# Patient Record
Sex: FEMALE | Race: WHITE | NOT HISPANIC OR LATINO | Employment: OTHER | URBAN - METROPOLITAN AREA
[De-identification: names, ages, dates, MRNs, and addresses within clinical notes are randomized per-mention and may not be internally consistent; named-entity substitution may affect disease eponyms.]

---

## 2017-01-11 ENCOUNTER — APPOINTMENT (OUTPATIENT)
Dept: LAB | Age: 82
End: 2017-01-11
Payer: MEDICARE

## 2017-01-11 ENCOUNTER — TRANSCRIBE ORDERS (OUTPATIENT)
Dept: ADMINISTRATIVE | Age: 82
End: 2017-01-11

## 2017-01-11 DIAGNOSIS — R73.9 BLOOD GLUCOSE ELEVATED: Primary | ICD-10-CM

## 2017-01-11 DIAGNOSIS — R73.9 BLOOD GLUCOSE ELEVATED: ICD-10-CM

## 2017-01-11 LAB
EST. AVERAGE GLUCOSE BLD GHB EST-MCNC: 108 MG/DL
GLUCOSE P FAST SERPL-MCNC: 106 MG/DL (ref 65–99)
HBA1C MFR BLD: 5.4 % (ref 4.2–6.3)

## 2017-01-11 PROCEDURE — 36415 COLL VENOUS BLD VENIPUNCTURE: CPT

## 2017-01-11 PROCEDURE — 83036 HEMOGLOBIN GLYCOSYLATED A1C: CPT

## 2017-01-11 PROCEDURE — 82947 ASSAY GLUCOSE BLOOD QUANT: CPT

## 2017-01-18 ENCOUNTER — ALLSCRIPTS OFFICE VISIT (OUTPATIENT)
Dept: OTHER | Facility: OTHER | Age: 82
End: 2017-01-18

## 2017-01-18 DIAGNOSIS — R73.9 HYPERGLYCEMIA: ICD-10-CM

## 2017-04-05 ENCOUNTER — ALLSCRIPTS OFFICE VISIT (OUTPATIENT)
Dept: OTHER | Facility: OTHER | Age: 82
End: 2017-04-05

## 2017-04-05 ENCOUNTER — HOSPITAL ENCOUNTER (OUTPATIENT)
Dept: RADIOLOGY | Age: 82
Discharge: HOME | End: 2017-04-05
Payer: MEDICARE

## 2017-04-05 ENCOUNTER — TRANSCRIBE ORDERS (OUTPATIENT)
Dept: ADMINISTRATIVE | Age: 82
End: 2017-04-05

## 2017-04-05 DIAGNOSIS — M79.671 PAIN OF RIGHT FOOT: ICD-10-CM

## 2017-04-05 PROCEDURE — 73630 X-RAY EXAM OF FOOT: CPT

## 2017-05-01 ENCOUNTER — ALLSCRIPTS OFFICE VISIT (OUTPATIENT)
Dept: OTHER | Facility: OTHER | Age: 82
End: 2017-05-01

## 2017-05-17 ENCOUNTER — TRANSCRIBE ORDERS (OUTPATIENT)
Dept: ADMINISTRATIVE | Age: 82
End: 2017-05-17

## 2017-05-17 ENCOUNTER — APPOINTMENT (OUTPATIENT)
Dept: LAB | Age: 82
End: 2017-05-17
Payer: MEDICARE

## 2017-05-17 DIAGNOSIS — R73.9 HYPERGLYCEMIA: ICD-10-CM

## 2017-05-17 LAB
EST. AVERAGE GLUCOSE BLD GHB EST-MCNC: 111 MG/DL
GLUCOSE P FAST SERPL-MCNC: 104 MG/DL (ref 65–99)
HBA1C MFR BLD: 5.5 % (ref 4.2–6.3)

## 2017-05-17 PROCEDURE — 36415 COLL VENOUS BLD VENIPUNCTURE: CPT

## 2017-05-17 PROCEDURE — 83036 HEMOGLOBIN GLYCOSYLATED A1C: CPT

## 2017-05-17 PROCEDURE — 82947 ASSAY GLUCOSE BLOOD QUANT: CPT

## 2017-05-23 ENCOUNTER — ALLSCRIPTS OFFICE VISIT (OUTPATIENT)
Dept: OTHER | Facility: OTHER | Age: 82
End: 2017-05-23

## 2017-05-23 DIAGNOSIS — I10 ESSENTIAL (PRIMARY) HYPERTENSION: ICD-10-CM

## 2017-05-23 DIAGNOSIS — R73.9 HYPERGLYCEMIA: ICD-10-CM

## 2017-09-22 ENCOUNTER — TRANSCRIBE ORDERS (OUTPATIENT)
Dept: ADMINISTRATIVE | Age: 82
End: 2017-09-22

## 2017-09-22 ENCOUNTER — APPOINTMENT (OUTPATIENT)
Dept: LAB | Age: 82
End: 2017-09-22
Payer: MEDICARE

## 2017-09-22 DIAGNOSIS — I10 ESSENTIAL (PRIMARY) HYPERTENSION: ICD-10-CM

## 2017-09-22 DIAGNOSIS — R73.9 HYPERGLYCEMIA: ICD-10-CM

## 2017-09-22 LAB
ALBUMIN SERPL BCP-MCNC: 3.5 G/DL (ref 3.5–5)
ALP SERPL-CCNC: 61 U/L (ref 46–116)
ALT SERPL W P-5'-P-CCNC: 9 U/L (ref 12–78)
ANION GAP SERPL CALCULATED.3IONS-SCNC: 7 MMOL/L (ref 4–13)
AST SERPL W P-5'-P-CCNC: 13 U/L (ref 5–45)
BACTERIA UR QL AUTO: ABNORMAL /HPF
BASOPHILS # BLD AUTO: 0.01 THOUSANDS/ΜL (ref 0–0.1)
BASOPHILS NFR BLD AUTO: 0 % (ref 0–1)
BILIRUB SERPL-MCNC: 0.88 MG/DL (ref 0.2–1)
BILIRUB UR QL STRIP: NEGATIVE
BUN SERPL-MCNC: 15 MG/DL (ref 5–25)
CALCIUM SERPL-MCNC: 8.6 MG/DL (ref 8.3–10.1)
CHLORIDE SERPL-SCNC: 100 MMOL/L (ref 100–108)
CHOLEST SERPL-MCNC: 149 MG/DL (ref 50–200)
CLARITY UR: CLEAR
CO2 SERPL-SCNC: 29 MMOL/L (ref 21–32)
COLOR UR: YELLOW
CREAT SERPL-MCNC: 0.8 MG/DL (ref 0.6–1.3)
CREAT UR-MCNC: 33.3 MG/DL
EOSINOPHIL # BLD AUTO: 0.03 THOUSAND/ΜL (ref 0–0.61)
EOSINOPHIL NFR BLD AUTO: 1 % (ref 0–6)
ERYTHROCYTE [DISTWIDTH] IN BLOOD BY AUTOMATED COUNT: 12.3 % (ref 11.6–15.1)
EST. AVERAGE GLUCOSE BLD GHB EST-MCNC: 108 MG/DL
GFR SERPL CREATININE-BSD FRML MDRD: 69 ML/MIN/1.73SQ M
GLUCOSE P FAST SERPL-MCNC: 97 MG/DL (ref 65–99)
GLUCOSE UR STRIP-MCNC: NEGATIVE MG/DL
HBA1C MFR BLD: 5.4 % (ref 4.2–6.3)
HCT VFR BLD AUTO: 35.7 % (ref 34.8–46.1)
HDLC SERPL-MCNC: 65 MG/DL (ref 40–60)
HGB BLD-MCNC: 12.4 G/DL (ref 11.5–15.4)
HGB UR QL STRIP.AUTO: ABNORMAL
KETONES UR STRIP-MCNC: NEGATIVE MG/DL
LDLC SERPL CALC-MCNC: 69 MG/DL (ref 0–100)
LEUKOCYTE ESTERASE UR QL STRIP: NEGATIVE
LYMPHOCYTES # BLD AUTO: 1.35 THOUSANDS/ΜL (ref 0.6–4.47)
LYMPHOCYTES NFR BLD AUTO: 23 % (ref 14–44)
MCH RBC QN AUTO: 31.9 PG (ref 26.8–34.3)
MCHC RBC AUTO-ENTMCNC: 34.7 G/DL (ref 31.4–37.4)
MCV RBC AUTO: 92 FL (ref 82–98)
MICROALBUMIN UR-MCNC: <5 MG/L (ref 0–20)
MICROALBUMIN/CREAT 24H UR: <15 MG/G CREATININE (ref 0–30)
MONOCYTES # BLD AUTO: 0.43 THOUSAND/ΜL (ref 0.17–1.22)
MONOCYTES NFR BLD AUTO: 7 % (ref 4–12)
NEUTROPHILS # BLD AUTO: 4.04 THOUSANDS/ΜL (ref 1.85–7.62)
NEUTS SEG NFR BLD AUTO: 69 % (ref 43–75)
NITRITE UR QL STRIP: NEGATIVE
NON-SQ EPI CELLS URNS QL MICRO: ABNORMAL /HPF
NRBC BLD AUTO-RTO: 0 /100 WBCS
PH UR STRIP.AUTO: 7 [PH] (ref 4.5–8)
PLATELET # BLD AUTO: 156 THOUSANDS/UL (ref 149–390)
PMV BLD AUTO: 9.8 FL (ref 8.9–12.7)
POTASSIUM SERPL-SCNC: 3.7 MMOL/L (ref 3.5–5.3)
PROT SERPL-MCNC: 6.9 G/DL (ref 6.4–8.2)
PROT UR STRIP-MCNC: NEGATIVE MG/DL
RBC # BLD AUTO: 3.89 MILLION/UL (ref 3.81–5.12)
RBC #/AREA URNS AUTO: ABNORMAL /HPF
SODIUM SERPL-SCNC: 136 MMOL/L (ref 136–145)
SP GR UR STRIP.AUTO: 1.01 (ref 1–1.03)
TRIGL SERPL-MCNC: 76 MG/DL
TSH SERPL DL<=0.05 MIU/L-ACNC: 0.98 UIU/ML (ref 0.36–3.74)
UROBILINOGEN UR QL STRIP.AUTO: 1 E.U./DL
WBC # BLD AUTO: 5.88 THOUSAND/UL (ref 4.31–10.16)
WBC #/AREA URNS AUTO: ABNORMAL /HPF

## 2017-09-22 PROCEDURE — 85025 COMPLETE CBC W/AUTO DIFF WBC: CPT

## 2017-09-22 PROCEDURE — 83036 HEMOGLOBIN GLYCOSYLATED A1C: CPT

## 2017-09-22 PROCEDURE — 82570 ASSAY OF URINE CREATININE: CPT

## 2017-09-22 PROCEDURE — 36415 COLL VENOUS BLD VENIPUNCTURE: CPT

## 2017-09-22 PROCEDURE — 84443 ASSAY THYROID STIM HORMONE: CPT

## 2017-09-22 PROCEDURE — 82043 UR ALBUMIN QUANTITATIVE: CPT

## 2017-09-22 PROCEDURE — 80061 LIPID PANEL: CPT

## 2017-09-22 PROCEDURE — 81001 URINALYSIS AUTO W/SCOPE: CPT

## 2017-09-22 PROCEDURE — 80053 COMPREHEN METABOLIC PANEL: CPT

## 2017-09-28 ENCOUNTER — ALLSCRIPTS OFFICE VISIT (OUTPATIENT)
Dept: OTHER | Facility: OTHER | Age: 82
End: 2017-09-28

## 2017-09-28 DIAGNOSIS — M81.0 AGE-RELATED OSTEOPOROSIS WITHOUT CURRENT PATHOLOGICAL FRACTURE: ICD-10-CM

## 2017-09-29 ENCOUNTER — GENERIC CONVERSION - ENCOUNTER (OUTPATIENT)
Dept: OTHER | Facility: OTHER | Age: 82
End: 2017-09-29

## 2018-01-12 VITALS
DIASTOLIC BLOOD PRESSURE: 64 MMHG | BODY MASS INDEX: 30.92 KG/M2 | WEIGHT: 192.38 LBS | SYSTOLIC BLOOD PRESSURE: 122 MMHG | TEMPERATURE: 98 F | OXYGEN SATURATION: 98 % | HEIGHT: 66 IN | HEART RATE: 92 BPM

## 2018-01-13 VITALS
BODY MASS INDEX: 30.86 KG/M2 | TEMPERATURE: 99.5 F | HEART RATE: 94 BPM | OXYGEN SATURATION: 98 % | HEIGHT: 66 IN | WEIGHT: 192 LBS | SYSTOLIC BLOOD PRESSURE: 128 MMHG | DIASTOLIC BLOOD PRESSURE: 70 MMHG

## 2018-01-14 VITALS
TEMPERATURE: 98.3 F | SYSTOLIC BLOOD PRESSURE: 132 MMHG | RESPIRATION RATE: 16 BRPM | HEIGHT: 66 IN | WEIGHT: 193.5 LBS | HEART RATE: 90 BPM | BODY MASS INDEX: 31.1 KG/M2 | DIASTOLIC BLOOD PRESSURE: 75 MMHG | OXYGEN SATURATION: 98 %

## 2018-01-14 VITALS
HEART RATE: 86 BPM | SYSTOLIC BLOOD PRESSURE: 138 MMHG | BODY MASS INDEX: 30.7 KG/M2 | TEMPERATURE: 98.3 F | WEIGHT: 191 LBS | HEIGHT: 66 IN | OXYGEN SATURATION: 97 % | DIASTOLIC BLOOD PRESSURE: 80 MMHG

## 2018-01-16 NOTE — PROGRESS NOTES
Assessment    1  Osteoporosis (733 00) (M81 0)   2  Anxiety (300 00) (F41 9)   3  Hypertension (401 9) (I10)   4  Hyperlipidemia (272 4) (E78 5)   5  Hyperglycemia (790 29) (R73 9)   6  Encounter for preventive health examination (V70 0) (Z00 00)   7  History of Diabetes mellitus with microalbuminuria or microproteinuria (250 00,791 0)   (E11 29,R80 9)    Plan  Anxiety    · Citalopram Hydrobromide 40 MG Oral Tablet; Take 1 tablet daily   · RaNITidine HCl - 150 MG Oral Tablet; TAKE 1 TABLET EVERY 12 HOURS as  needed  Hyperglycemia    · *VB - Foot Exam; Status:Active; Requested DYE:[id number];    · Follow-up visit in 4 Months Evaluation and Treatment  Follow-up  Status: Hold For -  Scheduling  Requested for: [id number]  Hyperlipidemia    · Atorvastatin Calcium 10 MG Oral Tablet; TAKE 1 TABLET BY MOUTH   ONCE  DAILY  Hypertension    · Losartan Potassium-HCTZ 100-25 MG Oral Tablet; Take 1 tablet daily  Hypokalemia    · Potassium Chloride Bijal ER 10 MEQ Oral Tablet Extended Release; Take 1  tablet once daily  Need for immunization against influenza    · Fluzone Quadrivalent Intramuscular Suspension  Osteoporosis    · * DXA BONE DENSITY SPINE HIP AND PELVIS; Status:Hold For - Scheduling;  Requested [facility]:[id number];     Discussion/Summary    #1  Osteoporosis  Patient had a bone scan approximately 3 years ago showing decrease in bone density to her left hip  At that time patient refused to be placed on medication and we did suggest that she have another bone scan for further evaluation and again consideration of treatments because modalities have changed  Patient understands and agrees  #2  Anxiety disorder  Patient states she's no longer taking the Xanax and emotionally she is doing well and has adjusted well to living with her daughter  #3  Hypertension  Patient's blood pressure shows excellent control with present treatment   Patient will continue present medication and will have this checked again when she returns to the office in 4 months  #4  Hyperlipidemia  We're happy to tell the patient that her cholesterol is showing excellent control with present treatment, diet  We will continue to monitor this and adjust medication as needed  #5  Hyperglycemia  Patient sugar is normal as well as her hemoglobin A1c with no evidence of diabetes  We will suppress and resolve this issue and we will continue to monitor her blood sugars and treat appropriately  Patient has no evidence of diabetes  #6  Health maintenance  We discussed with the patient having routine colonoscopies, heme test stool, mammograms, routine gynecologic care and she refuses  Patient will continue to call us if there's any issues or problems and we will continue with regular evaluation sooner office  Impression: Subsequent Annual Wellness Visit, with preventive exam as well as age and risk appropriate counseling completed  Cardiovascular screening and counseling: the risks and benefits of screening were discussed and screening is current  Diabetes screening and counseling: the risks and benefits of screening were discussed and screening is current  Colorectal cancer screening and counseling: the risks and benefits of screening were discussed and screening is current  Breast cancer screening and counseling: the risks and benefits of screening were discussed and the patient declines screening  Cervical cancer screening and counseling: the risks and benefits of screening were discussed and the patient declines screening  Osteoporosis screening and counseling: the risks and benefits of screening were discussed and due for DXA axial skeleton  Abdominal aortic aneurysm screening and counseling: screening not indicated  Glaucoma screening and counseling: the risks and benefits of screening were discussed and screening is current  HIV screening and counseling: screening not indicated     Hepatitis C Screening: the patient was counseled on Hepatitis C screening  The patient declines Hepatitis C screening  Immunizations: the risks and benefits of influenza vaccination were discussed with the patient, influenza vaccine is up to date this year, the risks and benefits of pneumococcal vaccination were discussed with the patient, the lifetime pneumococcal vaccine has been completed, hepatitis B vaccination series is not indicated at this time due to the patient's low risk of violet the disease, the risks and benefits of the Zostavax vaccine were discussed with the patient, the patient declines the Zostavax vaccine, the risks and benefits of the Td vaccine were discussed with the patient and Td vaccination up to date  Advance Directive Planning: complete and up to date  Patient Discussion: follow-up visit needed in 4 months  Chief Complaint  Patient is here today for an annual wellness exam w/ labs      Advance Directives  Advance Directive [place]:   YES - Patient has an advance health care directive  The patient has a living will located  in patient's home and with patient's   Durable Power of  For Healthcare:    Name: Xuan Bennett   Relationship:    Capacity/Competence: This patient has full decision making capacity for discussion of advance care planning and This patient has full decision making competency for discussion of advance care planning  Summary of Advance Directive Conversation  Comfort Care only, no heroic measures, she has discussed her wishes at length with her daughter  The provider spent 5 minutes minutes discussing Advance Directives  History of Present Illness  HPI: Patient is an 80-year-old female with a history of multiple medical problems including hypertension, hyperlipidemia, gastroesophageal reflux disease, granuloma annulare, anxiety disorder, diffuse DJD   Patient is here today for routine follow-up and Medicare wellness exam  Patient states she's been feeling well and her only concerns at this point in time are the fact that she wishes to lose weight but finds is very difficult  She did have complete labs performed prior to that visit today and were happy to tell the patient that they were all normal    Welcome to [employer] and Wellness Visits: The patient is being seen for the subsequent annual wellness visit  Advance Directives: Advance directives: living will, durable power of  for health care directives and advance directives  end of life decisions were reviewed with the patient and I agree with the patient's decisions  Co-Managers and Medical Equipment/Suppliers: See Patient Care Team   Reviewed Updated [facility]:   Last Medicare Wellness Visit Information was reviewed, patient interviewed and updates made to the record today  Preventive Quality Program 65 and Older: Falls Risk: The patient fell 0 times in the past 12 months  The patient is currently asymptomatic Symptoms Include: The patient is currently experiencing urinary symptoms  Urinary Incontinence Symptoms includes: urinary urgency and nocturia    Date of last glaucoma screen was Patient is seen an ophthalmologist on a regular basis and has glaucoma screening  Review of Systems    Constitutional: negative  Head and Face: negative  Eyes: negative  ENT: negative  Cardiovascular: lower extremity edema and Patient has chronic +1 edema bilateral lower extremities which is unchanged, but no chest pain, no palpitations, the heart is not racing and no lightheadedness  Respiratory: negative  Gastrointestinal: negative  Genitourinary: negative  Musculoskeletal: diffuse joint pain and joint stiffness, but no generalized muscle aches, no back pain, no joint swelling, no back muscle spasm, no pain in other joints and no limping  Integumentary and Breasts: negative  Neurological: negative     Psychiatric: anxiety, depression and Patient has a history of chronic anxiety and depression and states that she's been having no problems recently and has stopped taking her Xanax completely, but no insomnia, no irritability and not suicidal    Endocrine: negative  Hematologic and Lymphatic: negative  Active Problems    1  Acute bronchitis (466 0) (J20 9)   2  Acute foot pain, right (729 5) (M79 671)   3  Acute pain of left knee (719 46) (M25 562)   4  Acute sinusitis (461 9) (J01 90)   5  Anxiety (300 00) (F41 9)   6  Arthritis (716 90) (M19 90)   7  Atopic dermatitis (691 8) (L20 9)   8  Atopic dermatitis of eyelid (373 31) (H01 139)   9  Chest pain (786 50) (R07 9)   10  Common cold (460) (J00)   11  Contact dermatitis due to poison ivy (692 6) (L23 7)   12  Contact dermatitis due to poison ivy (692 6) (L23 7)   13  Esophageal reflux (530 81) (K21 9)   14  Fatigue (780 79) (R53 83)   15  Granuloma annulare (695 89) (L92 0)   16  Herpes zoster (053 9) (B02 9)   17  Hyperglycemia (790 29) (R73 9)   18  Hyperlipidemia (272 4) (E78 5)   19  Hypertension (401 9) (I10)   20  Hypokalemia (276 8) (E87 6)   21  Low Back Pain Unchanged When Going From Sitting To Standing   22  Lower abdominal pain (789 09) (R10 30)   23  Lymphadenopathy (785 6) (R59 1)   24  Multiple falls (V15 88) (R29 6)   25  Need for immunization against influenza (V04 81) (Z23)   26  Osteoporosis (733 00) (M81 0)   27  Pharyngitis (462) (J02 9)   28  Rash, skin (782 1) (R21)   29  Salivary disorder (527 9)   30  Salivary duct obstruction (527 8)   31  Sciatica neuralgia (724 3) (M54 30)   32  Thyroid cancer (193) (C73)   33  Thyroid nodule (241 0) (E04 1)   34   Tinea of the body (110 5) (B35 4)    Past Medical History    · History of Anxiety (300 00) (F41 9)   · History of Diabetes Mellitus (250 00)   · History of Diabetes mellitus with microalbuminuria or microproteinuria (250 00,791 0)  (E11 29,R80 9)   · History of gastroesophageal reflux (GERD) (V12 79) (Z87 19)   · History of hyperlipidemia (V12 29) (Z86 39)   · History of hypertension (V12 59) (Z86 79)   · History of Osteoarthritis (V13 4)    The active problems and past medical history were reviewed and updated today  Surgical History    · History of Cataract Surgery    The surgical history was reviewed and updated today  Family History  Mother    · Family history of Congestive Heart Failure   · Family history of Heart Disease (V17 49)  Father    · Family history of Hypertension (V17 49)  Brother    · Family history of Hypertension (V17 49)    The family history was reviewed and updated today  Social History    · Denied: History of Alcohol Use (History)   · Daily Coffee Consumption (4  Cups/Day)   · Denied: History of Drug Use   · Denied: History of Tobacco Use  The social history was reviewed and updated today  The social history was reviewed and is unchanged  Current Meds   1  Atorvastatin Calcium 10 MG Oral Tablet; TAKE 1 TABLET BY MOUTH   ONCE DAILY; Therapy: [id number] to (Evaluate:22Jun2017)  Requested for: 75AXL3676; Last   Rx:[id number] Ordered   2  Baby Aspirin CHEW; USE AS DIRECTED Recorded   3  Calcium 600+D 600-200 MG-UNIT Oral Tablet; TAKE 1 TABLET DAILY; Therapy: 31WDU5667 to Recorded   4  Citalopram Hydrobromide 40 MG Oral Tablet; Take 1 tablet daily; Therapy: 11Jan2011-(Evaluate:22Jun2017)  Requested for: 59PEI0163 Ordered   5  Losartan Potassium-HCTZ 100-25 MG Oral Tablet; Take 1 tablet daily; Therapy: 82MOL4036 to (Evaluate:22Jun2017)  Requested for: 42HDB4255; Last   QL:[id number] Ordered   6  Potassium Chloride Bijal ER 10 MEQ Oral Tablet Extended Release; Take 1 tablet once   daily; Therapy: 46DTZ9615 to (Evaluate:12Mar2018)  Requested for: 11Tou8710; Last   Rx:[id number] Ordered   7  RaNITidine HCl - 150 MG Oral Tablet; TAKE 1 TABLET EVERY 12 HOURS as needed; Therapy: 41RGB0455 to ([facility])  Requested for: 99KYF4589; Last   Rx:[id number] Ordered    The medication list was reviewed and updated today  Allergies    1  Ampicillin CAPS   2  Bactrim TABS   3   Sulfa Drugs    Immunizations   1 2 3 4    DTP/DTaP  14-May-2014       Influenza  Oct 2010 23-Sep-2013 11-Sep-2014 08-Sep-2016    PCV  03-Dec-2014        Vitals  Signs    Temperature: 97 9 F  Heart Rate: 85  Systolic: 873  Diastolic: 82  Height: 5 ft 6 in  Weight: 191 lb 4 oz  BMI Calculated: 30 87  BSA Calculated: 1 96  O2 Saturation: 98    Physical Exam    Constitutional Pleasant cheerful obese 51-year-old female who is awake alert in no acute distress and oriented x3  Head and Face   Head and face: Normal     Palpation of the face and sinuses: No sinus tenderness  Eyes   Conjunctiva and lids: No swelling, erythema or discharge  Ophthalmoscopic examination: Abnormal   Pupils are small unable to see fundi patient is scheduled for a dilated exam in the future  Ears, Nose, Mouth, and Throat   External inspection of ears and nose: Normal     Otoscopic examination: Tympanic membranes translucent with normal light reflex  Canals patent without erythema  Hearing: Normal     Nasal mucosa, septum, and turbinates: Normal without edema or erythema  Lips, teeth, and gums: Normal, good dentition  Oropharynx: Normal with no erythema, edema, exudate or lesions  Neck   Neck: Supple, symmetric, trachea midline, no masses  Thyroid: Normal, no thyromegaly  Pulmonary   Respiratory effort: No increased work of breathing or signs of respiratory distress  Percussion of chest: Normal     Palpation of chest: Normal     Auscultation of lungs: Clear to auscultation  Cardiovascular   Palpation of heart: Normal PMI, no thrills  Auscultation of heart: Normal rate and rhythm, normal S1 and S2, no murmurs  Carotid pulses: 2+ bilaterally  Abdominal aorta: Normal     Femoral pulses: 2+ bilaterally  Pedal pulses: 2+ bilaterally  Peripheral vascular exam: Normal     Examination of extremities for edema and/or varicosities: Abnormal   +1 edema bilateral lower extremities which is unchanged     Chest Refuses examination and mammogram    Abdomen   Abdomen: Abnormal   Obese soft nontender with positive bowel sounds x4 quadrants with no organomegaly or masses  Liver and spleen: No hepatomegaly or splenomegaly  Examination for hernias: No hernia appreciated  Anus, perineum, and rectum: Abnormal   Refuses exam  Has not completed Hemoccult, and refuses to do so  Genitourinary Patient has stopped seeing her gynecologist in refuses a pelvic examination  Lymphatic   Palpation of lymph nodes in neck: No lymphadenopathy  Palpation of lymph nodes in axillae: No lymphadenopathy  Palpation of lymph nodes in groin: No lymphadenopathy  Palpation of lymph nodes in other areas: No lymphadenopathy  Musculoskeletal   Gait and station: Normal     Digits and nails: Abnormal   Diffuse mild to moderate degenerative changes to both hands and digits  Joints, bones, and muscles: Abnormal   Diffuse arthritis but no acute changes changes  Range of motion: Normal     Stability: Normal     Muscle strength/tone: Normal     Skin   Skin and subcutaneous tissue: Normal without rashes or lesions  Examination of the skin for lesions: Abnormal   Some slight stasis changes bilateral lower extremities around the ankles  Palpation of skin and subcutaneous tissue: Normal turgor  Neurologic   Cranial nerves: Cranial nerves II-XII intact  Cortical function: Normal mental status  Reflexes: 2+ and symmetric  Sensation: No sensory loss  Coordination: Normal finger to nose and heel to shin  Psychiatric   Judgment and insight: Normal     Orientation to person, place, and time: Normal     Recent and remote memory: Intact      Mood and affect: Normal        Results/Data  Falls Risk Assessment (Dx Z13 89 Screen for Neurologic Disorder) [id number] 10:26AM User, [employer]     Test Name Result Flag Reference   Falls Risk      2 or more falls past year     PHQ-2 Adult Depression Screening 28Sep2017 10:25AM User, [employer]     Test Name Result Flag Reference   PHQ-2 Adult Depression Score 0     Over the last two weeks, how often have you been bothered by any of the following problems?   Little interest or pleasure in doing things: Not at all - 0  Feeling down, depressed, or hopeless: Not at all - 0   PHQ-2 Adult Depression Screening Negative         Future Appointments    Date/Time Provider Specialty Site   02/01/2018 11:00 AM Kay Tolbert DO Internal Medicine [facility] INTERNAL MED     Signatures   Electronically signed by : Jessica Raygoza DO; Sep 28 2017 12:24PM EST                       (Author)

## 2018-01-22 VITALS
SYSTOLIC BLOOD PRESSURE: 128 MMHG | HEIGHT: 66 IN | OXYGEN SATURATION: 98 % | WEIGHT: 191.25 LBS | HEART RATE: 85 BPM | DIASTOLIC BLOOD PRESSURE: 82 MMHG | BODY MASS INDEX: 30.74 KG/M2 | TEMPERATURE: 97.9 F

## 2018-01-23 NOTE — PROGRESS NOTES
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)    Plan  Anxiety    · Diazepam 5 MG Oral Tablet (Valium); Take 1 tablet 3 times daily as needed  Granuloma annulare    · Follow-up visit in 4 Months Evaluation and Treatment  Follow-up  Status: Hold For -  Scheduling  Requested for: 08Sep2016  Hyperglycemia    · (1) GLUCOSE,  FASTING; Status:Active; Requested [facility]:[id number];    · (1) HEMOGLOBIN A1C; Status:Active; Requested for:08Sep2016;   Hypokalemia    · Potassium Chloride Bijal ER 10 MEQ Oral Tablet Extended Release; Take 1 tablet  daily   · (1) BASIC METABOLIC PROFILE; Status:Active; Requested [facility]:[id number];   Need for immunization against influenza    · Fluzone High-Dose 0 5 ML Intramuscular Suspension Prefilled Syringe  Tinea of the body    · Nystatin 019795 UNIT/GM External Cream    Discussion/Summary    #1  Hyperglycemia  Patient had a recent blood sugar and hemoglobin A1c which have come back normal  We did discuss again with the patient today the importance of diet, avoiding concentrated sweets and simple carbohydrates  We will continue to monitor sugar  #2  Hypokalemia  Recent testing showed the patient with a low potassium level of 3 3  Because of this patient was placed on potassium tablets 10 mEq by mouth daily  Patient was told to take daily and she was given a slip to check on a potassium level in approximately 2 weeks  She was told to call if any GI upset or problems with the medication  #3  Hypertension  Patient's blood pressure is well controlled with present treatment  #4  Granuloma annulare  Patient was just to see the dermatologist yesterday and was given a diagnosis  Patient has been started on a specific cream to help this disorder but was told this may not be totally effective   Patient was also instructed to use an occlusive dressing with the cream  Hopefully we will have correspondence from the dermatologist in the near future  Impression: Subsequent Annual Wellness Visit, with preventive exam as well as age and risk appropriate counseling completed  Cardiovascular screening and counseling: the risks and benefits of screening were discussed and screening is current  Diabetes screening and counseling: the risks and benefits of screening were discussed and screening is current  Colorectal cancer screening and counseling: the risks and benefits of screening were discussed and screening is current  Breast cancer screening and counseling: the risks and benefits of screening were discussed and screening is current  Cervical cancer screening and counseling: the risks and benefits of screening were discussed and the patient declines screening  Osteoporosis screening and counseling: the risks and benefits of screening were discussed and screening is current  Abdominal aortic aneurysm screening and counseling: screening not indicated  Glaucoma screening and counseling: the risks and benefits of screening were discussed and screening is current  HIV screening and counseling: screening not indicated  Immunizations: the risks and benefits of influenza vaccination were discussed with the patient, influenza vaccine is due today, the risks and benefits of pneumococcal vaccination were discussed with the patient, the lifetime pneumococcal vaccine has been completed, hepatitis B vaccination series is not indicated at this time due to the patient's low risk of violet the disease, the risks and benefits of the Zostavax vaccine were discussed with the patient, the patient declines the Zostavax vaccine, the risks and benefits of the Td vaccine were discussed with the patient and Td vaccination up to date  Patient Discussion: plan discussed with the patient, follow-up visit needed in 4 months        Chief Complaint  Patient is here today for an annual physical w/ labs Advance Directives  Advance Directive [facility]:   NO - Patient does not have an advance health care directive  Capacity/Competence: This patient has full decision making capacity for discussion of advance care planning and This patient has full decision making competency for discussion of advance care planning  Summary of Advance Directive Conversation  Patient states she does not have a living will but one of her granddaughters is a  and is making arrangements for patient to complete this  Patient states she does not want any heroic measures, comfort care only  The provider spent 5 minutes minutes discussing Advance Directives  History of Present Illness  HPI: Patient is a 80-year-old female with a history of hypertension, hyperglycemia, anxiety disorder, DJD, gastroesophageal reflux disease, history of thyroid nodules under the care of of a ears nose and throat physician  Patient is here today for a routine Medicare wellness exam  Patient states she's been doing relatively well  She did see a dermatologist recently for rash to her lower extremities and 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   Welcome to Medicare and Wellness Visits: The patient is being seen for the subsequent annual wellness visit  Medicare Screening and Risk Factors   Hospitalizations: she has been previously hospitalizied  Once per lifetime medicare screening tests: ECG  Medicare Screening Tests Risk Questions   Osteoporosis risk assessment: , female gender and over 48years of age  HIV risk assessment: none indicated  Drug and Alcohol Use: The patient has never smoked cigarettes  The patient reports rare alcohol use  Alcohol concern:   The patient has no concerns about alcohol abuse  She has never used illicit drugs  Diet and Physical Activity: Current diet includes well balanced meals and limited junk food  She exercises infrequently  Exercise: walking     Mood Disorder and Cognitive Impairment Screening: Geriatric Depression Scale   Depression screening score was Total of 7 on the geriatric depression scale   mild to moderate symptoms  She denies feeling down, depressed, or hopeless over the past two weeks  She denies feeling little interest or pleasure in doing things over the past two weeks  Cognitive impairment screening: denies difficulty learning/retaining new information, denies difficulty handling complex tasks, denies difficulty with reasoning, denies difficulty with spatial ability and orientation, denies difficulty with language, denies difficulty with behavior and Total of 30 on the Mini-Mental Status exam    Functional Ability/Level of Safety: Hearing is normal bilaterally  The patient is currently able to do activities of daily living without limitations, able to do instrumental activities of daily living without limitations, able to participate in social activities without limitations and able to drive without limitations  Activities of daily living details: does not need help using the phone, no transportation help needed, does not need help shopping, no meal preparation help needed, does not need help doing housework, does not need help doing laundry, does not need help managing medications and does not need help managing money  Fall risk factors:  sedative use and antihypertensive use, but The patient fell times in the past 12 months , no polypharmacy, no alcohol use, no mobility impairment, no antidepressant use, no deconditioning, no postural hypotension, no visual impairment, no urinary incontinence, no cognitive impairment, up and go test was normal and no previous fall  Home safety risk factors:  no unfamiliar surroundings, no loose rugs, no poor household lighting, no uneven floors, no household clutter and grab bars in the bathroom     Advance Directives: Advance directives: no living will, no durable power of  for health care directives and no advance directives  end of life decisions were reviewed with the patient and I agree with the patient's decisions  Co-Managers and Medical Equipment/Suppliers: See Patient Care Team   Reviewed Updated [address] [place] [address] [address]:   Last Medicare Wellness Visit Information was reviewed, patient interviewed and updates made to the record today  Preventive Quality Program 65 and Older: Falls Risk: The patient fell 0 times in the past 12 months  The patient is currently asymptomatic Symptoms Include:  Associated symptoms:  No associated symptoms are reported  The patient currently has no urinary incontinence symptoms  Date of last glaucoma screen was Patient did have one trachoma screening recently along with evaluation for her bilateral cataracts and bilateral cataract surgery      Review of Systems    Constitutional: negative  Head and Face: negative  Eyes: negative  ENT: negative  Cardiovascular: lower extremity edema and Patient has chronic +1 edema bilateral lower extremities which is unchanged, but no chest pain, no palpitations, the heart is not racing and no lightheadedness  Respiratory: negative  Gastrointestinal: negative  Genitourinary: negative  Musculoskeletal: diffuse joint pain and joint stiffness, but no generalized muscle aches, no back pain, no joint swelling, no back muscle spasm, no pain in other joints and no limping  Integumentary and Breasts: a rash and skin lesion, but as noted in HPI, no skin wound, no itching, no painful skin area with a rash or sore, no mouth sores, no erythema, no edema, no skin scaling, not blister, no skin ulcer, no patch, no nodule, no plaque, no papule, no pustule, no breast pain and no breast lump  Neurological: negative  Psychiatric: anxiety and depression, but as noted in HPI, no insomnia, no irritability and not suicidal    Endocrine: negative  Hematologic and Lymphatic: negative                                       Score 7   Normal 0 - 9, Mild Depression 10 - 19, Severe Depression 20 - 30      Active Problems    1  Acute bronchitis (466 0) (J20 9)   2  Acute sinusitis (461 9) (J01 90)   3  Anxiety (300 00) (F41 9)   4  Arthritis (716 90) (M19 90)   5  Atopic dermatitis (691 8) (L20 9)   6  Atopic dermatitis of eyelid (373 31) (H01 139)   7  Chest pain (786 50) (R07 9)   8  Common cold (460) (J00)   9  Contact dermatitis due to poison ivy (692 6) (L23 7)   10  Contact dermatitis due to poison ivy (692 6) (L23 7)   11  Diabetes Mellitus (250 00)   12  Esophageal reflux (530 81) (K21 9)   13  Fatigue (780 79) (R53 83)   14  Herpes zoster (053 9) (B02 9)   15  Hyperglycemia (790 29) (R73 9)   16  Hyperlipidemia (272 4) (E78 5)   17  Hypertension (401 9) (I10)   18  Low Back Pain Unchanged When Going From Sitting To Standing   19  Lower abdominal pain (789 09) (R10 30)   20  Lymphadenopathy (785 6) (R59 1)   21  Need for immunization against influenza (V04 81) (Z23)   22  Osteoporosis (733 00) (M81 0)   23  Pharyngitis (462) (J02 9)   24  Rash, skin (782 1) (R21)   25  Salivary disorder (527 9)   26  Salivary duct obstruction (527 8)   27  Sciatica neuralgia (724 3) (M54 30)   28  Thyroid cancer (193) (C73)   29  Thyroid nodule (241 0) (E04 1)   30  Tinea of the body (110 5) (B35 4)    Past Medical History    · History of Anxiety (300 00) (F41 9)   · History of Diabetes Mellitus (250 00)   · History of gastroesophageal reflux (GERD) (V12 79) (Z87 19)   · History of hyperlipidemia (V12 29) (Z86 39)   · History of hypertension (V12 59) (Z86 79)   · History of Osteoarthritis (V13 4)    The active problems and past medical history were reviewed and updated today  Surgical History    · History of Cataract Surgery    The surgical history was reviewed and updated today         Family History  Mother    · Family history of Congestive Heart Failure   · Family history of Heart Disease (V17 49)  Father    · Family history of Hypertension (V17 49)  Brother    · Family history of Hypertension (V17 49)    The family history was reviewed and updated today  Social History    · Denied: History of Alcohol Use (History)   · Daily Coffee Consumption (4  Cups/Day)   · Denied: History of Drug Use   · Denied: History of Tobacco Use  The social history was reviewed and updated today  The social history was reviewed and is unchanged  Current Meds   1  Atorvastatin Calcium 10 MG Oral Tablet; TAKE 1 TABLET BY MOUTH   ONCE DAILY; Therapy: 44TBO2121 to (Evaluate:22Jun2017)  Requested for: 08UTN0639; Last   Rx:27Jun2016 Ordered   2  Baby Aspirin CHEW; USE AS DIRECTED Recorded   3  Citalopram Hydrobromide 40 MG Oral Tablet; Take 1 tablet daily; Therapy: 13JND4021-  Requested for: 88LHQ8974; Status: NEED INFORMATION -   Pharmacy Ordered   4  Diazepam 5 MG Oral Tablet; Take 1 tablet 3 times daily as needed; Therapy: 88JDS1129 to (Last Rx:[id number]) Ordered   5  Losartan Potassium-HCTZ 100-25 MG Oral Tablet; Take 1 tablet daily; Therapy: 85BSN3385 to (Evaluate:22Jun2017)  Requested for: 48AED5263; Last   UR:[id number] Ordered   6  Nystatin 403882 UNIT/GM External Cream; APPLY 2-3 TIMES DAILY TO AFFECTED   AREA(S); Therapy: 73NVY5257 to (Last Rx:97Pjf3170) Ordered   7  Ranitidine HCl - 150 MG Oral Tablet; TAKE 1 TABLET EVERY 12 HOURS as needed; Therapy: 68NKH3933 to (Last Rx:50Iis2226)  Requested for: 96Tdw8835 Ordered    The medication list was reviewed and updated today  Allergies    1  Ampicillin CAPS   2  Bactrim TABS   3  Sulfa Drugs    Immunizations   1 2 3    DTP/DTaP  14-May-2014      Influenza  Oct 2010 23-Sep-2013 11-Sep-2014    PCV  03-Dec-2014       Vitals  Signs    Systolic: 921  Diastolic: 80  Heart Rate: 77  Temperature: 99 F  O2 Saturation: 98  Height: 5 ft 6 in  Weight: 190 lb 8 0 oz  BMI Calculated: 30 75  BSA Calculated: 1 97    Physical Exam    Constitutional Pleasant cheerful obese 22-year-old female who is awake alert in no acute distress and oriented x3     Head and Face   Head and face: Normal     Palpation of the face and sinuses: No sinus tenderness  Eyes   Conjunctiva and lids: No swelling, erythema or discharge  Pupils and irises: Equal, round, reactive to light  Ophthalmoscopic examination: Abnormal   Pupils are small unable to see fundi patient is scheduled for a dilated exam in the future  Ears, Nose, Mouth, and Throat   External inspection of ears and nose: Normal     Otoscopic examination: Tympanic membranes translucent with normal light reflex  Canals patent without erythema  Hearing: Normal     Nasal mucosa, septum, and turbinates: Normal without edema or erythema  Lips, teeth, and gums: Normal, good dentition  Oropharynx: Normal with no erythema, edema, exudate or lesions  Neck   Neck: Supple, symmetric, trachea midline, no masses  Thyroid: Normal, no thyromegaly  Pulmonary   Respiratory effort: No increased work of breathing or signs of respiratory distress  Percussion of chest: Normal     Palpation of chest: Normal     Auscultation of lungs: Clear to auscultation  Cardiovascular   Palpation of heart: Normal PMI, no thrills  Auscultation of heart: Normal rate and rhythm, normal S1 and S2, no murmurs  Carotid pulses: 2+ bilaterally  Abdominal aorta: Normal     Femoral pulses: 2+ bilaterally  Pedal pulses: 2+ bilaterally  Peripheral vascular exam: Normal     Examination of extremities for edema and/or varicosities: Abnormal   +1 edema bilateral lower extremities which is unchanged  Chest Refuses examination and mammogram    Abdomen   Abdomen: Abnormal   Obese soft nontender with positive bowel sounds x4 quadrants with no organomegaly or masses  Liver and spleen: No hepatomegaly or splenomegaly  Examination for hernias: No hernia appreciated  Anus, perineum, and rectum: Abnormal   Refuses exam  Has not completed Hemoccult     Genitourinary Patient has stopped seeing her gynecologist in refuses a pelvic examination  Lymphatic   Palpation of lymph nodes in neck: No lymphadenopathy  Palpation of lymph nodes in axillae: No lymphadenopathy  Palpation of lymph nodes in groin: No lymphadenopathy  Palpation of lymph nodes in other areas: No lymphadenopathy  Musculoskeletal   Gait and station: Normal     Digits and nails: Abnormal   Diffuse mild to moderate degenerative changes to both hands and digits  Joints, bones, and muscles: Abnormal   Diffuse posterior critic changes  Range of motion: Normal     Stability: Normal     Muscle strength/tone: Normal     Skin   Skin and subcutaneous tissue: Normal without rashes or lesions  Examination of the skin for lesions: Abnormal   Some slight stasis changes bilateral lower extremities around the ankles  Examination for skin lesions: Abnormal   Granuloma angularity to the left lower extremity  Palpation of skin and subcutaneous tissue: Normal turgor  Neurologic   Cranial nerves: Cranial nerves II-XII intact  Cortical function: Normal mental status  Reflexes: 2+ and symmetric  Sensation: No sensory loss  Coordination: Normal finger to nose and heel to shin  Psychiatric   Judgment and insight: Normal     Orientation to person, place, and time: Normal     Recent and remote memory: Intact  Mood and affect: Normal        Results/Data  PHQ-2 Adult Depression Screening [id number] 10:25AM User, s     Test Name Result Flag Reference   PHQ-2 Adult Depression Score 0     Over the last two weeks, how often have you been bothered by any of the following problems?   Little interest or pleasure in doing things: Not at all - 0  Feeling down, depressed, or hopeless: Not at all - 0   PHQ-2 Adult Depression Screening Negative         Future Appointments    Date/Time Provider Specialty Site   01/17/2017 11:00 AM Steve Ji DO Internal Medicine [facility]     Signatures   Electronically signed by : Vita Cao DO; Sep  8 2016 12:57PM EST                       (Author)

## 2018-02-01 ENCOUNTER — OFFICE VISIT (OUTPATIENT)
Dept: INTERNAL MEDICINE CLINIC | Facility: CLINIC | Age: 83
End: 2018-02-01
Payer: MEDICARE

## 2018-02-01 VITALS
WEIGHT: 189 LBS | DIASTOLIC BLOOD PRESSURE: 80 MMHG | OXYGEN SATURATION: 96 % | HEIGHT: 66 IN | BODY MASS INDEX: 30.37 KG/M2 | TEMPERATURE: 98.3 F | HEART RATE: 71 BPM | SYSTOLIC BLOOD PRESSURE: 140 MMHG

## 2018-02-01 DIAGNOSIS — E78.01 FAMILIAL HYPERCHOLESTEROLEMIA: ICD-10-CM

## 2018-02-01 DIAGNOSIS — R73.9 HYPERGLYCEMIA: Primary | ICD-10-CM

## 2018-02-01 DIAGNOSIS — M81.0 AGE-RELATED OSTEOPOROSIS WITHOUT CURRENT PATHOLOGICAL FRACTURE: ICD-10-CM

## 2018-02-01 PROCEDURE — 99214 OFFICE O/P EST MOD 30 MIN: CPT | Performed by: INTERNAL MEDICINE

## 2018-02-01 RX ORDER — RANITIDINE 150 MG/1
1 TABLET ORAL EVERY 12 HOURS PRN
COMMUNITY
Start: 2011-01-11 | End: 2019-10-24 | Stop reason: SDUPTHER

## 2018-02-01 RX ORDER — LOSARTAN POTASSIUM AND HYDROCHLOROTHIAZIDE 25; 100 MG/1; MG/1
1 TABLET ORAL DAILY
COMMUNITY
Start: 2013-11-15 | End: 2019-10-24 | Stop reason: SDUPTHER

## 2018-02-01 RX ORDER — CITALOPRAM 40 MG/1
1 TABLET ORAL DAILY
COMMUNITY
Start: 2011-01-11 | End: 2019-10-24 | Stop reason: SDUPTHER

## 2018-02-01 RX ORDER — ATORVASTATIN CALCIUM 10 MG/1
TABLET, FILM COATED ORAL
COMMUNITY
Start: 2014-03-19 | End: 2018-10-17

## 2018-02-01 RX ORDER — ASPIRIN 81 MG/1
TABLET, CHEWABLE ORAL 2 TIMES DAILY
COMMUNITY

## 2018-02-01 RX ORDER — POTASSIUM CHLORIDE 750 MG/1
1 TABLET, EXTENDED RELEASE ORAL DAILY
COMMUNITY
Start: 2016-09-08 | End: 2019-07-18 | Stop reason: SDUPTHER

## 2018-02-01 NOTE — ASSESSMENT & PLAN NOTE
Patient has a history of a some slightly elevated blood sugars in the past and no evidence of diabetes  Again we discussed diet with the patient and the importance of reducing her consumption of concentrated sweets and simple carbohydrates    The patient has a slip to check on a blood sugar with her next

## 2018-02-01 NOTE — ASSESSMENT & PLAN NOTE
As previously we discussed with the patient the importance of watching her consumptions of fat and cholesterol  She states that living with her daughter this is been a lot he is here in that her daughter is watching her diet closely  We will continue to monitor her levels and make adjustments to treatment as necessary

## 2018-02-01 NOTE — ASSESSMENT & PLAN NOTE
Patient has a longstanding history of hypertension and in the past her blood pressure has been relatively well controlled  With the new criteria that is been set up for blood pressure control her blood pressure is actually elevated but we are unsure whether not they have changed the criteria for patient that her elderly  At this point we will continue present medication  We did stress to the patient today the importance of watching her consumption of salts in her diet  We will continue to monitor blood pressure and make adjustments to medication as needed  Patient's renal function will be checked with her next visit including her potassium for which she takes supplements

## 2018-02-01 NOTE — PROGRESS NOTES
Assessment/Plan:    Hypertension  Patient has a longstanding history of hypertension and in the past her blood pressure has been relatively well controlled  With the new criteria that is been set up for blood pressure control her blood pressure is actually elevated but we are unsure whether not they have changed the criteria for patient that her elderly  At this point we will continue present medication  We did stress to the patient today the importance of watching her consumption of salts in her diet  We will continue to monitor blood pressure and make adjustments to medication as needed  Patient's renal function will be checked with her next visit including her potassium for which she takes supplements  Hyperlipidemia  As previously we discussed with the patient the importance of watching her consumptions of fat and cholesterol  She states that living with her daughter this is been a lot he is here in that her daughter is watching her diet closely  We will continue to monitor her levels and make adjustments to treatment as necessary  Hyperglycemia  Patient has a history of a some slightly elevated blood sugars in the past and no evidence of diabetes  Again we discussed diet with the patient and the importance of reducing her consumption of concentrated sweets and simple carbohydrates  The patient has a slip to check on a blood sugar with her next    Osteoporosis  Patient is continuing to take her calcium and vitamin-D  With her next visit patient will be due to have a repeat DEXA scan performed for further evaluation of her osteoporosis  We will discuss with the patient that point in time whether not she may be candidate to start Prolia  Problem List Items Addressed This Visit     Hyperlipidemia     As previously we discussed with the patient the importance of watching her consumptions of fat and cholesterol    She states that living with her daughter this is been a lot he is here in that her daughter is watching her diet closely  We will continue to monitor her levels and make adjustments to treatment as necessary  Relevant Medications    atorvastatin (LIPITOR) 10 mg tablet    Hyperglycemia - Primary     Patient has a history of a some slightly elevated blood sugars in the past and no evidence of diabetes  Again we discussed diet with the patient and the importance of reducing her consumption of concentrated sweets and simple carbohydrates  The patient has a slip to check on a blood sugar with her next         Relevant Orders    Hemoglobin T8V    Basic metabolic panel    Osteoporosis     Patient is continuing to take her calcium and vitamin-D  With her next visit patient will be due to have a repeat DEXA scan performed for further evaluation of her osteoporosis  We will discuss with the patient that point in time whether not she may be candidate to start Prolia  Subjective:      Patient ID: Ivis Mitchell is a 80 y o  female  Patient is a 77-year-old female with a history of multiple medical problems including hypertension, hyperlipidemia, osteoporosis, diffuse DJD, anxiety disorder  Patient is here today for routine follow-up after four-month period of time  Patient states in general she is doing well and she has no new specific medical complaints or concerns  The following portions of the patient's history were reviewed and updated as appropriate:   She  has no past medical history on file  She  does not have any pertinent problems on file  She  has no past surgical history on file  Her family history is not on file  She  has no tobacco, alcohol, and drug history on file    Current Outpatient Prescriptions   Medication Sig Dispense Refill    atorvastatin (LIPITOR) 10 mg tablet Take by mouth      Calcium Carbonate-Vitamin D (CALCIUM 600+D) 600-200 MG-UNIT TABS Take 1 tablet by mouth daily      citalopram (CeleXA) 40 mg tablet Take 1 tablet by mouth daily      losartan-hydrochlorothiazide (HYZAAR) 100-25 MG per tablet Take 1 tablet by mouth daily      potassium chloride (K-DUR,KLOR-CON) 10 mEq tablet Take 1 tablet by mouth daily      ranitidine (ZANTAC) 150 mg tablet Take 1 tablet by mouth every 12 (twelve) hours as needed      aspirin 81 mg chewable tablet Chew Twice daily       No current facility-administered medications for this visit  No current outpatient prescriptions on file prior to visit  No current facility-administered medications on file prior to visit  She is allergic to ampicillin; sulfa antibiotics; and sulfamethoxazole-trimethoprim       Review of Systems   Constitutional: Negative for activity change, appetite change, chills, diaphoresis, fatigue, fever and unexpected weight change  HENT: Negative for congestion, dental problem, drooling, ear discharge, ear pain, facial swelling, hearing loss, mouth sores, nosebleeds, postnasal drip, rhinorrhea, sinus pain, sinus pressure, sneezing, sore throat, tinnitus, trouble swallowing and voice change  Eyes: Negative for photophobia, pain, discharge, redness, itching and visual disturbance  Respiratory: Negative for apnea, cough, choking, chest tightness, shortness of breath, wheezing and stridor  Cardiovascular: Negative for chest pain, palpitations and leg swelling  Gastrointestinal: Negative for abdominal distention, abdominal pain, anal bleeding, blood in stool, constipation, diarrhea, nausea, rectal pain and vomiting  Endocrine: Negative for cold intolerance, heat intolerance, polydipsia, polyphagia and polyuria  Genitourinary: Negative for decreased urine volume, difficulty urinating, dyspareunia, dysuria, enuresis, flank pain, frequency, genital sores, hematuria, pelvic pain, urgency, vaginal bleeding, vaginal discharge and vaginal pain  Musculoskeletal: Positive for arthralgias   Negative for back pain, gait problem, joint swelling, myalgias, neck pain and neck stiffness  Skin: Negative for color change, pallor, rash and wound  Neurological: Negative for dizziness, tremors, seizures, syncope, facial asymmetry, speech difficulty, weakness, light-headedness, numbness and headaches  Hematological: Negative for adenopathy  Does not bruise/bleed easily  Psychiatric/Behavioral: Positive for dysphoric mood (Patient states that during the winter months she seems more depressed  She has no suicidal thoughts or ideation and states that with this spring she does not anticipate further problems)  Negative for agitation, behavioral problems, confusion, decreased concentration, hallucinations, self-injury, sleep disturbance and suicidal ideas  The patient is not nervous/anxious and is not hyperactive  Objective:     Physical Exam   Constitutional: She is oriented to person, place, and time  She appears well-developed and well-nourished  No distress  Patient is a very pleasant mildly overweight 80-year-old female who is awake alert no acute distress   HENT:   Head: Normocephalic and atraumatic  Right Ear: External ear normal    Left Ear: External ear normal    Nose: Nose normal    Mouth/Throat: Oropharynx is clear and moist  No oropharyngeal exudate  Eyes: Conjunctivae and EOM are normal  Pupils are equal, round, and reactive to light  Right eye exhibits no discharge  Left eye exhibits no discharge  No scleral icterus  Neck: Normal range of motion  Neck supple  No JVD present  No tracheal deviation present  No thyromegaly present  Cardiovascular: Normal rate, regular rhythm, normal heart sounds and intact distal pulses  Exam reveals no gallop and no friction rub  No murmur heard  Pulmonary/Chest: Effort normal and breath sounds normal  No stridor  No respiratory distress  She has no wheezes  She has no rales  She exhibits no tenderness  Abdominal: Soft  Bowel sounds are normal  She exhibits no distension and no mass  There is no tenderness   There is no rebound and no guarding  Patient's abdomen is obese soft nontender   Musculoskeletal: Normal range of motion  She exhibits edema (Chronic +1 edema bilateral lower extremities secondary to venous) and deformity (Patient has diffuse mild arthritic changes to the hands and digits)  She exhibits no tenderness  Neurological: She is alert and oriented to person, place, and time  She has normal reflexes  She displays normal reflexes  No cranial nerve deficit  She exhibits normal muscle tone  Coordination normal    Skin: Skin is warm and dry  No rash noted  She is not diaphoretic  No erythema  No pallor  Psychiatric: She has a normal mood and affect  Her behavior is normal  Judgment and thought content normal    Nursing note and vitals reviewed

## 2018-02-01 NOTE — ASSESSMENT & PLAN NOTE
Patient is continuing to take her calcium and vitamin-D  With her next visit patient will be due to have a repeat DEXA scan performed for further evaluation of her osteoporosis  We will discuss with the patient that point in time whether not she may be candidate to start Prolia

## 2018-05-23 ENCOUNTER — TRANSCRIBE ORDERS (OUTPATIENT)
Dept: ADMINISTRATIVE | Age: 83
End: 2018-05-23

## 2018-05-23 ENCOUNTER — APPOINTMENT (OUTPATIENT)
Dept: LAB | Age: 83
End: 2018-05-23
Payer: MEDICARE

## 2018-05-23 DIAGNOSIS — R73.9 HYPERGLYCEMIA: ICD-10-CM

## 2018-05-23 LAB
ANION GAP SERPL CALCULATED.3IONS-SCNC: 4 MMOL/L (ref 4–13)
BUN SERPL-MCNC: 12 MG/DL (ref 5–25)
CALCIUM SERPL-MCNC: 9.1 MG/DL (ref 8.3–10.1)
CHLORIDE SERPL-SCNC: 101 MMOL/L (ref 100–108)
CO2 SERPL-SCNC: 31 MMOL/L (ref 21–32)
CREAT SERPL-MCNC: 0.84 MG/DL (ref 0.6–1.3)
EST. AVERAGE GLUCOSE BLD GHB EST-MCNC: 108 MG/DL
GFR SERPL CREATININE-BSD FRML MDRD: 65 ML/MIN/1.73SQ M
GLUCOSE P FAST SERPL-MCNC: 102 MG/DL (ref 65–99)
HBA1C MFR BLD: 5.4 % (ref 4.2–6.3)
POTASSIUM SERPL-SCNC: 4.2 MMOL/L (ref 3.5–5.3)
SODIUM SERPL-SCNC: 136 MMOL/L (ref 136–145)

## 2018-05-23 PROCEDURE — 80048 BASIC METABOLIC PNL TOTAL CA: CPT

## 2018-05-23 PROCEDURE — 83036 HEMOGLOBIN GLYCOSYLATED A1C: CPT

## 2018-05-23 PROCEDURE — 36415 COLL VENOUS BLD VENIPUNCTURE: CPT

## 2018-06-01 ENCOUNTER — OFFICE VISIT (OUTPATIENT)
Dept: INTERNAL MEDICINE CLINIC | Facility: CLINIC | Age: 83
End: 2018-06-01
Payer: MEDICARE

## 2018-06-01 VITALS
SYSTOLIC BLOOD PRESSURE: 138 MMHG | WEIGHT: 194 LBS | BODY MASS INDEX: 31.18 KG/M2 | HEIGHT: 66 IN | OXYGEN SATURATION: 98 % | DIASTOLIC BLOOD PRESSURE: 78 MMHG | HEART RATE: 72 BPM | TEMPERATURE: 99 F

## 2018-06-01 DIAGNOSIS — I10 ESSENTIAL HYPERTENSION: Primary | ICD-10-CM

## 2018-06-01 DIAGNOSIS — M81.0 AGE-RELATED OSTEOPOROSIS WITHOUT CURRENT PATHOLOGICAL FRACTURE: ICD-10-CM

## 2018-06-01 DIAGNOSIS — F41.9 ANXIETY: ICD-10-CM

## 2018-06-01 DIAGNOSIS — R73.9 HYPERGLYCEMIA: ICD-10-CM

## 2018-06-01 DIAGNOSIS — E78.00 PURE HYPERCHOLESTEROLEMIA: ICD-10-CM

## 2018-06-01 PROCEDURE — 99214 OFFICE O/P EST MOD 30 MIN: CPT | Performed by: INTERNAL MEDICINE

## 2018-06-01 NOTE — ASSESSMENT & PLAN NOTE
Osteoporosis  Patient continues to take her vitamin-D and calcium  We will discuss with the patient going back to the hospital for repeat DEXA scan and reconsider treatment depending on the results

## 2018-06-01 NOTE — ASSESSMENT & PLAN NOTE
Hypertension  As noted patient's blood pressure is showing relatively good control with present treatment  Patient does have her blood pressure checked on a regular basis at home and it [facility] and it is showing excellent control    Patient will continue with present medication and this will be checked again with her next visit we will also be checking her renal status and potassium level

## 2018-06-01 NOTE — ASSESSMENT & PLAN NOTE
Hyperglycemia  Patient did have fasting labs performed prior to the visit today and we are happy to report that her sugar is stable with a fasting blood sugar 102  She was told this was abnormal but does not indicate that she has diabetes at this time  Again we stressed to the patient the importance of watching her diet and intake of concentrated sweets and simple carbohydrates  Patient understands and this will be checked again with her next visit along with a hemoglobin A1c

## 2018-06-01 NOTE — ASSESSMENT & PLAN NOTE
Hyperlipidemia  Patient remains on Lipitor 10 mg a day for her hyperlipidemia  Patient was given a slip to check on a lipid profile when she returns to the office  She states on most occasions at least during the week she is watching her diet closely and is avoiding fats and cholesterol in her diet  She states that on the weekends this is not always the case    We will discuss further treatment if needed when she returns to the office in 4 months

## 2018-06-01 NOTE — PROGRESS NOTES
Assessment/Plan:    Hypertension  Hypertension  As noted patient's blood pressure is showing relatively good control with present treatment  Patient does have her blood pressure checked on a regular basis at home and it [facility] and it is showing excellent control  Patient will continue with present medication and this will be checked again with her next visit we will also be checking her renal status and potassium level    Hyperglycemia  Hyperglycemia  Patient did have fasting labs performed prior to the visit today and we are happy to report that her sugar is stable with a fasting blood sugar 102  She was told this was abnormal but does not indicate that she has diabetes at this time  Again we stressed to the patient the importance of watching her diet and intake of concentrated sweets and simple carbohydrates  Patient understands and this will be checked again with her next visit along with a hemoglobin A1c  Hyperlipidemia  Hyperlipidemia  Patient remains on Lipitor 10 mg a day for her hyperlipidemia  Patient was given a slip to check on a lipid profile when she returns to the office  She states on most occasions at least during the week she is watching her diet closely and is avoiding fats and cholesterol in her diet  She states that on the weekends this is not always the case  We will discuss further treatment if needed when she returns to the office in 4 months    Osteoporosis  Osteoporosis  Patient continues to take her vitamin-D and calcium  We will discuss with the patient going back to the hospital for repeat DEXA scan and reconsider treatment depending on the results  Anxiety  Anxiety disorder  Patient states that she has adapted well to living with her daughter    Patient states she rarely takes the Valium and she continues to take the Celexa and she feels that mostly she is under good control       Diagnoses and all orders for this visit:    Essential hypertension  -     Comprehensive metabolic panel; Future  -     CBC and differential; Future  -     Lipid panel; Future  -     TSH, 3rd generation with T4 reflex; Future  -     Urinalysis with reflex to microscopic; Future    Pure hypercholesterolemia  -     Comprehensive metabolic panel; Future  -     CBC and differential; Future  -     Lipid panel; Future  -     TSH, 3rd generation with T4 reflex; Future  -     Urinalysis with reflex to microscopic; Future    Hyperglycemia  -     Comprehensive metabolic panel; Future  -     CBC and differential; Future  -     Lipid panel; Future  -     TSH, 3rd generation with T4 reflex; Future  -     Urinalysis with reflex to microscopic; Future    Age-related osteoporosis without current pathological fracture  -     Vitamin D 25 hydroxy; Future    Anxiety          Subjective:      Patient ID: Odalys Carlos is a 80 y o  female  Patient is a an 70-year-old female with a history of multiple medical problems including hypertension, hyperlipidemia, hyperglycemia, osteoporosis, anxiety disorder  Patient is here today for routine follow-up after four-month period of time  Patient states in general she has been feeling well and has no new complaints or concerns  The following portions of the patient's history were reviewed and updated as appropriate:   She  has a past medical history of Anxiety; Diabetes mellitus ([facility] [address] ); 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); GERD (gastroesophageal reflux disease); Hyperlipidemia; Hypertension; and Osteoarthritis  She   Patient Active Problem List    Diagnosis Date Noted    Osteoporosis 08/14/2014    Hyperglycemia 07/25/2013    Hypertension 08/16/2012    Hyperlipidemia 08/16/2012    Anxiety 08/16/2012     She  has a past surgical history that includes Cataract extraction  Her family history includes Heart disease in her mother; Heart failure in her mother; Hypertension in her brother and father  She  reports that she has never smoked   She has never used smokeless tobacco  She reports that she does not drink alcohol or use drugs  Current Outpatient Prescriptions   Medication Sig Dispense Refill    aspirin 81 mg chewable tablet Chew Twice daily      atorvastatin (LIPITOR) 10 mg tablet Take by mouth      Calcium Carbonate-Vitamin D (CALCIUM 600+D) 600-200 MG-UNIT TABS Take 1 tablet by mouth daily      citalopram (CeleXA) 40 mg tablet Take 1 tablet by mouth daily      losartan-hydrochlorothiazide (HYZAAR) 100-25 MG per tablet Take 1 tablet by mouth daily      potassium chloride (K-DUR,KLOR-CON) 10 mEq tablet Take 1 tablet by mouth daily      ranitidine (ZANTAC) 150 mg tablet Take 1 tablet by mouth every 12 (twelve) hours as needed       No current facility-administered medications for this visit  Current Outpatient Prescriptions on File Prior to Visit   Medication Sig    aspirin 81 mg chewable tablet Chew Twice daily    atorvastatin (LIPITOR) 10 mg tablet Take by mouth    Calcium Carbonate-Vitamin D (CALCIUM 600+D) 600-200 MG-UNIT TABS Take 1 tablet by mouth daily    citalopram (CeleXA) 40 mg tablet Take 1 tablet by mouth daily    losartan-hydrochlorothiazide (HYZAAR) 100-25 MG per tablet Take 1 tablet by mouth daily    potassium chloride (K-DUR,KLOR-CON) 10 mEq tablet Take 1 tablet by mouth daily    ranitidine (ZANTAC) 150 mg tablet Take 1 tablet by mouth every 12 (twelve) hours as needed     No current facility-administered medications on file prior to visit  She is allergic to ampicillin; sulfa antibiotics; and sulfamethoxazole-trimethoprim       Review of Systems   Constitutional: Negative  HENT: Negative  Eyes: Negative  Respiratory: Negative  Cardiovascular: Positive for leg swelling  Negative for chest pain and palpitations  Chronic edema to her lower extremities which is unchanged and is not increased even with the warmer weather   Gastrointestinal: Negative  Endocrine: Negative      Genitourinary: Negative  Musculoskeletal: Positive for arthralgias  Negative for back pain, gait problem, joint swelling, myalgias, neck pain and neck stiffness  Some chronic arthritic aches and pains but nothing new or disabling at this time   Skin: Negative  Allergic/Immunologic: Negative  Neurological: Negative  Hematological: Negative  Psychiatric/Behavioral: Negative for agitation, behavioral problems, confusion, decreased concentration, dysphoric mood, hallucinations, self-injury, sleep disturbance and suicidal ideas  The patient is nervous/anxious (Patient does have a longstanding history of anxiety disorder and no specific changes recently or problems)  The patient is not hyperactive  Objective:      /78   Pulse 72   Temp 99 °F (37 2 °C)   Ht 5' 5 6" (1 666 m)   Wt 88 kg (194 lb)   SpO2 98%   BMI 31 70 kg/m²          Physical Exam   Constitutional: She is oriented to person, place, and time  She appears well-nourished  No distress  Patient is a pleasant, overweight, cheerful 26-year-old female who is awake alert in no acute distress   HENT:   Head: Normocephalic and atraumatic  Right Ear: External ear normal    Left Ear: External ear normal    Nose: Nose normal    Mouth/Throat: Oropharynx is clear and moist  No oropharyngeal exudate  Eyes: Conjunctivae and EOM are normal  Pupils are equal, round, and reactive to light  Right eye exhibits no discharge  Left eye exhibits no discharge  No scleral icterus  Neck: Normal range of motion  Neck supple  No JVD present  No tracheal deviation present  No thyromegaly present  Cardiovascular: Normal rate, regular rhythm, normal heart sounds and intact distal pulses  Exam reveals no gallop and no friction rub  No murmur heard  Pulmonary/Chest: Effort normal and breath sounds normal  No stridor  No respiratory distress  She has no wheezes  She has no rales  She exhibits no tenderness  Abdominal: Soft   Bowel sounds are normal  She exhibits no distension and no mass  There is no tenderness  There is no rebound and no guarding  Musculoskeletal: Normal range of motion  She exhibits edema and deformity  She exhibits no tenderness  Arthritic changes to the hands and digits some arthritic changes to bilateral knees but no decreased range of motion with maneuvers  Patient has no acute findings on examination today, chronic +1 edema bilateral lower extremities which is unchanged   Lymphadenopathy:     She has no cervical adenopathy  Neurological: She is alert and oriented to person, place, and time  She displays normal reflexes  No cranial nerve deficit  She exhibits normal muscle tone  Coordination normal    Skin: Skin is warm and dry  No rash noted  She is not diaphoretic  No erythema  No pallor  Psychiatric: She has a normal mood and affect  Her behavior is normal  Judgment and thought content normal    Nursing note and vitals reviewed

## 2018-06-01 NOTE — ASSESSMENT & PLAN NOTE
Anxiety disorder  Patient states that she has adapted well to living with her daughter    Patient states she rarely takes the Valium and she continues to take the Celexa and she feels that mostly she is under good control

## 2018-09-21 ENCOUNTER — APPOINTMENT (OUTPATIENT)
Dept: LAB | Age: 83
End: 2018-09-21
Payer: MEDICARE

## 2018-09-21 DIAGNOSIS — E78.00 PURE HYPERCHOLESTEROLEMIA: ICD-10-CM

## 2018-09-21 DIAGNOSIS — M81.0 AGE-RELATED OSTEOPOROSIS WITHOUT CURRENT PATHOLOGICAL FRACTURE: ICD-10-CM

## 2018-09-21 DIAGNOSIS — I10 ESSENTIAL HYPERTENSION: ICD-10-CM

## 2018-09-21 DIAGNOSIS — R73.9 HYPERGLYCEMIA: ICD-10-CM

## 2018-09-21 LAB
25(OH)D3 SERPL-MCNC: 25.4 NG/ML (ref 30–100)
ALBUMIN SERPL BCP-MCNC: 3.6 G/DL (ref 3.5–5)
ALP SERPL-CCNC: 65 U/L (ref 46–116)
ALT SERPL W P-5'-P-CCNC: 10 U/L (ref 12–78)
ANION GAP SERPL CALCULATED.3IONS-SCNC: 7 MMOL/L (ref 4–13)
AST SERPL W P-5'-P-CCNC: 13 U/L (ref 5–45)
BACTERIA UR QL AUTO: ABNORMAL /HPF
BASOPHILS # BLD AUTO: 0.03 THOUSANDS/ΜL (ref 0–0.1)
BASOPHILS NFR BLD AUTO: 0 % (ref 0–1)
BILIRUB SERPL-MCNC: 0.87 MG/DL (ref 0.2–1)
BILIRUB UR QL STRIP: NEGATIVE
BUN SERPL-MCNC: 11 MG/DL (ref 5–25)
CALCIUM SERPL-MCNC: 8.9 MG/DL (ref 8.3–10.1)
CHLORIDE SERPL-SCNC: 97 MMOL/L (ref 100–108)
CHOLEST SERPL-MCNC: 122 MG/DL (ref 50–200)
CLARITY UR: ABNORMAL
CO2 SERPL-SCNC: 28 MMOL/L (ref 21–32)
COLOR UR: YELLOW
CREAT SERPL-MCNC: 0.77 MG/DL (ref 0.6–1.3)
EOSINOPHIL # BLD AUTO: 0.04 THOUSAND/ΜL (ref 0–0.61)
EOSINOPHIL NFR BLD AUTO: 1 % (ref 0–6)
ERYTHROCYTE [DISTWIDTH] IN BLOOD BY AUTOMATED COUNT: 11.8 % (ref 11.6–15.1)
GFR SERPL CREATININE-BSD FRML MDRD: 72 ML/MIN/1.73SQ M
GLUCOSE P FAST SERPL-MCNC: 106 MG/DL (ref 65–99)
GLUCOSE UR STRIP-MCNC: NEGATIVE MG/DL
HCT VFR BLD AUTO: 35 % (ref 34.8–46.1)
HDLC SERPL-MCNC: 58 MG/DL (ref 40–60)
HGB BLD-MCNC: 11.8 G/DL (ref 11.5–15.4)
HGB UR QL STRIP.AUTO: NEGATIVE
HYALINE CASTS #/AREA URNS LPF: ABNORMAL /LPF
IMM GRANULOCYTES # BLD AUTO: 0.02 THOUSAND/UL (ref 0–0.2)
IMM GRANULOCYTES NFR BLD AUTO: 0 % (ref 0–2)
KETONES UR STRIP-MCNC: NEGATIVE MG/DL
LDLC SERPL CALC-MCNC: 47 MG/DL (ref 0–100)
LEUKOCYTE ESTERASE UR QL STRIP: ABNORMAL
LYMPHOCYTES # BLD AUTO: 1.55 THOUSANDS/ΜL (ref 0.6–4.47)
LYMPHOCYTES NFR BLD AUTO: 21 % (ref 14–44)
MCH RBC QN AUTO: 31.9 PG (ref 26.8–34.3)
MCHC RBC AUTO-ENTMCNC: 33.7 G/DL (ref 31.4–37.4)
MCV RBC AUTO: 95 FL (ref 82–98)
MONOCYTES # BLD AUTO: 0.55 THOUSAND/ΜL (ref 0.17–1.22)
MONOCYTES NFR BLD AUTO: 8 % (ref 4–12)
NEUTROPHILS # BLD AUTO: 5.19 THOUSANDS/ΜL (ref 1.85–7.62)
NEUTS SEG NFR BLD AUTO: 70 % (ref 43–75)
NITRITE UR QL STRIP: NEGATIVE
NON-SQ EPI CELLS URNS QL MICRO: ABNORMAL /HPF
NONHDLC SERPL-MCNC: 64 MG/DL
NRBC BLD AUTO-RTO: 0 /100 WBCS
PH UR STRIP.AUTO: 6 [PH] (ref 4.5–8)
PLATELET # BLD AUTO: 166 THOUSANDS/UL (ref 149–390)
PMV BLD AUTO: 10.3 FL (ref 8.9–12.7)
POTASSIUM SERPL-SCNC: 3.8 MMOL/L (ref 3.5–5.3)
PROT SERPL-MCNC: 6.8 G/DL (ref 6.4–8.2)
PROT UR STRIP-MCNC: NEGATIVE MG/DL
RBC # BLD AUTO: 3.7 MILLION/UL (ref 3.81–5.12)
RBC #/AREA URNS AUTO: ABNORMAL /HPF
SODIUM SERPL-SCNC: 132 MMOL/L (ref 136–145)
SP GR UR STRIP.AUTO: 1.01 (ref 1–1.03)
TRIGL SERPL-MCNC: 84 MG/DL
TSH SERPL DL<=0.05 MIU/L-ACNC: 1.12 UIU/ML (ref 0.36–3.74)
UROBILINOGEN UR QL STRIP.AUTO: 0.2 E.U./DL
WBC # BLD AUTO: 7.38 THOUSAND/UL (ref 4.31–10.16)
WBC #/AREA URNS AUTO: ABNORMAL /HPF

## 2018-09-21 PROCEDURE — 81001 URINALYSIS AUTO W/SCOPE: CPT

## 2018-09-21 PROCEDURE — 84443 ASSAY THYROID STIM HORMONE: CPT

## 2018-09-21 PROCEDURE — 80053 COMPREHEN METABOLIC PANEL: CPT

## 2018-09-21 PROCEDURE — 82306 VITAMIN D 25 HYDROXY: CPT

## 2018-09-21 PROCEDURE — 36415 COLL VENOUS BLD VENIPUNCTURE: CPT

## 2018-09-21 PROCEDURE — 80061 LIPID PANEL: CPT

## 2018-09-21 PROCEDURE — 85025 COMPLETE CBC W/AUTO DIFF WBC: CPT

## 2018-10-01 ENCOUNTER — OFFICE VISIT (OUTPATIENT)
Dept: INTERNAL MEDICINE CLINIC | Facility: CLINIC | Age: 83
End: 2018-10-01
Payer: MEDICARE

## 2018-10-01 VITALS
HEIGHT: 66 IN | HEART RATE: 79 BPM | SYSTOLIC BLOOD PRESSURE: 140 MMHG | WEIGHT: 191 LBS | OXYGEN SATURATION: 98 % | BODY MASS INDEX: 30.7 KG/M2 | TEMPERATURE: 98.6 F | DIASTOLIC BLOOD PRESSURE: 76 MMHG

## 2018-10-01 DIAGNOSIS — Z23 NEED FOR INFLUENZA VACCINATION: ICD-10-CM

## 2018-10-01 DIAGNOSIS — E78.00 PURE HYPERCHOLESTEROLEMIA: ICD-10-CM

## 2018-10-01 DIAGNOSIS — R73.9 HYPERGLYCEMIA: ICD-10-CM

## 2018-10-01 DIAGNOSIS — M81.0 AGE-RELATED OSTEOPOROSIS WITHOUT CURRENT PATHOLOGICAL FRACTURE: ICD-10-CM

## 2018-10-01 DIAGNOSIS — F41.9 ANXIETY: Primary | ICD-10-CM

## 2018-10-01 DIAGNOSIS — K21.00 GASTROESOPHAGEAL REFLUX DISEASE WITH ESOPHAGITIS: ICD-10-CM

## 2018-10-01 DIAGNOSIS — I10 ESSENTIAL HYPERTENSION: ICD-10-CM

## 2018-10-01 DIAGNOSIS — E87.6 HYPOKALEMIA: ICD-10-CM

## 2018-10-01 PROCEDURE — G0439 PPPS, SUBSEQ VISIT: HCPCS

## 2018-10-01 PROCEDURE — 90662 IIV NO PRSV INCREASED AG IM: CPT

## 2018-10-01 PROCEDURE — G0008 ADMIN INFLUENZA VIRUS VAC: HCPCS

## 2018-10-01 RX ORDER — RANITIDINE 150 MG/1
150 TABLET ORAL 2 TIMES DAILY
Qty: 180 TABLET | Refills: 3 | Status: SHIPPED | OUTPATIENT
Start: 2018-10-01 | End: 2018-10-17

## 2018-10-01 RX ORDER — CITALOPRAM 40 MG/1
40 TABLET ORAL DAILY
Qty: 90 TABLET | Refills: 3 | Status: SHIPPED | OUTPATIENT
Start: 2018-10-01 | End: 2018-10-17

## 2018-10-01 RX ORDER — ATORVASTATIN CALCIUM 10 MG/1
10 TABLET, FILM COATED ORAL DAILY
Qty: 90 TABLET | Refills: 0 | Status: SHIPPED | OUTPATIENT
Start: 2018-10-01 | End: 2019-02-04 | Stop reason: SDUPTHER

## 2018-10-01 RX ORDER — LOSARTAN POTASSIUM AND HYDROCHLOROTHIAZIDE 25; 100 MG/1; MG/1
1 TABLET ORAL DAILY
Qty: 90 TABLET | Refills: 3 | Status: SHIPPED | OUTPATIENT
Start: 2018-10-01 | End: 2018-10-17

## 2018-10-01 RX ORDER — POTASSIUM CHLORIDE 750 MG/1
10 CAPSULE, EXTENDED RELEASE ORAL 2 TIMES DAILY
Qty: 90 CAPSULE | Refills: 3 | Status: SHIPPED | OUTPATIENT
Start: 2018-10-01 | End: 2018-10-17

## 2018-10-01 NOTE — PROGRESS NOTES
Assessment and Plan:  Problem List Items Addressed This Visit     None        Health Maintenance Due   Topic Date Due    Urinary Incontinence Screening  09/06/2000    Pneumococcal PPSV23/PCV13 65+ Years / Low and Medium Risk (2 of 2 - PPSV23) 12/03/2015    INFLUENZA VACCINE  09/01/2018         HPI:  Patient Active Problem List   Diagnosis    Hypertension    Hyperlipidemia    Hyperglycemia    Osteoporosis    Anxiety     Past Medical History:   Diagnosis Date    Anxiety     Diabetes [facility] ([facility] )     [facility] 09[facility]28[facility]2017    or microproteinuria    GERD (gastroesophageal reflux disease)     Hyperlipidemia     Hypertension     Osteoarthritis      Past Surgical History:   Procedure Laterality Date    CATARACT EXTRACTION       Family History   Problem Relation Age of Onset    Heart failure Mother     Heart disease Mother     Hypertension Father     Hypertension Brother      History   Smoking Status    Never Smoker   Smokeless Tobacco    Never Used     History   Alcohol Use No      History   Drug Use No         Current Outpatient Prescriptions   Medication Sig Dispense Refill    aspirin 81 mg chewable tablet Chew Twice daily      atorvastatin (LIPITOR) 10 mg tablet Take by mouth      Calcium Carbonate-Vitamin D (CALCIUM 600+D) 600-200 MG-UNIT TABS Take 1 tablet by mouth daily      citalopram (CeleXA) 40 mg tablet Take 1 tablet by mouth daily      losartan-hydrochlorothiazide (HYZAAR) 100-25 MG per tablet Take 1 tablet by mouth daily      potassium chloride (K-DUR,KLOR-CON) 10 mEq tablet Take 1 tablet by mouth daily      ranitidine (ZANTAC) 150 mg tablet Take 1 tablet by mouth every 12 (twelve) hours as needed       No current facility-administered medications for this visit        Allergies   Allergen Reactions    Ampicillin      Other reaction(s): Unknown Allergic Reaction  Reaction Date: [id number];     Sulfa Antibiotics      Other reaction(s): Unknown Allergic Reaction    Sulfamethoxazole-Trimethoprim      Reaction Date: ;      Immunization History   Administered Date(s) Administered    DTaP 5 2014    Influenza Quadrivalent, 6-35 Months IM 2017    Influenza Split High Dose Preservative Free IM 2013, 2014, 2016    Influenza TIV (IM) 10/01/2010    Pneumococcal Conjugate 13-Valent 2014       Patient Care Team:  Scout Gonázles, DO as PCP - General    Medicare Screening Tests and Risk Assessments:  Jj Brush is here for her Subsequent Wellness visit  Health Risk Assessment:  Patient rates overall health as good  Patient feels that their physical health rating is Same  Eyesight was rated as Same  Hearing was rated as Same  Patient feels that their emotional and mental health rating is Same  Pain experienced by patient in the last 7 days has been Some  Emotional/Mental Health:  Patient has been feeling nervous/anxious  PHQ-9 Depression Screening:    Frequency of the following problems over the past two weeks:      1  Little interest or pleasure in doing things: 0 - not at all      2  Feeling down, depressed, or hopeless: 1 - several days      3  Trouble falling or staying asleep, or sleeping too much: 0 - not at all      4  Feeling tired or having little energy: 1 - several days      5  Poor appetite or overeatin - not at all      6  Feeling bad about yourself - or that you are a failure or have let yourself or your family down: 0 - not at all      7  Trouble concentrating on things, such as reading the newspaper or watching television: 0 - not at all      8  Moving or speaking so slowly that other people could have noticed  Or the opposite - being so fidgety or restless that you have been moving around a lot more than usual: 0 - not at all      9  Thoughts that you would be better off dead, or of hurting yourself in some way: 0 - not at all  PHQ-2 Score: 1  PHQ-9 Score: 2    Broken Bones/Falls:     Fall Risk Assessment:    In the past year, patient has experienced: No history of falling in past year          Bladder/Bowel:  Patient has not leaked urine accidently in the last six months  Patient reports no loss of bowel control  Home Safety:  Patient has trouble with stairs inside or outside of their home  Patient currently reports that there are no safety hazards present in home, working smoke alarms,     Preventative Screenings:   Breast cancer screening performed, no colon cancer screen completed, cholesterol screen completed, glaucoma eye exam completed,     Nutrition:  Current diet: Regular with servings of the following:    Medications:  Patient is currently taking over-the-counter supplements  Patient is able to manage medications  Lifestyle Choices:  Patient reports no tobacco use  Patient has smoked or used tobacco in the past   Patient has stopped her tobacco use  Tobacco use quit date: 1984  Patient reports no alcohol use  Patient drives a vehicle  Patient wears seat belt  Activities of Daily Living:  Can get out of bed by his or her self, able to dress self, able to make own meals, able to do own shopping, able to bathe self, can do own laundry/housekeeping, can manage own money, pay bills and track expenses    Previous Hospitalizations:  No hospitalization or ED visit in past 12 months        Advanced Directives:  Patient has decided on a power of   Patient has spoken to designated power of   Patient has completed advanced directive          Preventative Screening/Counseling:      Cardiovascular:      General: Risks and Benefits Discussed and Screening Current      Counseling: Healthy Diet, Healthy Weight, Improve Cholesterol, Improve Blood Pressure and Improve Exercise Tolerance          Diabetes:      General: Risks and Benefits Discussed and Screening Current      Counseling: Healthy Diet, Healthy Weight and Improve Physical Activity          Colorectal Cancer:      General: Screening Not Indicated          Breast Cancer:      General: Screening Not Indicated          Cervical Cancer:      General: Screening Not Indicated          Osteoporosis:      General: Risks and Benefits Discussed and Screening Current          AAA:      General: Screening Not Indicated          Glaucoma:      General: Screening Current          HIV:      General: Screening Not Indicated          Hepatitis C:      General: Screening Not Indicated        Advanced Directives:   Patient has living will for healthcare, has durable POA for healthcare, patient has an advanced directive  Information on ACP and/or AD provided  No 5 wishes given  End of life assessment reviewed with patient  Provider agrees with end of life decisions   Immunizations:      Influenza: Influenza Due Today      Pneumococcal: Risks & Benefits Discussed and Lifetime Vaccine Completed      Shingrix: Vaccine Status Unknown      Hepatitis B (Low risk patients): Series Not Indicated      Zostavax: Patient Declines      TD: Td Vaccine UTD      TDAP: Tdap Vaccine UTD      Other Preventative Counseling (Non-Medicare):  Alcohol Use, Fall Prevention, Helmet Safety, Increase physical activity, Nutrition Counseling, Car/seat belt/driving safety reviewed, Skin self-exam, Sunscreen use and Dietary education for weight gain          No exam data present    Physical Exam:  Review of Systems   Constitutional: Negative for activity change, appetite change, chills, diaphoresis, fatigue, fever and unexpected weight change  HENT: Negative  Eyes: Negative  Respiratory: Negative  Cardiovascular: Positive for leg swelling  Negative for chest pain and palpitations  Gastrointestinal: Negative  Negative for bowel incontinence  Endocrine: Negative  Genitourinary: Negative  Musculoskeletal: Positive for arthralgias and back pain  Negative for gait problem, joint swelling, myalgias, neck pain and neck stiffness     Skin: Positive for rash  Negative for color change, pallor and wound  Allergic/Immunologic: Negative  Neurological: Positive for headaches  Negative for dizziness, seizures, syncope, facial asymmetry, speech difficulty, weakness, light-headedness and numbness  Hematological: Negative  Psychiatric/Behavioral: Negative for agitation, behavioral problems, confusion, decreased concentration, dysphoric mood, hallucinations, self-injury, sleep disturbance and suicidal ideas  The patient is nervous/anxious  The patient is not hyperactive  Vitals:    10/01/18 0957   BP: 140/76   Pulse: 79   Temp: 98 6 °F (37 °C)   SpO2: 98%   Weight: 86 6 kg (191 lb)   Height: 5' 5 6" (1 666 m)   Body mass index is 31 21 kg/m²  Physical Exam   Constitutional: She is oriented to person, place, and time  She appears well-developed and well-nourished  No distress  Pleasant, cheerful, mildly obese 27-year-old female who is awake alert in no acute distress   HENT:   Head: Normocephalic and atraumatic  Right Ear: External ear normal    Left Ear: External ear normal    Nose: Nose normal    Mouth/Throat: Oropharynx is clear and moist  No oropharyngeal exudate  Slight decreased auditory acuity bilaterally   Eyes: Pupils are equal, round, and reactive to light  Conjunctivae and EOM are normal  Right eye exhibits no discharge  Left eye exhibits no discharge  No scleral icterus  Neck: Normal range of motion  Neck supple  No JVD present  No tracheal deviation present  No thyromegaly present  Cardiovascular: Normal rate, regular rhythm, normal heart sounds and intact distal pulses  Exam reveals no gallop and no friction rub  No murmur heard  Pulmonary/Chest: Effort normal and breath sounds normal  No stridor  No respiratory distress  She has no wheezes  She has no rales  She exhibits no tenderness  Abdominal: Soft  Bowel sounds are normal  She exhibits no distension and no mass  There is no tenderness   There is no rebound and no guarding  No hernia  Patient's abdomen is obese soft nontender with positive bowel sounds x4 quadrants   Genitourinary:   Genitourinary Comments: Patient defers examination which is acceptable for her age   Musculoskeletal: She exhibits edema and deformity  She exhibits no tenderness  Some mild degenerative changes to the hands and digits but nothing acute  Patient does have a trace edema bilateral lower extremity which is chronic  Lymphadenopathy:     She has no cervical adenopathy  Neurological: She is alert and oriented to person, place, and time  She displays normal reflexes  No cranial nerve deficit or sensory deficit  She exhibits normal muscle tone  Coordination normal    Skin: Capillary refill takes less than 2 seconds  Rash noted  She is not diaphoretic  There is erythema (Chronic rash to the left leg which is unchanged and does respond to treatment as prescribed by the dermatologist)  No pallor  Psychiatric: She has a normal mood and affect  Her behavior is normal  Judgment and thought content normal    Nursing note and vitals reviewed

## 2018-10-01 NOTE — PROGRESS NOTES
Diabetic Foot Exam    Patient's shoes and socks removed  Right Foot/Ankle   Right Foot Inspection  Skin Exam: skin normal and skin intact no dry skin, no warmth, no callus, no erythema, no maceration, no abnormal color, no pre-ulcer, no ulcer and no callus                          Toe Exam: ROM and strength within normal limits and swelling (Trace edema bilateral lower extremities)no tenderness, erythema and  no right toe deformity  Sensory   Vibration: intact  Proprioception: intact   Monofilament testing: intact  Vascular  Capillary refills: < 3 seconds  The right DP pulse is 2+  The right PT pulse is 2+  Left Foot/Ankle  Left Foot Inspection  Skin Exam: skin normal and skin intactno dry skin, no warmth, no erythema, no maceration, normal color, no pre-ulcer, no ulcer and no callus                         Toe Exam: ROM and strength within normal limits and swellingno tenderness, no erythema and no left toe deformity                   Sensory   Vibration: intact  Proprioception: intact  Monofilament: intact  Vascular  Capillary refills: < 3 seconds  The left DP pulse is 2+  The left PT pulse is 2+  Assign Risk Category:  No deformity present; No loss of protective sensation;  No weak pulses       Risk: 0

## 2018-10-17 ENCOUNTER — APPOINTMENT (EMERGENCY)
Dept: RADIOLOGY | Facility: HOSPITAL | Age: 83
End: 2018-10-17
Payer: MEDICARE

## 2018-10-17 ENCOUNTER — HOSPITAL ENCOUNTER (EMERGENCY)
Facility: HOSPITAL | Age: 83
Discharge: HOME | End: 2018-10-17
Attending: EMERGENCY MEDICINE | Admitting: EMERGENCY MEDICINE
Payer: MEDICARE

## 2018-10-17 ENCOUNTER — APPOINTMENT (EMERGENCY)
Dept: NON INVASIVE DIAGNOSTICS | Facility: HOSPITAL | Age: 83
End: 2018-10-17
Payer: MEDICARE

## 2018-10-17 VITALS
HEIGHT: 66 IN | OXYGEN SATURATION: 97 % | TEMPERATURE: 97.1 F | SYSTOLIC BLOOD PRESSURE: 128 MMHG | BODY MASS INDEX: 30.7 KG/M2 | RESPIRATION RATE: 20 BRPM | WEIGHT: 191 LBS | DIASTOLIC BLOOD PRESSURE: 71 MMHG | HEART RATE: 65 BPM

## 2018-10-17 DIAGNOSIS — S76.911A MUSCLE STRAIN OF RIGHT THIGH, INITIAL ENCOUNTER: Primary | ICD-10-CM

## 2018-10-17 DIAGNOSIS — M79.609 PAIN IN LIMB: ICD-10-CM

## 2018-10-17 PROCEDURE — 99284 EMERGENCY DEPT VISIT MOD MDM: CPT

## 2018-10-17 PROCEDURE — 93971 EXTREMITY STUDY: CPT

## 2018-10-17 PROCEDURE — 73502 X-RAY EXAM HIP UNI 2-3 VIEWS: CPT

## 2018-10-17 PROCEDURE — 93971 EXTREMITY STUDY: CPT | Performed by: SURGERY

## 2018-10-17 RX ORDER — ACETAMINOPHEN 325 MG/1
975 TABLET ORAL ONCE
Status: COMPLETED | OUTPATIENT
Start: 2018-10-17 | End: 2018-10-17

## 2018-10-17 RX ADMIN — ACETAMINOPHEN 975 MG: 325 TABLET, FILM COATED ORAL at 14:39

## 2018-10-17 NOTE — ED PROVIDER NOTES
History  Chief Complaint   Patient presents with    Leg Pain     Jeremiah states she has chronic pain in her R groin that radiates down into her leg  Li Lawrencet also has swelling that is chronic but the pain has gotten worse since Sun     Patient complains of right groin pain 3 days now radiates into inner thigh worse with flexion of hip, no injury  No relief with aspirin  Pain feels muscular but because she is not active, patient concerned with DVT  No abnl swelling noted  No CP or SOB  Prior to Admission Medications   Prescriptions Last Dose Informant Patient Reported? Taking? Calcium Carbonate-Vitamin D (CALCIUM 600+D) 600-200 MG-UNIT TABS   Yes Yes   Sig: Take 1 tablet by mouth daily   aspirin 81 mg chewable tablet   Yes Yes   Sig: Chew Twice daily   atorvastatin (LIPITOR) 10 mg tablet   No Yes   Sig: Take 1 tablet (10 mg total) by mouth daily   citalopram (CeleXA) 40 mg tablet   Yes Yes   Sig: Take 1 tablet by mouth daily   losartan-hydrochlorothiazide (HYZAAR) 100-25 MG per tablet   Yes Yes   Sig: Take 1 tablet by mouth daily   potassium chloride (K-DUR,KLOR-CON) 10 mEq tablet   Yes Yes   Sig: Take 1 tablet by mouth daily   ranitidine (ZANTAC) 150 mg tablet   Yes Yes   Sig: Take 1 tablet by mouth every 12 (twelve) hours as needed      Facility-Administered Medications: None       Past Medical History:   Diagnosis Date    Anxiety     [facility]     [facility] with [facility]albuminuria ([facility] ) 09/28/2017    or microproteinuria    GERD (gastroesophageal reflux disease)     Hyperlipidemia     Hypertension     Osteoarthritis        Past Surgical History:   Procedure Laterality Date    CATARACT EXTRACTION         Family History   Problem Relation Age of Onset    Heart failure Mother     Heart disease Mother     Hypertension Father     Hypertension Brother      I have reviewed and agree with the history as documented      Social History   Substance Use Topics    Smoking status: Former Smoker    Smokeless tobacco: Never Used    Alcohol use No        Review of Systems   All other systems reviewed and are negative  Physical Exam  Physical Exam   Constitutional: She is oriented to person, place, and time  She appears well-developed and well-nourished  HENT:   Mouth/Throat: Oropharynx is clear and moist    Eyes: Pupils are equal, round, and reactive to light  Conjunctivae and EOM are normal    Neck: Normal range of motion  Neck supple  No spinous process tenderness present  Cardiovascular: Normal rate, regular rhythm, normal heart sounds and intact distal pulses  Pulmonary/Chest: Effort normal and breath sounds normal  No respiratory distress  She has no wheezes  Abdominal: Soft  Bowel sounds are normal  She exhibits no distension  There is no tenderness  Musculoskeletal:        Right hip: She exhibits decreased range of motion  She exhibits no tenderness, no bony tenderness and no deformity  Lumbar back: She exhibits no tenderness, no bony tenderness and no pain  Right upper leg: She exhibits tenderness  She exhibits no bony tenderness and no swelling  Legs:  Neurological: She is alert and oriented to person, place, and time  She has normal strength  No sensory deficit  GCS eye subscore is 4  GCS verbal subscore is 5  GCS motor subscore is 6  Skin: Skin is warm and dry  No rash noted  Psychiatric: She has a normal mood and affect  Nursing note and vitals reviewed        Vital Signs  ED Triage Vitals   Temperature Pulse Respirations Blood Pressure SpO2   10/17/18 1328 10/17/18 1328 10/17/18 1328 10/17/18 1328 10/17/18 1328   (!) 97 1 °F (36 2 °C) 78 20 (!) 181/88 95 %      Temp src Heart Rate Source Patient Position - Orthostatic VS BP Location FiO2 (%)   -- 10/17/18 1445 10/17/18 1445 10/17/18 1445 --    Monitor Lying Right arm       Pain Score       10/17/18 1328       Worst Possible Pain           Vitals:    10/17/18 1445 10/17/18 1500 10/17/18 1515 10/17/18 1530   BP: 158/74 145/73 134/71 128/71   Pulse: 71 68 68 65   Patient Position - Orthostatic VS: Lying          Visual Acuity      ED Medications  Medications   acetaminophen (TYLENOL) tablet 975 mg (975 mg Oral Given 10/17/18 1439)       Diagnostic Studies  Results Reviewed     None                 VAS lower limb venous duplex study, unilateral/limited   Final Result by Rosalino Howard MD (10/17 1527)      XR hip/pelv 2-3 vws right if performed   Final Result by Quinn 6, DO (10/17 1444)      No acute osseous abnormality  Workstation performed: [id number]                    Procedures  Procedures       Phone Contacts  ED Phone Contact    ED Course                               MDM  Number of Diagnoses or Management Options  Muscle strain of right thigh, initial encounter: new and requires workup     Amount and/or Complexity of Data Reviewed  Tests in the radiology section of CPT®: ordered and reviewed    Patient Progress  Patient progress: improved    CritCare Time    Disposition  Final diagnoses:   Muscle strain of right thigh, initial encounter     Time reflects when diagnosis was documented in both MDM as applicable and the Disposition within this note     Time User Action Codes Description Comment    10/17/2018  3:19 PM Sammi Truong Add [M79 [id number]] Pain in limb     10/17/2018  3:31 PM Kaylene Gordon Add [M80 [id number]] Muscle strain of right thigh, initial encounter       ED Disposition     ED Disposition Condition Comment    Discharge  Yvette Maker discharge to home/self care      Condition at discharge: Good        Follow-up Information    None         Discharge Medication List as of 10/17/2018  3:32 PM      CONTINUE these medications which have NOT CHANGED    Details   aspirin 81 mg chewable tablet Chew Twice daily, Historical Med      atorvastatin (LIPITOR) 10 mg tablet Take 1 tablet (10 mg total) by mouth daily, Starting Mon 10/1/2018, Normal      Calcium Carbonate-Vitamin D (CALCIUM 600+D) 600-200 MG-UNIT TABS Take 1 tablet by mouth daily, Starting Thu 9/28/2017, Historical Med      citalopram (CeleXA) 40 mg tablet Take 1 tablet by mouth daily, Starting Tue 1/11/2011, Historical Med      losartan-hydrochlorothiazide (HYZAAR) 100-25 MG per tablet Take 1 tablet by mouth daily, Starting Fri 11/15/2013, Historical Med      potassium chloride (K-DUR,KLOR-CON) 10 mEq tablet Take 1 tablet by mouth daily, Starting Thu 9/8/2016, Historical Med      ranitidine (ZANTAC) 150 mg tablet Take 1 tablet by mouth every 12 (twelve) hours as needed, Starting Tue 1/11/2011, Historical Med           No discharge procedures on file      ED Provider  Electronically Signed by           Martina Haas DO  10/17/18 1956

## 2018-10-17 NOTE — DISCHARGE INSTRUCTIONS
Groin Strain   AMBULATORY CARE:   A groin strain  occurs when a muscle or tendon is stretched or torn  The groin is the area where your abdomen meets your upper leg  Tendons are cords of tissue that attach muscle to bone  Common symptoms include the following:   · Pain and tenderness in the inside of your thigh  · Pain when you bring your legs together  · Pain when you lift your knee  Contact your healthcare provider if:   · You have increased swelling and pain in your injured area  · You have increased groin pain when standing or with movement  · You have questions or concerns about your injury or treatment  Treatment for a groin strain  may include pain medicine  You may also need a support device, such as crutches or a cane, to decrease pain as you move around  Care for your groin strain:   · Rest  to help decrease the risk of more damage to your groin  Avoid activities that cause you pain  Use crutches or a cane as directed  · Apply ice  on your groin for 15 to 20 minutes every hour or as directed  Use an ice pack, or put crushed ice in a plastic bag  Cover it with a towel  Ice helps prevent tissue damage and decreases swelling and pain  · Wrap your groin:  Your healthcare provider will instruct you on how to wrap your groin with an elastic bandage or tape  When you wrap your groin, it becomes more stable  Wrapping your groin can help decrease your pain  · Elevate  the leg on your injured side as often as you can  This will help decrease swelling and pain in your groin  Prop your leg on pillows or blankets to keep it elevated comfortably  Activity:  You may need to exercise the injured area after your pain and swelling have decreased  Exercises will help prevent stiffness in the injured area and increase strength  Return to your normal activities slowly  You could injure yourself again if you try to return to normal activity too soon   Return to your normal level of activity when:  · You do not have pain when you walk, run, or jump  · Your injured leg moves like your uninjured leg  · Your injured leg feels as strong as your uninjured leg  Prevent another groin strain:   · Warm up and stretch before you exercise  · Wear shoes that fit well  · Wear equipment to protect yourself when you play sports  · Do exercises as directed to build muscle strength  Follow up with your healthcare provider as directed:  Write down your questions so you remember to ask them during your visits  © 2017 [employer] Information is for End User's use only and may not be sold, redistributed or otherwise used for commercial purposes  All illustrations and images included in CareNotes® are the copyrighted property of [employer] , [employer]  or [employer]  The above information is an  only  It is not intended as medical advice for individual conditions or treatments  Talk to your doctor, nurse or pharmacist before following any medical regimen to see if it is safe and effective for you

## 2018-10-17 NOTE — ED NOTES
Provider at bedside discussing results with patient and patient family        Aamir Zuñiga RN  10/17/18 2210

## 2019-01-25 ENCOUNTER — APPOINTMENT (OUTPATIENT)
Dept: LAB | Age: 84
End: 2019-01-25
Payer: MEDICARE

## 2019-01-25 DIAGNOSIS — R73.9 HYPERGLYCEMIA: ICD-10-CM

## 2019-01-25 LAB
ANION GAP SERPL CALCULATED.3IONS-SCNC: 6 MMOL/L (ref 4–13)
BUN SERPL-MCNC: 13 MG/DL (ref 5–25)
CALCIUM SERPL-MCNC: 9 MG/DL (ref 8.3–10.1)
CHLORIDE SERPL-SCNC: 101 MMOL/L (ref 100–108)
CO2 SERPL-SCNC: 27 MMOL/L (ref 21–32)
CREAT SERPL-MCNC: 0.85 MG/DL (ref 0.6–1.3)
EST. AVERAGE GLUCOSE BLD GHB EST-MCNC: 117 MG/DL
GFR SERPL CREATININE-BSD FRML MDRD: 64 ML/MIN/1.73SQ M
GLUCOSE P FAST SERPL-MCNC: 97 MG/DL (ref 65–99)
HBA1C MFR BLD: 5.7 % (ref 4.2–6.3)
POTASSIUM SERPL-SCNC: 3.7 MMOL/L (ref 3.5–5.3)
SODIUM SERPL-SCNC: 134 MMOL/L (ref 136–145)

## 2019-01-25 PROCEDURE — 80048 BASIC METABOLIC PNL TOTAL CA: CPT

## 2019-01-25 PROCEDURE — 83036 HEMOGLOBIN GLYCOSYLATED A1C: CPT

## 2019-01-25 PROCEDURE — 36415 COLL VENOUS BLD VENIPUNCTURE: CPT

## 2019-02-04 ENCOUNTER — OFFICE VISIT (OUTPATIENT)
Dept: INTERNAL MEDICINE CLINIC | Facility: CLINIC | Age: 84
End: 2019-02-04
Payer: MEDICARE

## 2019-02-04 VITALS
HEIGHT: 66 IN | BODY MASS INDEX: 29.89 KG/M2 | WEIGHT: 186 LBS | DIASTOLIC BLOOD PRESSURE: 64 MMHG | OXYGEN SATURATION: 98 % | HEART RATE: 78 BPM | TEMPERATURE: 98.4 F | SYSTOLIC BLOOD PRESSURE: 130 MMHG

## 2019-02-04 DIAGNOSIS — E78.00 PURE HYPERCHOLESTEROLEMIA: ICD-10-CM

## 2019-02-04 DIAGNOSIS — Z23 ENCOUNTER FOR IMMUNIZATION: Primary | ICD-10-CM

## 2019-02-04 DIAGNOSIS — M81.0 AGE-RELATED OSTEOPOROSIS WITHOUT CURRENT PATHOLOGICAL FRACTURE: ICD-10-CM

## 2019-02-04 DIAGNOSIS — E66.09 CLASS 1 OBESITY DUE TO EXCESS CALORIES WITHOUT SERIOUS COMORBIDITY WITH BODY MASS INDEX (BMI) OF 30.0 TO 30.9 IN ADULT: ICD-10-CM

## 2019-02-04 DIAGNOSIS — E66.9 OBESITY (BMI 30.0-34.9): ICD-10-CM

## 2019-02-04 DIAGNOSIS — F41.9 ANXIETY: ICD-10-CM

## 2019-02-04 DIAGNOSIS — I10 ESSENTIAL HYPERTENSION: ICD-10-CM

## 2019-02-04 DIAGNOSIS — R73.9 HYPERGLYCEMIA: ICD-10-CM

## 2019-02-04 PROBLEM — E66.811 CLASS 1 OBESITY DUE TO EXCESS CALORIES IN ADULT: Status: ACTIVE | Noted: 2019-02-04

## 2019-02-04 PROCEDURE — 99214 OFFICE O/P EST MOD 30 MIN: CPT | Performed by: INTERNAL MEDICINE

## 2019-02-04 PROCEDURE — 90732 PPSV23 VACC 2 YRS+ SUBQ/IM: CPT

## 2019-02-04 PROCEDURE — G0009 ADMIN PNEUMOCOCCAL VACCINE: HCPCS

## 2019-02-04 RX ORDER — ATORVASTATIN CALCIUM 10 MG/1
10 TABLET, FILM COATED ORAL DAILY
Qty: 90 TABLET | Refills: 0 | Status: SHIPPED | OUTPATIENT
Start: 2019-02-04 | End: 2019-02-04

## 2019-02-04 RX ORDER — ATORVASTATIN CALCIUM 10 MG/1
10 TABLET, FILM COATED ORAL DAILY
Qty: 90 TABLET | Refills: 3 | Status: SHIPPED | OUTPATIENT
Start: 2019-02-04 | End: 2019-10-24 | Stop reason: SDUPTHER

## 2019-02-04 NOTE — ASSESSMENT & PLAN NOTE
Patient's fasting blood sugars normal   Hemoglobin A1c is normal   Patient understands the importance of watching her diet, eliminating concentrated sweets and simple carbohydrates from her diet    She will be working on her exercise program in the near future

## 2019-02-04 NOTE — ASSESSMENT & PLAN NOTE
Patient remains on atorvastatin at 10 mg daily  We will continue to monitor her cholesterol and make adjustment of medication as needed  A new prescription was sent to the pharmacy  Patient understands the importance of watching fats and cholesterol in her diet

## 2019-02-04 NOTE — ASSESSMENT & PLAN NOTE
With patient's BMI she is considered obese  She is borderline  We did discuss the importance again with the patient about watching her diet, caloric intake and she is commended on the fact that she has actually lost weight since her last visit  Patient states that she intends to start an exercise program in the near future in order to help her lose more weight

## 2019-02-04 NOTE — PATIENT INSTRUCTIONS
Low Fat Diet   AMBULATORY CARE:   A low-fat diet  is an eating plan that is low in total fat, unhealthy fat, and cholesterol  You may need to follow a low-fat diet if you have trouble digesting or absorbing fat  You may also need to follow this diet if you have high cholesterol  You can also lower your cholesterol by increasing the amount of fiber in your diet  Soluble fiber is a type of fiber that helps to decrease cholesterol levels  Different types of fat in food:   · Limit unhealthy fats  A diet that is high in cholesterol, saturated fat, and trans fat may cause unhealthy cholesterol levels  Unhealthy cholesterol levels increase your risk of heart disease  ¨ Cholesterol:  Limit intake of cholesterol to less than 200 mg per day  Cholesterol is found in meat, eggs, and dairy  ¨ Saturated fat:  Limit saturated fat to less than 7% of your total daily calories  Ask your dietitian how many calories you need each day  Saturated fat is found in butter, cheese, ice cream, whole milk, and palm oil  Saturated fat is also found in meat, such as beef, pork, chicken skin, and processed meats  Processed meats include sausage, hot dogs, and bologna  ¨ Trans fat:  Avoid trans fat as much as possible  Trans fat is used in fried and baked foods  Foods that say trans fat free on the label may still have up to 0 5 grams of trans fat per serving  · Include healthy fats  Replace foods that are high in saturated and trans fat with foods high in healthy fats  This may help to decrease high cholesterol levels  ¨ Monounsaturated fats: These are found in avocados, nuts, and vegetable oils, such as olive, canola, and sunflower oil  ¨ Polyunsaturated fats: These can be found in vegetable oils, such as soybean or corn oil  Omega-3 fats can help to decrease the risk of heart disease  Omega-3 fats are found in fish, such as salmon, herring, trout, and tuna   Omega-3 fats can also be found in plant foods, such as walnuts, flaxseed, soybeans, and canola oil    Foods to limit or avoid:   · Grains:      ¨ Snacks that are made with partially hydrogenated oils, such as chips, regular crackers, and butter-flavored popcorn    ¨ High-fat baked goods, such as biscuits, croissants, doughnuts, pies, cookies, and pastries    · Dairy:      ¨ Whole milk, 2% milk, and yogurt and ice cream made with whole milk    ¨ Half and half creamer, heavy cream, and whipping cream    ¨ Cheese, cream cheese, and sour cream    · Meats and proteins:      ¨ High-fat cuts of meat (T-bone steak, regular hamburger, and ribs)    ¨ Fried meat, poultry (turkey and chicken), and fish    ¨ Poultry (chicken and turkey) with skin    ¨ Cold cuts (salami or bologna), hot dogs, leon, and sausage    ¨ Whole eggs and egg yolks    · Vegetables and fruits with added fat:      ¨ Fried vegetables or vegetables in butter or high-fat sauces, such as cream or cheese sauces    ¨ Fried fruit or fruit served with butter or cream    · Fats:      ¨ Butter, stick margarine, and shortening    ¨ Coconut, palm oil, and palm kernel oil  Foods to include:   · Grains:      ¨ Whole-grain breads, cereals, pasta, and brown rice    ¨ Low-fat crackers and pretzels    · Vegetables and fruits:      ¨ Fresh, frozen, or canned vegetables (no salt or low-sodium)    ¨ Fresh, frozen, dried, or canned fruit (canned in light syrup or fruit juice)    ¨ Avocado    · Low-fat dairy products:      ¨ Nonfat (skim) or 1% milk    ¨ Nonfat or low-fat cheese, yogurt, and cottage cheese    · Meats and proteins:      ¨ Chicken or turkey with no skin    ¨ Baked or broiled fish    ¨ Lean beef and pork (loin, round, extra lean hamburger)    ¨ Beans and peas, unsalted nuts, soy products    ¨ Egg whites and substitutes    ¨ Seeds and nuts    · Fats:      ¨ Unsaturated oil, such as canola, olive, peanut, soybean, or sunflower oil    ¨ Soft or liquid margarine and vegetable oil spread    ¨ Low-fat salad dressing  Other ways to decrease fat:   · Read food labels before you buy foods  Choose foods that have less than 30% of calories from fat  Choose low-fat or fat-free dairy products  Remember that fat free does not mean calorie free  These foods still contain calories, and too many calories can lead to weight gain  · Trim fat from meat and avoid fried food  Trim all visible fat from meat before you cook it  Remove the skin from poultry  Do not oconnell meat, fish, or poultry  Bake, roast, boil, or broil these foods instead  Avoid fried foods  Eat a baked potato instead of [employer]mi fries  Steam vegetables instead of sautéing them in butter  · Add less fat to foods  Use imitation leon bits on salads and baked potatoes instead of regular leon bits  Use fat-free or low-fat salad dressings instead of regular dressings  Use low-fat or nonfat butter-flavored topping instead of regular butter or margarine on popcorn and other foods  Ways to decrease fat in recipes:  Replace high-fat ingredients with low-fat or nonfat ones  This may cause baked goods to be drier than usual  You may need to use nonfat cooking spray on pans to prevent food from sticking  You also may need to change the amount of other ingredients, such as water, in the recipe  Try the following:  · Use low-fat or light margarine instead of regular margarine or shortening  · Use lean ground turkey breast or chicken, or lean ground beef (less than 5% fat) instead of hamburger  · Add 1 teaspoon of canola oil to 8 ounces of skim milk instead of using cream or half and half  · Use grated zucchini, carrots, or apples in breads instead of coconut  · Use blenderized, low-fat cottage cheese, plain tofu, or low-fat ricotta cheese instead of cream cheese  · Use 1 egg white and 1 teaspoon of canola oil, or use ¼ cup (2 ounces) of fat-free egg substitute instead of a whole egg       · Replace half of the oil that is called for in a recipe with applesauce when you bake  Use 3 tablespoons of cocoa powder and 1 tablespoon of canola oil instead of a square of baking chocolate  How to increase fiber:  Eat enough high-fiber foods to get 20 to 30 grams of fiber every day  Slowly increase your fiber intake to avoid stomach cramps, gas, and other problems  · Eat 3 ounces of whole-grain foods each day  An ounce is about 1 slice of bread  Eat whole-grain breads, such as whole-wheat bread  Whole wheat, whole-wheat flour, or other whole grains should be listed as the first ingredient on the food label  Replace white flour with whole-grain flour or use half of each in recipes  Whole-grain flour is heavier than white flour, so you may have to add more yeast or baking powder  · Eat a high-fiber cereal for breakfast   Oatmeal is a good source of soluble fiber  Look for cereals that have bran or fiber in the name  Choose whole-grain products, such as brown rice, barley, and whole-wheat pasta  · Eat more beans, peas, and lentils  For example, add beans to soups or salads  Eat at least 5 cups of fruits and vegetables each day  Eat fruits and vegetables with the peel because the peel is high in fiber  © 2017 [employer] Information is for End User's use only and may not be sold, redistributed or otherwise used for commercial purposes  All illustrations and images included in CareNotes® are the copyrighted property of [employer]  or [employer]  The above information is an  only  It is not intended as medical advice for individual conditions or treatments  Talk to your doctor, nurse or pharmacist before following any medical regimen to see if it is safe and effective for you  Heart Healthy Diet   AMBULATORY CARE:   A heart healthy diet  is an eating plan low in total fat, unhealthy fats, and sodium (salt)  A heart healthy diet helps decrease your risk for heart disease and stroke   Limit the amount of fat you eat to 25% to 35% of your total daily calories  Limit sodium to less than 2,300 mg each day  Healthy fats:  Healthy fats can help improve cholesterol levels  The risk for heart disease is decreased when cholesterol levels are normal  Choose healthy fats, such as the following:  · Unsaturated fat  is found in foods such as soybean, canola, olive, corn, and safflower oils  It is also found in soft tub margarine that is made with liquid vegetable oil  · Omega-3 fat  is found in certain fish, such as salmon, tuna, and trout, and in walnuts and flaxseed  Unhealthy fats:  Unhealthy fats can cause unhealthy cholesterol levels in your blood and increase your risk of heart disease  Limit unhealthy fats, such as the following:  · Cholesterol  is found in animal foods, such as eggs and lobster, and in dairy products made from whole milk  Limit cholesterol to less than 300 milligrams (mg) each day  You may need to limit cholesterol to 200 mg each day if you have heart disease  · Saturated fat  is found in meats, such as leon and hamburger  It is also found in chicken or turkey skin, whole milk, and butter  Limit saturated fat to less than 7% of your total daily calories  Limit saturated fat to less than 6% if you have heart disease or are at increased risk for it  · Trans fat  is found in packaged foods, such as potato chips and cookies  It is also in hard margarine, some fried foods, and shortening  Avoid trans fats as much as possible    Heart healthy foods and drinks to include:  Ask your dietitian or healthcare provider how many servings to have from each of the following food groups:  · Grains:      ¨ Whole-wheat breads, cereals, and pastas, and brown rice    ¨ Low-fat, low-sodium crackers and chips    · Vegetables:      ¨ Broccoli, green beans, green peas, and spinach    ¨ Collards, kale, and lima beans    ¨ Carrots, sweet potatoes, tomatoes, and peppers    ¨ Canned vegetables with no salt added    · Fruits:      ¨ Bananas, peaches, pears, and pineapple    ¨ Grapes, raisins, and dates    ¨ Oranges, tangerines, grapefruit, orange juice, and grapefruit juice    ¨ Apricots, mangoes, melons, and papaya    ¨ Raspberries and strawberries    ¨ Canned fruit with no added sugar    · Low-fat dairy products:      ¨ Nonfat (skim) milk, 1% milk, and low-fat almond, cashew, or soy milks fortified with calcium    ¨ Low-fat cheese, regular or frozen yogurt, and cottage cheese    · Meats and proteins , such as lean cuts of beef and pork (loin, leg, round), skinless chicken and turkey, legumes, soy products, egg whites, and nuts  Foods and drinks to limit or avoid:  Ask your dietitian or healthcare provider about these and other foods that are high in unhealthy fat, sodium, and sugar:  · Snack or packaged foods , such as frozen dinners, cookies, macaroni and cheese, and cereals with more than 300 mg of sodium per serving    · Canned or dry mixes  for cakes, soups, sauces, or gravies    · Vegetables with added sodium , such as instant potatoes, vegetables with added sauces, or regular canned vegetables    · Other foods high in sodium , such as ketchup, barbecue sauce, salad dressing, pickles, olives, soy sauce, and miso    · High-fat dairy foods  such as whole or 2% milk, cream cheese, or sour cream, and cheeses     · High-fat protein foods  such as high-fat cuts of beef (T-bone steaks, ribs), chicken or turkey with skin, and organ meats, such as liver    · Cured or smoked meats , such as hot dogs, leon, and sausage    · Unhealthy fats and oils , such as butter, stick margarine, shortening, and cooking oils such as coconut or palm oil    · Food and drinks high in sugar , such as soft drinks (soda), sports drinks, sweetened tea, candy, cake, cookies, pies, and doughnuts  Other diet guidelines to follow:   · Eat more foods containing omega-3 fats  Eat fish high in omega-3 fats at least 2 times a week  · Limit alcohol    Too much alcohol can damage your heart and raise your blood pressure  Women should limit alcohol to 1 drink a day  Men should limit alcohol to 2 drinks a day  A drink of alcohol is 12 ounces of beer, 5 ounces of wine, or 1½ ounces of liquor  · Choose low-sodium foods  High-sodium foods can lead to high blood pressure  Add little or no salt to food you prepare  Use herbs and spices in place of salt  · Eat more fiber  to help lower cholesterol levels  Eat at least 5 servings of fruits and vegetables each day  Eat 3 ounces of whole-grain foods each day  Legumes (beans) are also a good source of fiber  Lifestyle guidelines:   · Do not smoke  Nicotine and other chemicals in cigarettes and cigars can cause lung and heart damage  Ask your healthcare provider for information if you currently smoke and need help to quit  E-cigarettes or smokeless tobacco still contain nicotine  Talk to your healthcare provider before you use these products  · Exercise regularly  to help you maintain a healthy weight and improve your blood pressure and cholesterol levels  Ask your healthcare provider about the best exercise plan for you  Do not start an exercise program without asking your healthcare provider  Follow up with your healthcare provider as directed:  Write down your questions so you remember to ask them during your visits  © 2017 [address] [facility] Information is for End User's use only and may not be sold, redistributed or otherwise used for commercial purposes  All illustrations and images included in CareNotes® are the copyrighted property of [employer] , [facility]  or [employer] Ramos  The above information is an  only  It is not intended as medical advice for individual conditions or treatments  Talk to your doctor, nurse or pharmacist before following any medical regimen to see if it is safe and effective for you  Calorie Counting Diet   WHAT YOU NEED TO KNOW:   What is a calorie counting diet?   It is a meal plan based on counting calories each day to reach a healthy body weight  You will need to eat fewer calories if you are trying to lose weight  Weight loss may decrease your risk for certain health problems or improve your health if you have health problems  Some of these health problems include heart disease, high blood pressure, and diabetes  What foods should I avoid? Your dietitian will tell you if you need to avoid certain foods based on your body weight and health condition  You may need to avoid high-fat foods if you are at risk for or have heart disease  You may need to eat fewer foods from the breads and starches food group if you have diabetes  How many calories are in foods? The following is a list of foods and drinks with the approximate number of calories in each  Check the food label to find the exact number of calories  A dietitian can tell you how many calories you should have from each food group each day    · Carbohydrate:      ¨ ½ of a 3-inch bagel, 1 slice of bread, or ½ of a hamburger bun or hot dog bun (80)    ¨ 1 (8-inch) flour tortilla or ½ cup of cooked rice (100)    ¨ 1 (6-inch) corn tortilla (80)    ¨ 1 (6-inch) pancake or 1 cup of bran flakes cereal (110)    ¨ ½ cup of cooked cereal (80)    ¨ ½ cup of cooked pasta (85)    ¨ 1 ounce of pretzels (100)    ¨ 3 cups of air-popped popcorn without butter or oil (80)    · Dairy:      ¨ 1 cup of skim or 1% milk (90)    ¨ 1 cup of 2% milk (120)    ¨ 1 cup of whole milk (160)    ¨ 1 cup of 2% chocolate milk (220)    ¨ 1 ounce of low-fat cheese with 3 grams of fat per ounce (70)    ¨ 1 ounce of cheddar cheese (114)    ¨ ½ cup of 1% fat cottage cheese (80)    ¨ 1 cup of plain or sugar-free, fat-free yogurt (90)    · Protein foods:      ¨ 3 ounces of fish (not breaded or fried) (95)    ¨ 3 ounces of breaded, fried fish (195)    ¨ ¾ cup of tuna canned in water (105)    ¨ 3 ounces of chicken breast without skin (105)    ¨ 1 fried chicken breast with skin (350)    ¨ ¼ cup of fat free egg substitute (40)    ¨ 1 large egg (75)    ¨ 3 ounces of lean beef or pork (165)    ¨ 3 ounces of fried pork chop or ham (185)    ¨ ½ cup of cooked dried beans, such as kidney, taylor, lentils, or navy (115)    ¨ 3 ounces of bologna or lunch meat (225)    ¨ 2 links of breakfast sausage (140)    · Vegetables:      ¨ ½ cup of sliced mushrooms (10)    ¨ 1 cup of salad greens, such as lettuce, spinach, or lizzette (15)    ¨ ½ cup of steamed asparagus (20)    ¨ ½ cup of cooked summer squash, zucchini squash, or green or wax beans (25)    ¨ 1 cup of broccoli or cauliflower florets, or 1 medium tomato (25)    ¨ 1 large raw carrot or ½ cup of cooked carrots (40)    ¨ ? of a medium cucumber or 1 stalk of celery (5)    ¨ 1 small baked potato (160)    ¨ 1 cup of breaded, fried vegetables (230)    · Fruit:      ¨ 1 (6-inch) banana (55)     ¨ ½ of a 4-inch grapefruit (55)    ¨ 15 grapes (60)    ¨ 1 medium orange or apple (70)    ¨ 1 large peach (65)    ¨ 1 cup of fresh pineapple chunks (75)    ¨ 1 cup of melon cubes (50)    ¨ 1¼ cups of whole strawberries (45)    ¨ ½ cup of fruit canned in juice (55)    ¨ ½ cup of fruit canned in heavy syrup (110)    ¨ ?  cup of raisins (130)    ¨ ½ cup of unsweetened fruit juice (60)    ¨ ½ cup of grape, cranberry, or prune juice (90)    · Fat:      ¨ 10 peanuts or 2 teaspoons of peanut butter (55)    ¨ 2 tablespoons of avocado or 1 tablespoon of regular salad dressing (45)    ¨ 2 slices of leon (90)    ¨ 1 teaspoon of oil, such as safflower, canola, corn, or olive oil (45)    ¨ 2 teaspoons of low-fat margarine, or 1 tablespoon of low-fat mayonnaise (50)    ¨ 1 teaspoon of regular margarine (40)    ¨ 1 tablespoon of regular mayonnaise (135)    ¨ 1 tablespoon of cream cheese or 2 tablespoons of low-fat cream cheese (45)    ¨ 2 tablespoons of vegetable shortening (215)    · Dessert and sweets:      ¨ 8 animal crackers or 5 vanilla wafers (80)    ¨ 1 frozen fruit juice bar (80)    ¨ ½ cup of ice milk or low-fat frozen yogurt (90)    ¨ ½ cup of sherbet or sorbet (125)    ¨ ½ cup of sugar-free pudding or custard (60)    ¨ ½ cup of ice cream (140)    ¨ ½ cup of pudding or custard (175)    ¨ 1 (2-inch) square chocolate brownie (185)    · Combination foods:      ¨ Bean burrito made with an 8-inch tortilla, without cheese (275)    ¨ Chicken breast sandwich with lettuce and tomato (325)    ¨ 1 cup of chicken noodle soup (60)    ¨ 1 beef taco (175)    ¨ Regular hamburger with lettuce and tomato (310)    ¨ Regular cheeseburger with lettuce and tomato (410)     ¨ ¼ of a 12-inch cheese pizza (280)    ¨ Fried fish sandwich with lettuce and tomato (425)    ¨ Hot dog and bun (275)    ¨ 1½ cups of macaroni and cheese (310)    ¨ Taco salad with a fried tortilla shell (870)    · Low-calorie foods:      ¨ 1 tablespoon of ketchup or 1 tablespoon of fat free sour cream (15)    ¨ 1 teaspoon of mustard (5)    ¨ ¼ cup of salsa (20)    ¨ 1 large dill pickle (15)    ¨ 1 tablespoon of fat free salad dressing (10)    ¨ 2 teaspoons of low-sugar, light jam or jelly, or 1 tablespoon of sugar-free syrup (15)    ¨ 1 sugar-free popsicle (15)    ¨ 1 cup of club soda, seltzer water, or diet soda (0)  CARE AGREEMENT:   You have the right to help plan your care  Discuss treatment options with your caregivers to decide what care you want to receive  You always have the right to refuse treatment  The above information is an  only  It is not intended as medical advice for individual conditions or treatments  Talk to your doctor, nurse or pharmacist before following any medical regimen to see if it is safe and effective for you  © 2017 [address] [employer] Information is for End User's use only and may not be sold, redistributed or otherwise used for commercial purposes   All illustrations and images included in CareNotes® are the copyrighted property of [employer]  or [employer]

## 2019-02-04 NOTE — ASSESSMENT & PLAN NOTE
Patient was due to have a DEXA scan performed but this was canceled by the hospital   She states she has another appointment in the near future and hopefully we we can determine whether there has been progression of her disease an she may be a candidate for Prolia  We will wait to evaluate the results

## 2019-02-04 NOTE — ASSESSMENT & PLAN NOTE
Patient's blood pressure is still showing excellent control with present treatment    Patient will continue with present medication and we reinforced the importance of continuing to take her potassium pills with a history of hyperkalemia in the past

## 2019-02-04 NOTE — PROGRESS NOTES
Assessment/Plan:    Hypertension  Patient's blood pressure is still showing excellent control with present treatment  Patient will continue with present medication and we reinforced the importance of continuing to take her potassium pills with a history of hyperkalemia in the past     Osteoporosis  Patient was due to have a DEXA scan performed but this was canceled by the hospital   She states she has another appointment in the near future and hopefully we we can determine whether there has been progression of her disease an she may be a candidate for Prolia  We will wait to evaluate the results  Hyperlipidemia  Patient remains on atorvastatin at 10 mg daily  We will continue to monitor her cholesterol and make adjustment of medication as needed  A new prescription was sent to the pharmacy  Patient understands the importance of watching fats and cholesterol in her diet  Hyperglycemia  Patient's fasting blood sugars normal   Hemoglobin A1c is normal   Patient understands the importance of watching her diet, eliminating concentrated sweets and simple carbohydrates from her diet  She will be working on her exercise program in the near future       Diagnoses and all orders for this visit:    Encounter for immunization  -     PNEUMOCOCCAL POLYSACCHARIDE VACCINE 23-VALENT =>3YO SQ IM    Pure hypercholesterolemia  -     Discontinue: atorvastatin (LIPITOR) 10 mg tablet; Take 1 tablet (10 mg total) by mouth daily  -     atorvastatin (LIPITOR) 10 mg tablet; Take 1 tablet (10 mg total) by mouth daily    Age-related osteoporosis without current pathological fracture    Essential hypertension  -     Basic metabolic panel; Future    Hyperglycemia  -     Basic metabolic panel; Future  -     Hemoglobin A1C; Future    Anxiety          Subjective:      Patient ID: Luciano Verma is a 80 y o  female  Patient is a delightful 59-year-old female with history of multiple medical problems as outlined previously    Patient is here today for routine follow-up  Patient states that she has been doing well although she was seen in the emergency room approximately 1 month ago for acute pain in her right foot  Patient did have full workup and evaluation and no specific abnormalities were found  She states the pain has now subsided  She did have labs performed prior the visit today we did discuss the results showing good control of her sugar, slight hyponatremia  The following portions of the patient's history were reviewed and updated as appropriate:   She  has a past medical history of Anxiety; Diabetes mellitus ([facility] ); Diabetes mellitus with microalbuminuria ([facility] [address] ) (09/28/2017); GERD (gastroesophageal reflux disease); Hyperlipidemia; Hypertension; and Osteoarthritis  She   Patient Active Problem List    Diagnosis Date Noted    Osteoporosis 08/14/2014    Hyperglycemia 07/25/2013    Hypertension 08/16/2012    Hyperlipidemia 08/16/2012    Anxiety 08/16/2012     She  has a past surgical history that includes Cataract extraction  Her family history includes Heart disease in her mother; Heart failure in her mother; Hypertension in her brother and father  She  reports that she has quit smoking  She has never used smokeless tobacco  She reports that she does not drink alcohol or use drugs    Current Outpatient Prescriptions   Medication Sig Dispense Refill    aspirin 81 mg chewable tablet Chew Twice daily      Calcium Carbonate-Vitamin D (CALCIUM 600+D) 600-200 MG-UNIT TABS Take 1 tablet by mouth daily      citalopram (CeleXA) 40 mg tablet Take 1 tablet by mouth daily      losartan-hydrochlorothiazide (HYZAAR) 100-25 MG per tablet Take 1 tablet by mouth daily      potassium chloride (K-DUR,KLOR-CON) 10 mEq tablet Take 1 tablet by mouth daily      ranitidine (ZANTAC) 150 mg tablet Take 1 tablet by mouth every 12 (twelve) hours as needed      atorvastatin (LIPITOR) 10 mg tablet Take 1 tablet (10 mg total) by mouth daily 90 tablet 3     No current facility-administered medications for this visit  Current Outpatient Prescriptions on File Prior to Visit   Medication Sig    aspirin 81 mg chewable tablet Chew Twice daily    Calcium Carbonate-Vitamin D (CALCIUM 600+D) 600-200 MG-UNIT TABS Take 1 tablet by mouth daily    citalopram (CeleXA) 40 mg tablet Take 1 tablet by mouth daily    losartan-hydrochlorothiazide (HYZAAR) 100-25 MG per tablet Take 1 tablet by mouth daily    potassium chloride (K-DUR,KLOR-CON) 10 mEq tablet Take 1 tablet by mouth daily    ranitidine (ZANTAC) 150 mg tablet Take 1 tablet by mouth every 12 (twelve) hours as needed    [DISCONTINUED] atorvastatin (LIPITOR) 10 mg tablet Take 1 tablet (10 mg total) by mouth daily     No current facility-administered medications on file prior to visit  She is allergic to ampicillin; sulfa antibiotics; and sulfamethoxazole-trimethoprim       Review of Systems   Constitutional: Positive for activity change (Patient states that during the winter months she is not as physically active)  Negative for appetite change, chills, diaphoresis, fatigue, fever and unexpected weight change  HENT: Negative  Eyes: Negative  Respiratory: Negative  Cardiovascular: Positive for leg swelling ( chronic edema to lower extremities but no increase)  Negative for chest pain and palpitations  Gastrointestinal: Negative  Endocrine: Negative  Genitourinary: Negative  Musculoskeletal: Positive for arthralgias  Negative for back pain, gait problem, joint swelling, myalgias, neck pain and neck stiffness  Skin: Negative  Allergic/Immunologic: Negative  Neurological: Negative  Hematological: Negative  Psychiatric/Behavioral: Negative            Objective:      /64   Pulse 78   Temp 98 4 °F (36 9 °C)   Ht 5' 6" (1 676 m)   Wt 84 4 kg (186 lb)   LMP  (LMP Unknown)   SpO2 98%   BMI 30 02 kg/m²          Physical Exam   Constitutional: She is oriented to person, place, and time  She appears well-developed and well-nourished  No distress  Very pleasant, overweight 59-year-old female who is awake alert no acute distress and oriented x3   HENT:   Head: Normocephalic and atraumatic  Right Ear: External ear normal    Left Ear: External ear normal    Nose: Nose normal    Mouth/Throat: Oropharynx is clear and moist  No oropharyngeal exudate  Eyes: Pupils are equal, round, and reactive to light  Conjunctivae and EOM are normal  Right eye exhibits no discharge  Left eye exhibits no discharge  No scleral icterus  Neck: No JVD present  No tracheal deviation present  No thyromegaly present  Thick neck with some decreased range of motion both passively and actively but no pain with movement   Cardiovascular: Normal rate, regular rhythm, normal heart sounds and intact distal pulses  Exam reveals no gallop and no friction rub  No murmur heard  Pulmonary/Chest: Effort normal and breath sounds normal  No stridor  No respiratory distress  She has no wheezes  She has no rales  She exhibits no tenderness  Abdominal: Soft  Bowel sounds are normal  She exhibits no distension and no mass  There is no tenderness  There is no rebound and no guarding  Obese   Musculoskeletal: She exhibits edema and deformity  She exhibits no tenderness  Some mild arthritic changes to the hands and digits, +1 edema bilateral lower extremities secondary to venous insufficiency, no acute changes   Lymphadenopathy:     She has no cervical adenopathy  Neurological: She is oriented to person, place, and time  She has normal reflexes  She displays normal reflexes  No cranial nerve deficit  She exhibits normal muscle tone  Coordination normal    Skin: Skin is warm and dry  No rash noted  No erythema  No pallor  Psychiatric: She has a normal mood and affect  Her behavior is normal  Judgment and thought content normal    Nursing note and vitals reviewed  BMI Counseling:  Body mass index is 30 02 kg/m²  Discussed the patient's BMI with her  The BMI is above average  BMI counseling and education was provided to the patient  Nutrition recommendations include reducing portion sizes, decreasing overall calorie intake, 3-5 servings of fruits/vegetables daily, moderation in carbohydrate intake, increasing intake of lean protein, reducing intake of saturated fat and trans fat and reducing intake of cholesterol  Exercise recommendations include moderate aerobic physical activity for 150 minutes/week

## 2019-03-22 ENCOUNTER — OFFICE VISIT (OUTPATIENT)
Dept: INTERNAL MEDICINE CLINIC | Facility: CLINIC | Age: 84
End: 2019-03-22
Payer: MEDICARE

## 2019-03-22 VITALS
OXYGEN SATURATION: 98 % | HEIGHT: 66 IN | TEMPERATURE: 98.1 F | SYSTOLIC BLOOD PRESSURE: 142 MMHG | HEART RATE: 83 BPM | BODY MASS INDEX: 29.25 KG/M2 | WEIGHT: 182 LBS | DIASTOLIC BLOOD PRESSURE: 84 MMHG

## 2019-03-22 DIAGNOSIS — R73.9 HYPERGLYCEMIA: ICD-10-CM

## 2019-03-22 DIAGNOSIS — N30.00 ACUTE CYSTITIS WITHOUT HEMATURIA: Primary | ICD-10-CM

## 2019-03-22 DIAGNOSIS — R39.11 URINARY HESITANCY: ICD-10-CM

## 2019-03-22 DIAGNOSIS — I10 ESSENTIAL HYPERTENSION: ICD-10-CM

## 2019-03-22 LAB
BACTERIA UR QL AUTO: ABNORMAL /HPF
BILIRUB UR QL STRIP: NEGATIVE
CLARITY UR: ABNORMAL
COLOR UR: YELLOW
GLUCOSE UR STRIP-MCNC: NEGATIVE MG/DL
HGB UR QL STRIP.AUTO: NEGATIVE
HYALINE CASTS #/AREA URNS LPF: ABNORMAL /LPF
KETONES UR STRIP-MCNC: NEGATIVE MG/DL
LEUKOCYTE ESTERASE UR QL STRIP: ABNORMAL
NITRITE UR QL STRIP: NEGATIVE
NON-SQ EPI CELLS URNS QL MICRO: ABNORMAL /HPF
PH UR STRIP.AUTO: 6.5 [PH]
PROT UR STRIP-MCNC: NEGATIVE MG/DL
RBC #/AREA URNS AUTO: ABNORMAL /HPF
SL AMB  POCT GLUCOSE, UA: ABNORMAL
SL AMB LEUKOCYTE ESTERASE,UA: ABNORMAL
SL AMB POCT BILIRUBIN,UA: ABNORMAL
SL AMB POCT BLOOD,UA: ABNORMAL
SL AMB POCT CLARITY,UA: ABNORMAL
SL AMB POCT COLOR,UA: YELLOW
SL AMB POCT KETONES,UA: ABNORMAL
SL AMB POCT NITRITE,UA: ABNORMAL
SL AMB POCT PH,UA: 5
SL AMB POCT SPECIFIC GRAVITY,UA: 1.01
SL AMB POCT URINE PROTEIN: ABNORMAL
SL AMB POCT UROBILINOGEN: ABNORMAL
SP GR UR STRIP.AUTO: 1.01 (ref 1–1.03)
UROBILINOGEN UR QL STRIP.AUTO: 0.2 E.U./DL
WBC #/AREA URNS AUTO: ABNORMAL /HPF

## 2019-03-22 PROCEDURE — 87077 CULTURE AEROBIC IDENTIFY: CPT | Performed by: INTERNAL MEDICINE

## 2019-03-22 PROCEDURE — 81002 URINALYSIS NONAUTO W/O SCOPE: CPT | Performed by: INTERNAL MEDICINE

## 2019-03-22 PROCEDURE — 81001 URINALYSIS AUTO W/SCOPE: CPT | Performed by: INTERNAL MEDICINE

## 2019-03-22 PROCEDURE — 99214 OFFICE O/P EST MOD 30 MIN: CPT | Performed by: INTERNAL MEDICINE

## 2019-03-22 PROCEDURE — 87086 URINE CULTURE/COLONY COUNT: CPT | Performed by: INTERNAL MEDICINE

## 2019-03-22 RX ORDER — NITROFURANTOIN 25; 75 MG/1; MG/1
100 CAPSULE ORAL 2 TIMES DAILY
Qty: 10 CAPSULE | Refills: 0 | Status: SHIPPED | OUTPATIENT
Start: 2019-03-22 | End: 2019-03-27

## 2019-03-22 NOTE — PROGRESS NOTES
Assessment/Plan:    Urinary hesitancy  Patient is here today complaining of problems with urinary hesitancy, decreased urination  She states this is been going on for approximately 1 week  She does try to keep herself well hydrated  She is has no abdominal pain or cramping, dysuria, back pain related to this  She further denies any fever or chills  On evaluation of her urine it does show large amount of leukocytes, trace red blood cells  Presumptive diagnosis this point time is possible urinary tract infection  Patient's urine will be sent for culture and she was placed on Macrobid 1 pill twice a day for 5 days  She is to call if not improving  If patient still is symptomatic would consider ultrasound of the kidneys ureter bladder    Hypertension  Blood pressure is mildly elevated today but patient is very anxious about her medical condition  At this point we will continue present medication and check this again with her next visit in the next few months  Hyperglycemia  Patient is very proud of the fact that she has been on a new diet and is losing weight  She is down approximately 9 lb since October of last year  Patient was told to continue with present diet, and hopefully with the warmer weather she will get more regular exercise  We will check her weight with her next upcoming visit  Diagnoses and all orders for this visit:    Acute cystitis without hematuria  -     nitrofurantoin (MACROBID) 100 mg capsule; Take 1 capsule (100 mg total) by mouth 2 (two) times a day for 5 days  -     POCT urine dip  -     UA w Reflex to Microscopic w Reflex to Culture - Clinic Collect    Urinary hesitancy    Essential hypertension    Hyperglycemia          Subjective:      Patient ID: Edil Casey is a 80 y o  female  Patient is an 66-year-old female with a history of multiple medical problems as outlined previously    Patient is here today complaining of problems with urinary hesitancy, decreased urination throughout the day  She denies any burning with urination and no fever or chills  She denies any back pain or abdominal pains or cramps  The following portions of the patient's history were reviewed and updated as appropriate:   She  has a past medical history of Anxiety, Diabetes mellitus ([facility] [address] ), Diabetes mellitus with microalbuminuria ([facility] [address] ) (09/28/2017), GERD (gastroesophageal reflux disease), Hyperlipidemia, Hypertension, and Osteoarthritis  She   Patient Active Problem List    Diagnosis Date Noted    Urinary hesitancy 03/22/2019    Class 1 obesity due to excess calories in adult 02/04/2019    Osteoporosis 08/14/2014    Hyperglycemia 07/25/2013    Hypertension 08/16/2012    Hyperlipidemia 08/16/2012    Anxiety 08/16/2012     She  has a past surgical history that includes Cataract extraction  Her family history includes Heart disease in her mother; Heart failure in her mother; Hypertension in her brother and father  She  reports that she has quit smoking  She has never used smokeless tobacco  She reports that she does not drink alcohol or use drugs  Current Outpatient Medications   Medication Sig Dispense Refill    aspirin 81 mg chewable tablet Chew Twice daily      atorvastatin (LIPITOR) 10 mg tablet Take 1 tablet (10 mg total) by mouth daily 90 tablet 3    Calcium Carbonate-Vitamin D (CALCIUM 600+D) 600-200 MG-UNIT TABS Take 1 tablet by mouth daily      citalopram (CeleXA) 40 mg tablet Take 1 tablet by mouth daily      losartan-hydrochlorothiazide (HYZAAR) 100-25 MG per tablet Take 1 tablet by mouth daily      potassium chloride (K-DUR,KLOR-CON) 10 mEq tablet Take 1 tablet by mouth daily      ranitidine (ZANTAC) 150 mg tablet Take 1 tablet by mouth every 12 (twelve) hours as needed      nitrofurantoin (MACROBID) 100 mg capsule Take 1 capsule (100 mg total) by mouth 2 (two) times a day for 5 days 10 capsule 0     No current facility-administered medications for this visit  Current Outpatient Medications on File Prior to Visit   Medication Sig    aspirin 81 mg chewable tablet Chew Twice daily    atorvastatin (LIPITOR) 10 mg tablet Take 1 tablet (10 mg total) by mouth daily    Calcium Carbonate-Vitamin D (CALCIUM 600+D) 600-200 MG-UNIT TABS Take 1 tablet by mouth daily    citalopram (CeleXA) 40 mg tablet Take 1 tablet by mouth daily    losartan-hydrochlorothiazide (HYZAAR) 100-25 MG per tablet Take 1 tablet by mouth daily    potassium chloride (K-DUR,KLOR-CON) 10 mEq tablet Take 1 tablet by mouth daily    ranitidine (ZANTAC) 150 mg tablet Take 1 tablet by mouth every 12 (twelve) hours as needed     No current facility-administered medications on file prior to visit  She is allergic to ampicillin; sulfa antibiotics; and sulfamethoxazole-trimethoprim       Review of Systems   Constitutional: Positive for activity change (Patient admits that during the winter months she has been less physically active)  Negative for appetite change, chills, diaphoresis, fatigue, fever and unexpected weight change  HENT: Negative  Eyes: Negative  Respiratory: Negative  Cardiovascular: Positive for leg swelling ( chronic edema to lower extremities without change)  Negative for chest pain and palpitations  Gastrointestinal: Negative  Endocrine: Negative  Genitourinary: Positive for decreased urine volume and difficulty urinating  Negative for dyspareunia, dysuria, enuresis, flank pain, frequency, genital sores, hematuria, menstrual problem, pelvic pain, urgency, vaginal bleeding, vaginal discharge and vaginal pain  Musculoskeletal: Positive for back pain ( chronic back pain which is unchanged) and myalgias ( chronic myalgias which she states is improved since she has been taking the potassium)  Negative for arthralgias, gait problem, joint swelling, neck pain and neck stiffness  Skin: Negative  Allergic/Immunologic: Negative  Neurological: Negative  Hematological: Negative  Psychiatric/Behavioral: Negative for agitation, behavioral problems, confusion, decreased concentration, dysphoric mood, hallucinations, self-injury, sleep disturbance and suicidal ideas  The patient is nervous/anxious ( longstanding history of anxiety disorder and she states with her present medical condition and this makes this worse  )  The patient is not hyperactive  Objective:      /84   Pulse 83   Temp 98 1 °F (36 7 °C)   Ht 5' 6" (1 676 m)   Wt 82 6 kg (182 lb)   LMP  (LMP Unknown)   SpO2 98%   BMI 29 38 kg/m²          Physical Exam   Constitutional: She is oriented to person, place, and time  She appears well-developed and well-nourished  No distress  Pleasant, mildly anxious, overweight 80-year-old female who is awake alert in no acute distress and oriented x3   HENT:   Head: Normocephalic and atraumatic  Right Ear: External ear normal    Left Ear: External ear normal    Nose: Nose normal    Mouth/Throat: No oropharyngeal exudate  Slightly dry but pink oral mucosa   Eyes: Pupils are equal, round, and reactive to light  Conjunctivae and EOM are normal  Right eye exhibits no discharge  Left eye exhibits no discharge  No scleral icterus  Neck: Normal range of motion  Neck supple  No JVD present  No tracheal deviation present  No thyromegaly present  Cardiovascular: Normal rate, regular rhythm, normal heart sounds and intact distal pulses  Exam reveals no gallop and no friction rub  No murmur heard  Pulmonary/Chest: Effort normal and breath sounds normal  No stridor  No respiratory distress  She has no wheezes  She has no rales  She exhibits no tenderness  Abdominal: Soft  Bowel sounds are normal  She exhibits no distension and no mass  There is no tenderness  There is no rebound and no guarding  No hernia  Patient's abdomen is obese soft nontender    Patient has no CVA tenderness to percussion no abdominal pain on palpation   Musculoskeletal: Normal range of motion  She exhibits edema and deformity  Neurological: She is alert and oriented to person, place, and time  She displays normal reflexes  No cranial nerve deficit or sensory deficit  She exhibits normal muscle tone  Coordination normal    Skin: Skin is warm and dry  No rash noted  She is not diaphoretic  No erythema  No pallor  Psychiatric: Her behavior is normal  Judgment and thought content normal    Mildly anxious mood and affect which improved once the patient was told that she most likely has a urinary tract infection   Nursing note and vitals reviewed

## 2019-03-24 LAB — BACTERIA UR CULT: ABNORMAL

## 2019-03-25 ENCOUNTER — TELEPHONE (OUTPATIENT)
Dept: INTERNAL MEDICINE CLINIC | Facility: CLINIC | Age: 84
End: 2019-03-25

## 2019-03-27 ENCOUNTER — HOSPITAL ENCOUNTER (OUTPATIENT)
Dept: RADIOLOGY | Age: 84
Discharge: HOME | End: 2019-03-27
Payer: MEDICARE

## 2019-03-27 DIAGNOSIS — M81.0 AGE-RELATED OSTEOPOROSIS WITHOUT CURRENT PATHOLOGICAL FRACTURE: ICD-10-CM

## 2019-03-27 PROCEDURE — 77080 DXA BONE DENSITY AXIAL: CPT

## 2019-05-28 ENCOUNTER — APPOINTMENT (OUTPATIENT)
Dept: LAB | Age: 84
End: 2019-05-28
Payer: MEDICARE

## 2019-05-28 ENCOUNTER — OFFICE VISIT (OUTPATIENT)
Dept: INTERNAL MEDICINE CLINIC | Facility: CLINIC | Age: 84
End: 2019-05-28
Payer: MEDICARE

## 2019-05-28 VITALS
WEIGHT: 186 LBS | SYSTOLIC BLOOD PRESSURE: 130 MMHG | DIASTOLIC BLOOD PRESSURE: 82 MMHG | TEMPERATURE: 98.5 F | HEIGHT: 66 IN | BODY MASS INDEX: 29.89 KG/M2 | OXYGEN SATURATION: 98 % | HEART RATE: 81 BPM

## 2019-05-28 DIAGNOSIS — I10 ESSENTIAL HYPERTENSION: ICD-10-CM

## 2019-05-28 DIAGNOSIS — R73.9 HYPERGLYCEMIA: ICD-10-CM

## 2019-05-28 DIAGNOSIS — S13.9XXA CERVICAL SPRAIN, INITIAL ENCOUNTER: Primary | ICD-10-CM

## 2019-05-28 LAB
ANION GAP SERPL CALCULATED.3IONS-SCNC: 10 MMOL/L (ref 4–13)
BUN SERPL-MCNC: 11 MG/DL (ref 5–25)
CALCIUM SERPL-MCNC: 8.8 MG/DL (ref 8.3–10.1)
CHLORIDE SERPL-SCNC: 103 MMOL/L (ref 100–108)
CO2 SERPL-SCNC: 27 MMOL/L (ref 21–32)
CREAT SERPL-MCNC: 0.81 MG/DL (ref 0.6–1.3)
EST. AVERAGE GLUCOSE BLD GHB EST-MCNC: 108 MG/DL
GFR SERPL CREATININE-BSD FRML MDRD: 67 ML/MIN/1.73SQ M
GLUCOSE P FAST SERPL-MCNC: 112 MG/DL (ref 65–99)
HBA1C MFR BLD: 5.4 % (ref 4.2–6.3)
POTASSIUM SERPL-SCNC: 4.1 MMOL/L (ref 3.5–5.3)
SODIUM SERPL-SCNC: 140 MMOL/L (ref 136–145)

## 2019-05-28 PROCEDURE — 36415 COLL VENOUS BLD VENIPUNCTURE: CPT

## 2019-05-28 PROCEDURE — 83036 HEMOGLOBIN GLYCOSYLATED A1C: CPT

## 2019-05-28 PROCEDURE — 99213 OFFICE O/P EST LOW 20 MIN: CPT | Performed by: INTERNAL MEDICINE

## 2019-05-28 PROCEDURE — 80048 BASIC METABOLIC PNL TOTAL CA: CPT

## 2019-06-05 ENCOUNTER — OFFICE VISIT (OUTPATIENT)
Dept: INTERNAL MEDICINE CLINIC | Facility: CLINIC | Age: 84
End: 2019-06-05
Payer: MEDICARE

## 2019-06-05 VITALS
HEIGHT: 66 IN | OXYGEN SATURATION: 96 % | HEART RATE: 82 BPM | DIASTOLIC BLOOD PRESSURE: 72 MMHG | BODY MASS INDEX: 29.73 KG/M2 | WEIGHT: 185 LBS | TEMPERATURE: 99.1 F | SYSTOLIC BLOOD PRESSURE: 128 MMHG

## 2019-06-05 DIAGNOSIS — S13.9XXA ACUTE CERVICAL SPRAIN, INITIAL ENCOUNTER: ICD-10-CM

## 2019-06-05 DIAGNOSIS — I10 ESSENTIAL HYPERTENSION: ICD-10-CM

## 2019-06-05 DIAGNOSIS — E78.01 FAMILIAL HYPERCHOLESTEROLEMIA: ICD-10-CM

## 2019-06-05 DIAGNOSIS — R73.9 HYPERGLYCEMIA: ICD-10-CM

## 2019-06-05 DIAGNOSIS — M81.0 AGE-RELATED OSTEOPOROSIS WITHOUT CURRENT PATHOLOGICAL FRACTURE: Primary | ICD-10-CM

## 2019-06-05 PROCEDURE — 99214 OFFICE O/P EST MOD 30 MIN: CPT | Performed by: INTERNAL MEDICINE

## 2019-07-18 DIAGNOSIS — I10 ESSENTIAL HYPERTENSION: Primary | ICD-10-CM

## 2019-07-18 RX ORDER — POTASSIUM CHLORIDE 750 MG/1
10 TABLET, EXTENDED RELEASE ORAL DAILY
Qty: 90 TABLET | Refills: 1 | Status: SHIPPED | OUTPATIENT
Start: 2019-07-18 | End: 2019-10-24 | Stop reason: SDUPTHER

## 2019-10-21 ENCOUNTER — APPOINTMENT (OUTPATIENT)
Dept: LAB | Age: 84
End: 2019-10-21
Payer: MEDICARE

## 2019-10-21 DIAGNOSIS — R73.9 HYPERGLYCEMIA: ICD-10-CM

## 2019-10-21 DIAGNOSIS — E78.01 FAMILIAL HYPERCHOLESTEROLEMIA: ICD-10-CM

## 2019-10-21 DIAGNOSIS — I10 ESSENTIAL HYPERTENSION: ICD-10-CM

## 2019-10-21 LAB
ALBUMIN SERPL BCP-MCNC: 3.7 G/DL (ref 3.5–5)
ALP SERPL-CCNC: 61 U/L (ref 46–116)
ALT SERPL W P-5'-P-CCNC: 11 U/L (ref 12–78)
ANION GAP SERPL CALCULATED.3IONS-SCNC: 6 MMOL/L (ref 4–13)
AST SERPL W P-5'-P-CCNC: 13 U/L (ref 5–45)
BASOPHILS # BLD AUTO: 0.03 THOUSANDS/ΜL (ref 0–0.1)
BASOPHILS NFR BLD AUTO: 1 % (ref 0–1)
BILIRUB SERPL-MCNC: 0.72 MG/DL (ref 0.2–1)
BUN SERPL-MCNC: 16 MG/DL (ref 5–25)
CALCIUM SERPL-MCNC: 8.8 MG/DL (ref 8.3–10.1)
CHLORIDE SERPL-SCNC: 101 MMOL/L (ref 100–108)
CHOLEST SERPL-MCNC: 136 MG/DL (ref 50–200)
CO2 SERPL-SCNC: 28 MMOL/L (ref 21–32)
CREAT SERPL-MCNC: 0.83 MG/DL (ref 0.6–1.3)
CREAT UR-MCNC: 30.8 MG/DL
EOSINOPHIL # BLD AUTO: 0.06 THOUSAND/ΜL (ref 0–0.61)
EOSINOPHIL NFR BLD AUTO: 1 % (ref 0–6)
ERYTHROCYTE [DISTWIDTH] IN BLOOD BY AUTOMATED COUNT: 11.9 % (ref 11.6–15.1)
EST. AVERAGE GLUCOSE BLD GHB EST-MCNC: 103 MG/DL
GFR SERPL CREATININE-BSD FRML MDRD: 65 ML/MIN/1.73SQ M
GLUCOSE P FAST SERPL-MCNC: 97 MG/DL (ref 65–99)
HBA1C MFR BLD: 5.2 % (ref 4.2–6.3)
HCT VFR BLD AUTO: 35.6 % (ref 34.8–46.1)
HDLC SERPL-MCNC: 60 MG/DL
HGB BLD-MCNC: 11.9 G/DL (ref 11.5–15.4)
IMM GRANULOCYTES # BLD AUTO: 0.02 THOUSAND/UL (ref 0–0.2)
IMM GRANULOCYTES NFR BLD AUTO: 0 % (ref 0–2)
LDLC SERPL CALC-MCNC: 63 MG/DL (ref 0–100)
LYMPHOCYTES # BLD AUTO: 1.66 THOUSANDS/ΜL (ref 0.6–4.47)
LYMPHOCYTES NFR BLD AUTO: 28 % (ref 14–44)
MCH RBC QN AUTO: 31.9 PG (ref 26.8–34.3)
MCHC RBC AUTO-ENTMCNC: 33.4 G/DL (ref 31.4–37.4)
MCV RBC AUTO: 95 FL (ref 82–98)
MICROALBUMIN UR-MCNC: <5 MG/L (ref 0–20)
MICROALBUMIN/CREAT 24H UR: <16 MG/G CREATININE (ref 0–30)
MONOCYTES # BLD AUTO: 0.46 THOUSAND/ΜL (ref 0.17–1.22)
MONOCYTES NFR BLD AUTO: 8 % (ref 4–12)
NEUTROPHILS # BLD AUTO: 3.65 THOUSANDS/ΜL (ref 1.85–7.62)
NEUTS SEG NFR BLD AUTO: 62 % (ref 43–75)
NONHDLC SERPL-MCNC: 76 MG/DL
NRBC BLD AUTO-RTO: 0 /100 WBCS
PLATELET # BLD AUTO: 163 THOUSANDS/UL (ref 149–390)
PMV BLD AUTO: 10.3 FL (ref 8.9–12.7)
POTASSIUM SERPL-SCNC: 3.4 MMOL/L (ref 3.5–5.3)
PROT SERPL-MCNC: 6.8 G/DL (ref 6.4–8.2)
RBC # BLD AUTO: 3.73 MILLION/UL (ref 3.81–5.12)
SODIUM SERPL-SCNC: 135 MMOL/L (ref 136–145)
TRIGL SERPL-MCNC: 66 MG/DL
WBC # BLD AUTO: 5.88 THOUSAND/UL (ref 4.31–10.16)

## 2019-10-21 PROCEDURE — 80053 COMPREHEN METABOLIC PANEL: CPT

## 2019-10-21 PROCEDURE — 83036 HEMOGLOBIN GLYCOSYLATED A1C: CPT

## 2019-10-21 PROCEDURE — 36415 COLL VENOUS BLD VENIPUNCTURE: CPT

## 2019-10-21 PROCEDURE — 80061 LIPID PANEL: CPT

## 2019-10-21 PROCEDURE — 82570 ASSAY OF URINE CREATININE: CPT | Performed by: INTERNAL MEDICINE

## 2019-10-21 PROCEDURE — 85025 COMPLETE CBC W/AUTO DIFF WBC: CPT

## 2019-10-21 PROCEDURE — 82043 UR ALBUMIN QUANTITATIVE: CPT | Performed by: INTERNAL MEDICINE

## 2019-10-24 ENCOUNTER — OFFICE VISIT (OUTPATIENT)
Dept: INTERNAL MEDICINE CLINIC | Facility: CLINIC | Age: 84
End: 2019-10-24
Payer: MEDICARE

## 2019-10-24 VITALS
WEIGHT: 183.6 LBS | SYSTOLIC BLOOD PRESSURE: 130 MMHG | OXYGEN SATURATION: 98 % | DIASTOLIC BLOOD PRESSURE: 70 MMHG | HEIGHT: 66 IN | BODY MASS INDEX: 29.51 KG/M2 | TEMPERATURE: 98.8 F | HEART RATE: 81 BPM

## 2019-10-24 DIAGNOSIS — Z00.00 MEDICARE ANNUAL WELLNESS VISIT, SUBSEQUENT: ICD-10-CM

## 2019-10-24 DIAGNOSIS — M81.0 AGE-RELATED OSTEOPOROSIS WITHOUT CURRENT PATHOLOGICAL FRACTURE: ICD-10-CM

## 2019-10-24 DIAGNOSIS — E78.00 PURE HYPERCHOLESTEROLEMIA: ICD-10-CM

## 2019-10-24 DIAGNOSIS — Z23 ENCOUNTER FOR IMMUNIZATION: Primary | ICD-10-CM

## 2019-10-24 DIAGNOSIS — F41.9 ANXIETY: ICD-10-CM

## 2019-10-24 DIAGNOSIS — R73.9 HYPERGLYCEMIA: ICD-10-CM

## 2019-10-24 DIAGNOSIS — K21.00 GASTROESOPHAGEAL REFLUX DISEASE WITH ESOPHAGITIS: ICD-10-CM

## 2019-10-24 DIAGNOSIS — I10 ESSENTIAL HYPERTENSION: ICD-10-CM

## 2019-10-24 PROCEDURE — G0008 ADMIN INFLUENZA VIRUS VAC: HCPCS

## 2019-10-24 PROCEDURE — G0439 PPPS, SUBSEQ VISIT: HCPCS | Performed by: INTERNAL MEDICINE

## 2019-10-24 PROCEDURE — 99214 OFFICE O/P EST MOD 30 MIN: CPT | Performed by: INTERNAL MEDICINE

## 2019-10-24 PROCEDURE — 90662 IIV NO PRSV INCREASED AG IM: CPT

## 2019-10-24 RX ORDER — POTASSIUM CHLORIDE 750 MG/1
10 TABLET, EXTENDED RELEASE ORAL DAILY
Qty: 90 TABLET | Refills: 1 | Status: SHIPPED | OUTPATIENT
Start: 2019-10-24 | End: 2020-04-07 | Stop reason: SDUPTHER

## 2019-10-24 RX ORDER — CITALOPRAM 40 MG/1
40 TABLET ORAL DAILY
Qty: 90 TABLET | Refills: 1 | Status: SHIPPED | OUTPATIENT
Start: 2019-10-24 | End: 2020-04-07 | Stop reason: SDUPTHER

## 2019-10-24 RX ORDER — ATORVASTATIN CALCIUM 10 MG/1
10 TABLET, FILM COATED ORAL DAILY
Qty: 90 TABLET | Refills: 3 | Status: SHIPPED | OUTPATIENT
Start: 2019-10-24 | End: 2020-04-07 | Stop reason: SDUPTHER

## 2019-10-24 RX ORDER — RANITIDINE 150 MG/1
150 TABLET ORAL 2 TIMES DAILY
Qty: 90 TABLET | Refills: 1 | Status: SHIPPED | OUTPATIENT
Start: 2019-10-24 | End: 2020-04-07 | Stop reason: SDUPTHER

## 2019-10-24 RX ORDER — LOSARTAN POTASSIUM AND HYDROCHLOROTHIAZIDE 25; 100 MG/1; MG/1
1 TABLET ORAL DAILY
Qty: 90 TABLET | Refills: 1 | Status: SHIPPED | OUTPATIENT
Start: 2019-10-24 | End: 2020-04-07 | Stop reason: SDUPTHER

## 2019-10-24 NOTE — PROGRESS NOTES
Assessment and Plan:     Problem List Items Addressed This Visit        Cardiovascular and Mediastinum    Hypertension    Relevant Medications    losartan-hydrochlorothiazide (HYZAAR) 100-25 MG per tablet    potassium chloride (K-DUR,KLOR-CON) 10 mEq tablet       Musculoskeletal and Integument    Age-related osteoporosis without current pathological fracture       Other    Hyperlipidemia    Relevant Medications    atorvastatin (LIPITOR) 10 mg tablet    Hyperglycemia    Anxiety    Relevant Medications    citalopram (CeleXA) 40 mg tablet      Other Visit Diagnoses     Encounter for immunization    -  Primary    Relevant Orders    influenza vaccine, [id number], high-dose, PF 0 5 mL (FLUZONE HIGH-DOSE)    Gastroesophageal reflux disease with esophagitis        Relevant Medications    ranitidine (ZANTAC) 150 mg tablet           Preventive health issues were discussed with patient, and age appropriate screening tests were ordered as noted in patient's After Visit Summary  Personalized health advice and appropriate referrals for health education or preventive services given if needed, as noted in patient's After Visit Summary       History of Present Illness:     Patient presents for Medicare Annual Wellness visit    Patient Care Team:  Kavin Goldsmith DO as PCP - General     Problem List:     Patient Active Problem List   Diagnosis    Hypertension    Hyperlipidemia    Hyperglycemia    Osteoporosis    Anxiety    Class 1 obesity due to excess calories in adult    Urinary hesitancy    Acute cervical sprain, initial encounter    Age-related osteoporosis without current pathological fracture      Past Medical and Surgical History:     Past Medical History:   Diagnosis Date    Anxiety     Diabetes mellitus ([address] )     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    or microproteinuria    GERD (gastroesophageal reflux disease)     Hyperlipidemia     Hypertension     Osteoarthritis      Past Surgical History:   Procedure Laterality Date    CATARACT EXTRACTION        Family History:     Family History   Problem Relation Age of Onset    Heart failure Mother     Heart disease Mother     Hypertension Father     Hypertension Brother       Social History:     Social History     Socioeconomic History    Marital status:       Spouse name: None    Number of children: None    Years of education: None    Highest education level: None   Occupational History    None   Social Needs    Financial resource strain: None    Food insecurity:     Worry: None     Inability: None    Transportation needs:     Medical: None     Non-medical: None   Tobacco Use    Smoking status: Former Smoker    Smokeless tobacco: Never Used   Substance and Sexual Activity    Alcohol use: No    Drug use: No    Sexual activity: None   Lifestyle    Physical activity:     Days per week: None     Minutes per session: None    Stress: None   Relationships    Social connections:     Talks on phone: None     Gets together: None     Attends Mandaen service: None     Active member of club or organization: None     Attends meetings of clubs or organizations: None     Relationship status: None    Intimate partner violence:     Fear of current or ex partner: None     Emotionally abused: None     Physically abused: None     Forced sexual activity: None   Other Topics Concern    None   Social History Narrative    Daily Coffee Consumption (4 Cups/Day)       Medications and Allergies:     Current Outpatient Medications   Medication Sig Dispense Refill    aspirin 81 mg chewable tablet Chew Twice daily      atorvastatin (LIPITOR) 10 mg tablet Take 1 tablet (10 mg total) by mouth daily 90 tablet 3    Calcium Carbonate-Vitamin D (CALCIUM 600+D) 600-200 MG-UNIT TABS Take 1 tablet by mouth daily      citalopram (CeleXA) 40 mg tablet Take 1 tablet (40 mg total) by mouth daily 90 tablet 1    losartan-hydrochlorothiazide (HYZAAR) 100-25 MG per tablet Take 1 tablet by mouth daily 90 tablet 1    potassium chloride (K-DUR,KLOR-CON) 10 mEq tablet Take 1 tablet (10 mEq total) by mouth daily 90 tablet 1    ranitidine (ZANTAC) 150 mg tablet Take 1 tablet (150 mg total) by mouth 2 (two) times a day 90 tablet 1     No current facility-administered medications for this visit  Allergies   Allergen Reactions    Ampicillin      Other reaction(s): Unknown Allergic Reaction  Reaction Date: [id number];     Sulfa Antibiotics      Other reaction(s): Unknown Allergic Reaction    Sulfamethoxazole-Trimethoprim      Reaction Date: [id number];       Immunizations:     Immunization History   Administered Date(s) Administered    DTaP 5 05/14/2014    INFLUENZA 11/08/2015    Influenza Quadrivalent, 6-35 Months IM 09/28/2017    Influenza Split High Dose Preservative Free IM 09/23/2013, 09/11/2014, 09/08/2016    Influenza TIV (IM) 10/01/2010    Influenza, high dose seasonal 0 5 mL 10/01/2018    Pneumococcal Conjugate 13-Valent 12/03/2014    Pneumococcal Polysaccharide PPV23 02/04/2019      Health Maintenance: There are no preventive care reminders to display for this patient  Topic Date Due    INFLUENZA VACCINE  07/01/2019      Medicare Health Risk Assessment:     /70   Pulse 81   Temp 98 8 °F (37 1 °C)   Ht 5' 6" (1 676 m)   Wt 83 3 kg (183 lb 9 6 oz)   LMP  (LMP Unknown)   SpO2 98%   BMI 29 63 kg/m²      Ethel Lazo is here for her Subsequent Wellness visit  Health Risk Assessment:   Patient rates overall health as good  Patient feels that their physical health rating is much better  Eyesight was rated as same  Hearing was rated as same  Patient feels that their emotional and mental health rating is same  Pain experienced in the last 7 days has been none  Patient states that she has experienced no weight loss or gain in last 6 months  Depression Screening:   PHQ-2 Score: 0      Fall Risk Screening:    In the past year, patient has experienced: history of falling in past year    Number of falls: 1  Injured during fall?: No    Feels unsteady when standing or walking?: Yes    Worried about falling?: Yes      Urinary Incontinence Screening:   Patient has not leaked urine accidently in the last six months  Home Safety:  Patient does not have trouble with stairs inside or outside of their home  Patient has working smoke alarms and has working carbon monoxide detector  Home safety hazards include: none  Nutrition:   Current diet is Regular  Medications:   Patient is currently taking over-the-counter supplements  OTC medications include: see medication list  Patient is able to manage medications  Activities of Daily Living (ADLs)/Instrumental Activities of Daily Living (IADLs):   Walk and transfer into and out of bed and chair?: Yes  Dress and groom yourself?: Yes    Bathe or shower yourself?: Yes    Feed yourself? Yes  Do your laundry/housekeeping?: Yes  Manage your money, pay your bills and track your expenses?: Yes  Make your own meals?: Yes    Do your own shopping?: Yes    Previous Hospitalizations:   Any hospitalizations or ED visits within the last 12 months?: No      Advance Care Planning:   Living will: Yes    Durable POA for healthcare:  Yes    Advanced directive: Yes      PREVENTIVE SCREENINGS      Cardiovascular Screening:    General: Screening Not Indicated and History Lipid Disorder      Diabetes Screening:     General: Screening Current      Cervical Cancer Screening:    General: Screening Not Indicated      Osteoporosis Screening:    General: Screening Not Indicated and History Osteoporosis      Darshan Richey, DO

## 2019-10-24 NOTE — PROGRESS NOTES
Assessment/Plan:    Hyperlipidemia  Cholesterol is under control  She remains on atorvastatin 10 mg a day  She admits the fact that she is not always compliant with her diet and we did reinforce with the patient today the importance of decreasing her intake of fats and cholesterol with her diet  Hyperglycemia  Blood sugars controlled with no evidence of diabetes  We will continue to check her blood sugar readings, initiate treatment if necessary  She did states again that she is not watching her diet closely and we did reinforce the importance of watching her intake concentrated sweets and simple carbohydrates with her diet in order to help lose weight  She is limited in her exercise capacity because of her weight and arthritic conditions  Age-related osteoporosis without current pathological fracture  Patient does have a documented history of age-related osteoporosis  We are arranging for the patient to have her 1st Prolia injection today  She will continue to have these injections every 6 months  She is told to call if she has any residual side effects from the medication or treatment  Hypertension  Patient's blood pressure controlled  Renal function is stable  Patient will continue present medication and surveillance  In checking her electrolytes she is slightly low on her potassium level even though the patient takes potassium supplements and is eating a banana daily  Again we will continue to monitor this and make adjustments to treatment as necessary  Diagnoses and all orders for this visit:    Encounter for immunization  -     influenza vaccine, [id number], high-dose, PF 0 5 mL (FLUZONE HIGH-DOSE)    Pure hypercholesterolemia  -     atorvastatin (LIPITOR) 10 mg tablet; Take 1 tablet (10 mg total) by mouth daily    Essential hypertension  -     losartan-hydrochlorothiazide (HYZAAR) 100-25 MG per tablet;  Take 1 tablet by mouth daily  -     potassium chloride (K-DUR,KLOR-CON) 10 mEq tablet; Take 1 tablet (10 mEq total) by mouth daily    Anxiety  -     citalopram (CeleXA) 40 mg tablet; Take 1 tablet (40 mg total) by mouth daily    Gastroesophageal reflux disease with esophagitis  -     ranitidine (ZANTAC) 150 mg tablet; Take 1 tablet (150 mg total) by mouth 2 (two) times a day    Hyperglycemia    Age-related osteoporosis without current pathological fracture  -     denosumab (PROLIA) subcutaneous injection 60 mg    Medicare annual wellness visit, subsequent          Subjective:      Patient ID: Vinod Rubin is a 80 y o  female  Patient is an 31-year-old female with a history of multiple medical problems as outlined previously  Patient is here today for repeat Medicare wellness visit  She did have extensive lab testing performed prior to the visit today and we did discuss the results  Patient states in general she is doing about the same with no new complaints or problems  She states her biggest complaint at this point time is her chronic edema to lower extremities  She denies chest pain or pressure and no increasing shortness of breath with exertion  The following portions of the patient's history were reviewed and updated as appropriate: She  has a past medical history of Anxiety, Diabetes mellitus ([facility] [address] ), Diabetes mellitus with microalbuminuria ([facility] [address] ) (09/28/2017), GERD (gastroesophageal reflux disease), Hyperlipidemia, Hypertension, and Osteoarthritis    She   Patient Active Problem List    Diagnosis Date Noted    Medicare annual wellness visit, subsequent 10/24/2019    Age-related osteoporosis without current pathological fracture 06/05/2019    Acute cervical sprain, initial encounter 05/28/2019    Urinary hesitancy 03/22/2019    Class 1 obesity due to excess calories in adult 02/04/2019    Osteoporosis 08/14/2014    Hyperglycemia 07/25/2013    Hypertension 08/16/2012    Hyperlipidemia 08/16/2012    Anxiety 08/16/2012     She  has a past surgical history that includes Cataract extraction  Her family history includes Heart disease in her mother; Heart failure in her mother; Hypertension in her brother and father  She  reports that she has quit smoking  She has never used smokeless tobacco  She reports that she does not drink alcohol or use drugs    Current Outpatient Medications   Medication Sig Dispense Refill    aspirin 81 mg chewable tablet Chew Twice daily      atorvastatin (LIPITOR) 10 mg tablet Take 1 tablet (10 mg total) by mouth daily 90 tablet 3    Calcium Carbonate-Vitamin D (CALCIUM 600+D) 600-200 MG-UNIT TABS Take 1 tablet by mouth daily      citalopram (CeleXA) 40 mg tablet Take 1 tablet (40 mg total) by mouth daily 90 tablet 1    losartan-hydrochlorothiazide (HYZAAR) 100-25 MG per tablet Take 1 tablet by mouth daily 90 tablet 1    potassium chloride (K-DUR,KLOR-CON) 10 mEq tablet Take 1 tablet (10 mEq total) by mouth daily 90 tablet 1    ranitidine (ZANTAC) 150 mg tablet Take 1 tablet (150 mg total) by mouth 2 (two) times a day 90 tablet 1     Current Facility-Administered Medications   Medication Dose Route Frequency Provider Last Rate Last Dose    denosumab (PROLIA) subcutaneous injection 60 mg  60 mg Subcutaneous Once Bev Lye, DO         Current Outpatient Medications on File Prior to Visit   Medication Sig    aspirin 81 mg chewable tablet Chew Twice daily    Calcium Carbonate-Vitamin D (CALCIUM 600+D) 600-200 MG-UNIT TABS Take 1 tablet by mouth daily    [DISCONTINUED] atorvastatin (LIPITOR) 10 mg tablet Take 1 tablet (10 mg total) by mouth daily    [DISCONTINUED] citalopram (CeleXA) 40 mg tablet Take 1 tablet by mouth daily    [DISCONTINUED] losartan-hydrochlorothiazide (HYZAAR) 100-25 MG per tablet Take 1 tablet by mouth daily    [DISCONTINUED] potassium chloride (K-DUR,KLOR-CON) 10 mEq tablet Take 1 tablet (10 mEq total) by mouth daily    [DISCONTINUED] ranitidine (ZANTAC) 150 mg tablet Take 1 tablet by mouth every 12 (twelve) hours as needed     No current facility-administered medications on file prior to visit  She is allergic to ampicillin; sulfa antibiotics; and sulfamethoxazole-trimethoprim       Review of Systems   Constitutional: Negative  HENT: Negative  Eyes: Negative  Respiratory: Negative  Cardiovascular: Positive for leg swelling (Chronic edema to lower extremities which is unchanged  States does have extreme difficulty wearing support hose)  Negative for chest pain and palpitations  Gastrointestinal: Negative  Endocrine: Negative  Genitourinary: Negative  Musculoskeletal: Positive for arthralgias and back pain  Negative for gait problem, joint swelling, myalgias, neck pain and neck stiffness  Skin: Negative  Allergic/Immunologic: Negative  Neurological: Negative  Hematological: Negative  Psychiatric/Behavioral: Negative  Objective:      /70   Pulse 81   Temp 98 8 °F (37 1 °C)   Ht 5' 6" (1 676 m)   Wt 83 3 kg (183 lb 9 6 oz)   LMP  (LMP Unknown)   SpO2 98%   BMI 29 63 kg/m²          Physical Exam   Constitutional: She is oriented to person, place, and time  She appears well-developed and well-nourished  No distress  Pleasant, cheerful, overweight 26-year-old female who is awake alert no acute distress and oriented x3   HENT:   Head: Normocephalic and atraumatic  Right Ear: External ear normal    Left Ear: External ear normal    Nose: Nose normal    Mouth/Throat: Oropharynx is clear and moist  No oropharyngeal exudate  Eyes: Pupils are equal, round, and reactive to light  Conjunctivae and EOM are normal  Right eye exhibits no discharge  Left eye exhibits no discharge  No scleral icterus  Neck: Neck supple  No JVD present  No tracheal deviation present  No thyromegaly present  Patient still has some residual paravertebral muscle spasm to the cervical spine posteriorly  Some decreased range of motion both actively and passively but now no pain with movement  Cardiovascular: Normal rate, regular rhythm, normal heart sounds and intact distal pulses  Exam reveals no gallop and no friction rub  No murmur heard  Pulmonary/Chest: Effort normal and breath sounds normal  No stridor  No respiratory distress  She has no wheezes  She has no rales  She exhibits no tenderness  Abdominal: Soft  Bowel sounds are normal  She exhibits no distension and no mass  There is no tenderness  There is no rebound and no guarding  No hernia  Obese   Musculoskeletal: She exhibits edema (A +1 to 2 edema bilateral lower extremities which was chronic, no stasis changes) and deformity  She exhibits no tenderness  Abnormalities to the cervical spine as previously noted  Patient does have some diffuse arthritic changes to both hands and digits but nothing acute  Arthritic changes to both knees again no acute inflammation  Lymphadenopathy:     She has no cervical adenopathy  Neurological: She is alert and oriented to person, place, and time  She displays abnormal reflex ( absent patella and Achilles tendon reflexes bilaterally)  No cranial nerve deficit or sensory deficit  She exhibits normal muscle tone  Coordination normal    Skin: Skin is warm and dry  Capillary refill takes less than 2 seconds  No rash noted  She is not diaphoretic  No erythema  No pallor  Psychiatric: She has a normal mood and affect  Her behavior is normal  Judgment and thought content normal    Nursing note and vitals reviewed

## 2019-10-24 NOTE — ASSESSMENT & PLAN NOTE
Patient does have a documented history of age-related osteoporosis  We are arranging for the patient to have her 1st Prolia injection today  She will continue to have these injections every 6 months  She is told to call if she has any residual side effects from the medication or treatment

## 2019-10-24 NOTE — ASSESSMENT & PLAN NOTE
Patient's blood pressure controlled  Renal function is stable  Patient will continue present medication and surveillance  In checking her electrolytes she is slightly low on her potassium level even though the patient takes potassium supplements and is eating a banana daily  Again we will continue to monitor this and make adjustments to treatment as necessary

## 2019-10-24 NOTE — ASSESSMENT & PLAN NOTE
Blood sugars controlled with no evidence of diabetes  We will continue to check her blood sugar readings, initiate treatment if necessary  She did states again that she is not watching her diet closely and we did reinforce the importance of watching her intake concentrated sweets and simple carbohydrates with her diet in order to help lose weight  She is limited in her exercise capacity because of her weight and arthritic conditions

## 2019-10-24 NOTE — ASSESSMENT & PLAN NOTE
Cholesterol is under control  She remains on atorvastatin 10 mg a day  She admits the fact that she is not always compliant with her diet and we did reinforce with the patient today the importance of decreasing her intake of fats and cholesterol with her diet

## 2020-02-20 ENCOUNTER — OFFICE VISIT (OUTPATIENT)
Dept: INTERNAL MEDICINE CLINIC | Facility: CLINIC | Age: 85
End: 2020-02-20
Payer: MEDICARE

## 2020-02-20 VITALS
BODY MASS INDEX: 29.89 KG/M2 | WEIGHT: 186 LBS | RESPIRATION RATE: 14 BRPM | TEMPERATURE: 98.2 F | DIASTOLIC BLOOD PRESSURE: 76 MMHG | HEIGHT: 66 IN | HEART RATE: 85 BPM | OXYGEN SATURATION: 98 % | SYSTOLIC BLOOD PRESSURE: 138 MMHG

## 2020-02-20 DIAGNOSIS — E66.09 CLASS 1 OBESITY DUE TO EXCESS CALORIES WITHOUT SERIOUS COMORBIDITY WITH BODY MASS INDEX (BMI) OF 30.0 TO 30.9 IN ADULT: ICD-10-CM

## 2020-02-20 DIAGNOSIS — R73.9 HYPERGLYCEMIA: Primary | ICD-10-CM

## 2020-02-20 DIAGNOSIS — I10 ESSENTIAL HYPERTENSION: ICD-10-CM

## 2020-02-20 DIAGNOSIS — E78.01 FAMILIAL HYPERCHOLESTEROLEMIA: ICD-10-CM

## 2020-02-20 PROCEDURE — 1036F TOBACCO NON-USER: CPT | Performed by: INTERNAL MEDICINE

## 2020-02-20 PROCEDURE — 3008F BODY MASS INDEX DOCD: CPT | Performed by: INTERNAL MEDICINE

## 2020-02-20 PROCEDURE — 1160F RVW MEDS BY RX/DR IN RCRD: CPT | Performed by: INTERNAL MEDICINE

## 2020-02-20 PROCEDURE — 4040F PNEUMOC VAC/ADMIN/RCVD: CPT | Performed by: INTERNAL MEDICINE

## 2020-02-20 PROCEDURE — 99214 OFFICE O/P EST MOD 30 MIN: CPT | Performed by: INTERNAL MEDICINE

## 2020-02-20 PROCEDURE — 3075F SYST BP GE 130 - 139MM HG: CPT | Performed by: INTERNAL MEDICINE

## 2020-02-20 PROCEDURE — 3078F DIAST BP <80 MM HG: CPT | Performed by: INTERNAL MEDICINE

## 2020-02-20 NOTE — ASSESSMENT & PLAN NOTE
Patient again admits that she is not watching her calories as closely as she should  She there is been no significant weight loss since her last visit  Patient was told that she needs to watch her caloric intake especially in light of the fact that she does have some limitations with her exercise capacity

## 2020-02-20 NOTE — ASSESSMENT & PLAN NOTE
Patient remains on atorvastatin 10 mg daily  Again patient admits that she is not always perfect with her diet  We will check a lipid profile with her next visit  We did discuss the importance of diet again today with the patient  Watching her intake of fats and cholesterol with her diet decreasing her caloric intake not only to have her cholesterol under control but also her caloric intake

## 2020-02-20 NOTE — ASSESSMENT & PLAN NOTE
As noted patient's blood pressure is controlled  Patient will continue present medication and surveillance  We will also continue to monitor her renal function

## 2020-02-20 NOTE — PROGRESS NOTES
Assessment/Plan:    Hyperglycemia  As noted patient's sugars have shown good control, hemoglobin A1c is in the normal range  She admits that she is not always perfect with her diet  Patient will have a fasting blood sugar, hemoglobin A1c checked with her next visit  We will modify our initiate treatment if necessary    Hyperlipidemia  Patient remains on atorvastatin 10 mg daily  Again patient admits that she is not always perfect with her diet  We will check a lipid profile with her next visit  We did discuss the importance of diet again today with the patient  Watching her intake of fats and cholesterol with her diet decreasing her caloric intake not only to have her cholesterol under control but also her caloric intake  Class 1 obesity due to excess calories in adult  Patient again admits that she is not watching her calories as closely as she should  She there is been no significant weight loss since her last visit  Patient was told that she needs to watch her caloric intake especially in light of the fact that she does have some limitations with her exercise capacity  Hypertension  As noted patient's blood pressure is controlled  Patient will continue present medication and surveillance  We will also continue to monitor her renal function  Diagnoses and all orders for this visit:    Hyperglycemia  -     Basic metabolic panel; Future  -     Hemoglobin A1C; Future    Familial hypercholesterolemia  -     Lipid panel; Future    Essential hypertension  -     Basic metabolic panel; Future    Class 1 obesity due to excess calories without serious comorbidity with body mass index (BMI) of 30 0 to 30 9 in adult          Subjective:      Patient ID: French Crespo is a 80 y o  female  Patient is a an 58-year-old female with a history of hypertension, hyperlipidemia, hyperglycemia, osteoporosis, exogenous obesity  Patient is here today for routine follow-up after four-month  Of time    Patient states in general she is doing about the same with no new major complaints or concerns  She does relate that she did have a fall recently the 28th of December while walking with her family  She did fall to her knees  No severe injury  She states that she fell over a step and was prompted to fall by her daughter and son      The following portions of the patient's history were reviewed and updated as appropriate:   She  has a past medical history of Anxiety, Diabetes mellitus ([facility] [address] ), Diabetes mellitus with microalbuminuria ([facility] [address] ) (09/28/2017), GERD (gastroesophageal reflux disease), Hyperlipidemia, Hypertension, and Osteoarthritis  She   Patient Active Problem List    Diagnosis Date Noted    Medicare annual wellness visit, subsequent 10/24/2019    Age-related osteoporosis without current pathological fracture 06/05/2019    Acute cervical sprain, initial encounter 05/28/2019    Urinary hesitancy 03/22/2019    Class 1 obesity due to excess calories in adult 02/04/2019    Osteoporosis 08/14/2014    Hyperglycemia 07/25/2013    Hypertension 08/16/2012    Hyperlipidemia 08/16/2012    Anxiety 08/16/2012     She  has a past surgical history that includes Cataract extraction  Her family history includes Heart disease in her mother; Heart failure in her mother; Hypertension in her brother and father  She  reports that she has quit smoking  She has never used smokeless tobacco  She reports that she does not drink alcohol or use drugs    Current Outpatient Medications   Medication Sig Dispense Refill    aspirin 81 mg chewable tablet Chew Twice daily      atorvastatin (LIPITOR) 10 mg tablet Take 1 tablet (10 mg total) by mouth daily 90 tablet 3    Calcium Carbonate-Vitamin D (CALCIUM 600+D) 600-200 MG-UNIT TABS Take 1 tablet by mouth daily      citalopram (CeleXA) 40 mg tablet Take 1 tablet (40 mg total) by mouth daily 90 tablet 1    losartan-hydrochlorothiazide (HYZAAR) 100-25 MG per tablet Take 1 tablet by mouth daily 90 tablet 1    potassium chloride (K-DUR,KLOR-CON) 10 mEq tablet Take 1 tablet (10 mEq total) by mouth daily 90 tablet 1    ranitidine (ZANTAC) 150 mg tablet Take 1 tablet (150 mg total) by mouth 2 (two) times a day 90 tablet 1     No current facility-administered medications for this visit  Current Outpatient Medications on File Prior to Visit   Medication Sig    aspirin 81 mg chewable tablet Chew Twice daily    atorvastatin (LIPITOR) 10 mg tablet Take 1 tablet (10 mg total) by mouth daily    Calcium Carbonate-Vitamin D (CALCIUM 600+D) 600-200 MG-UNIT TABS Take 1 tablet by mouth daily    citalopram (CeleXA) 40 mg tablet Take 1 tablet (40 mg total) by mouth daily    losartan-hydrochlorothiazide (HYZAAR) 100-25 MG per tablet Take 1 tablet by mouth daily    potassium chloride (K-DUR,KLOR-CON) 10 mEq tablet Take 1 tablet (10 mEq total) by mouth daily    ranitidine (ZANTAC) 150 mg tablet Take 1 tablet (150 mg total) by mouth 2 (two) times a day     No current facility-administered medications on file prior to visit  She is allergic to ampicillin; sulfa antibiotics; and sulfamethoxazole-trimethoprim       Review of Systems   Constitutional: Negative  HENT: Negative  Eyes: Negative  Respiratory: Negative  Cardiovascular: Positive for leg swelling (Chronic edema bilateral lower extremities with no increase  Admits that she is not keeping her legs elevated but does wear support hose but not on a daily basis)  Negative for chest pain and palpitations  Gastrointestinal: Negative  Endocrine: Negative  Genitourinary: Negative  Musculoskeletal: Positive for arthralgias and back pain  Negative for gait problem, joint swelling, myalgias, neck pain and neck stiffness  Some occasional low back pain but not severe disabling, diffuse arthritic pain   Skin: Negative  Allergic/Immunologic: Negative  Neurological: Negative  Hematological: Negative  Psychiatric/Behavioral: Negative  Objective:      /76   Pulse 85   Temp 98 2 °F (36 8 °C)   Resp 14   Ht 5' 6" (1 676 m)   Wt 84 4 kg (186 lb)   LMP  (LMP Unknown)   SpO2 98%   BMI 30 02 kg/m²          Physical Exam   Constitutional: She is oriented to person, place, and time  She appears well-developed and well-nourished  No distress  Pleasant slightly anxious, overweight 20-year-old female who is awake alert no acute distress and lying did x3   HENT:   Head: Normocephalic and atraumatic  Right Ear: External ear normal    Left Ear: External ear normal    Nose: Nose normal    Mouth/Throat: Oropharynx is clear and moist  No oropharyngeal exudate  Eyes: Pupils are equal, round, and reactive to light  Conjunctivae and EOM are normal  Right eye exhibits no discharge  Left eye exhibits no discharge  No scleral icterus  Neck: Normal range of motion  Neck supple  No JVD present  No tracheal deviation present  No thyromegaly present  Cardiovascular: Normal rate, regular rhythm, normal heart sounds and intact distal pulses  Exam reveals no gallop and no friction rub  No murmur heard  Chronic +1 edema bilateral lower extremities without change  No stasis changes to the skin surfaces   Pulmonary/Chest: Effort normal and breath sounds normal  No stridor  No respiratory distress  She has no wheezes  She has no rales  She exhibits no tenderness  Abdominal: Soft  Bowel sounds are normal  She exhibits no distension and no mass  There is no tenderness  There is no rebound and no guarding  No hernia  Obese   Musculoskeletal: She exhibits edema and deformity  She exhibits no tenderness  Diffuse arthritic changes the hands and digits, flattening to her lumbar curve with decreased range of motion both passively and actively but no pain with movement  Patient has no acute orthopedic lesions on exam   Lymphadenopathy:     She has no cervical adenopathy     Neurological: She is alert and oriented to person, place, and time  She displays abnormal reflex (Absent patella and Achilles tendon reflexes bilaterally)  No cranial nerve deficit or sensory deficit  She exhibits normal muscle tone  Coordination normal    Skin: Skin is warm and dry  Capillary refill takes less than 2 seconds  No rash noted  She is not diaphoretic  No erythema  No pallor  Psychiatric: She has a normal mood and affect  Her behavior is normal  Thought content normal    Nursing note and vitals reviewed

## 2020-02-20 NOTE — ASSESSMENT & PLAN NOTE
As noted patient's sugars have shown good control, hemoglobin A1c is in the normal range  She admits that she is not always perfect with her diet  Patient will have a fasting blood sugar, hemoglobin A1c checked with her next visit    We will modify our initiate treatment if necessary

## 2020-04-07 DIAGNOSIS — I10 ESSENTIAL HYPERTENSION: ICD-10-CM

## 2020-04-07 DIAGNOSIS — E78.00 PURE HYPERCHOLESTEROLEMIA: ICD-10-CM

## 2020-04-07 DIAGNOSIS — K21.00 GASTROESOPHAGEAL REFLUX DISEASE WITH ESOPHAGITIS: ICD-10-CM

## 2020-04-07 DIAGNOSIS — F41.9 ANXIETY: ICD-10-CM

## 2020-04-07 RX ORDER — LOSARTAN POTASSIUM AND HYDROCHLOROTHIAZIDE 25; 100 MG/1; MG/1
1 TABLET ORAL DAILY
Qty: 90 TABLET | Refills: 1 | Status: SHIPPED | OUTPATIENT
Start: 2020-04-07 | End: 2020-10-03

## 2020-04-07 RX ORDER — POTASSIUM CHLORIDE 750 MG/1
10 TABLET, EXTENDED RELEASE ORAL DAILY
Qty: 90 TABLET | Refills: 1 | Status: SHIPPED | OUTPATIENT
Start: 2020-04-07 | End: 2020-10-03

## 2020-04-07 RX ORDER — ATORVASTATIN CALCIUM 10 MG/1
10 TABLET, FILM COATED ORAL DAILY
Qty: 90 TABLET | Refills: 3 | Status: SHIPPED | OUTPATIENT
Start: 2020-04-07 | End: 2021-04-01

## 2020-04-07 RX ORDER — RANITIDINE 150 MG/1
150 TABLET ORAL 2 TIMES DAILY
Qty: 90 TABLET | Refills: 1 | Status: SHIPPED | OUTPATIENT
Start: 2020-04-07 | End: 2020-06-05

## 2020-04-07 RX ORDER — CITALOPRAM 40 MG/1
40 TABLET ORAL DAILY
Qty: 90 TABLET | Refills: 1 | Status: SHIPPED | OUTPATIENT
Start: 2020-04-07 | End: 2020-10-03

## 2020-06-05 ENCOUNTER — TELEPHONE (OUTPATIENT)
Dept: INTERNAL MEDICINE CLINIC | Facility: CLINIC | Age: 85
End: 2020-06-05

## 2020-06-12 ENCOUNTER — APPOINTMENT (OUTPATIENT)
Dept: LAB | Age: 85
End: 2020-06-12
Payer: MEDICARE

## 2020-06-12 DIAGNOSIS — I10 ESSENTIAL HYPERTENSION: ICD-10-CM

## 2020-06-12 DIAGNOSIS — R73.9 HYPERGLYCEMIA: ICD-10-CM

## 2020-06-12 DIAGNOSIS — E78.01 FAMILIAL HYPERCHOLESTEROLEMIA: ICD-10-CM

## 2020-06-12 LAB
ANION GAP SERPL CALCULATED.3IONS-SCNC: 5 MMOL/L (ref 4–13)
BUN SERPL-MCNC: 13 MG/DL (ref 5–25)
CALCIUM SERPL-MCNC: 9 MG/DL (ref 8.3–10.1)
CHLORIDE SERPL-SCNC: 102 MMOL/L (ref 100–108)
CHOLEST SERPL-MCNC: 154 MG/DL (ref 50–200)
CO2 SERPL-SCNC: 29 MMOL/L (ref 21–32)
CREAT SERPL-MCNC: 0.81 MG/DL (ref 0.6–1.3)
EST. AVERAGE GLUCOSE BLD GHB EST-MCNC: 108 MG/DL
GFR SERPL CREATININE-BSD FRML MDRD: 67 ML/MIN/1.73SQ M
GLUCOSE P FAST SERPL-MCNC: 88 MG/DL (ref 65–99)
HBA1C MFR BLD: 5.4 %
HDLC SERPL-MCNC: 68 MG/DL
LDLC SERPL CALC-MCNC: 73 MG/DL (ref 0–100)
NONHDLC SERPL-MCNC: 86 MG/DL
POTASSIUM SERPL-SCNC: 3.3 MMOL/L (ref 3.5–5.3)
SODIUM SERPL-SCNC: 136 MMOL/L (ref 136–145)
TRIGL SERPL-MCNC: 63 MG/DL

## 2020-06-12 PROCEDURE — 80061 LIPID PANEL: CPT

## 2020-06-12 PROCEDURE — 36415 COLL VENOUS BLD VENIPUNCTURE: CPT

## 2020-06-12 PROCEDURE — 80048 BASIC METABOLIC PNL TOTAL CA: CPT

## 2020-06-12 PROCEDURE — 83036 HEMOGLOBIN GLYCOSYLATED A1C: CPT

## 2020-06-18 ENCOUNTER — OFFICE VISIT (OUTPATIENT)
Dept: INTERNAL MEDICINE CLINIC | Facility: CLINIC | Age: 85
End: 2020-06-18
Payer: MEDICARE

## 2020-06-18 VITALS
DIASTOLIC BLOOD PRESSURE: 86 MMHG | BODY MASS INDEX: 31.02 KG/M2 | OXYGEN SATURATION: 96 % | TEMPERATURE: 99.5 F | HEIGHT: 66 IN | SYSTOLIC BLOOD PRESSURE: 140 MMHG | HEART RATE: 79 BPM | WEIGHT: 193 LBS

## 2020-06-18 DIAGNOSIS — Z00.00 MEDICARE ANNUAL WELLNESS VISIT, SUBSEQUENT: ICD-10-CM

## 2020-06-18 DIAGNOSIS — M81.0 AGE-RELATED OSTEOPOROSIS WITHOUT CURRENT PATHOLOGICAL FRACTURE: ICD-10-CM

## 2020-06-18 DIAGNOSIS — F41.9 ANXIETY: ICD-10-CM

## 2020-06-18 DIAGNOSIS — E78.01 FAMILIAL HYPERCHOLESTEROLEMIA: ICD-10-CM

## 2020-06-18 DIAGNOSIS — I10 ESSENTIAL HYPERTENSION: Primary | ICD-10-CM

## 2020-06-18 DIAGNOSIS — E66.09 CLASS 1 OBESITY DUE TO EXCESS CALORIES WITHOUT SERIOUS COMORBIDITY WITH BODY MASS INDEX (BMI) OF 30.0 TO 30.9 IN ADULT: ICD-10-CM

## 2020-06-18 DIAGNOSIS — R73.9 HYPERGLYCEMIA: ICD-10-CM

## 2020-06-18 PROCEDURE — 3079F DIAST BP 80-89 MM HG: CPT | Performed by: INTERNAL MEDICINE

## 2020-06-18 PROCEDURE — 3008F BODY MASS INDEX DOCD: CPT | Performed by: INTERNAL MEDICINE

## 2020-06-18 PROCEDURE — 99214 OFFICE O/P EST MOD 30 MIN: CPT | Performed by: INTERNAL MEDICINE

## 2020-06-18 PROCEDURE — 4040F PNEUMOC VAC/ADMIN/RCVD: CPT | Performed by: INTERNAL MEDICINE

## 2020-06-18 PROCEDURE — 1036F TOBACCO NON-USER: CPT | Performed by: INTERNAL MEDICINE

## 2020-06-18 PROCEDURE — 1160F RVW MEDS BY RX/DR IN RCRD: CPT | Performed by: INTERNAL MEDICINE

## 2020-06-18 PROCEDURE — 3077F SYST BP >= 140 MM HG: CPT | Performed by: INTERNAL MEDICINE

## 2020-10-03 DIAGNOSIS — I10 ESSENTIAL HYPERTENSION: ICD-10-CM

## 2020-10-03 DIAGNOSIS — F41.9 ANXIETY: ICD-10-CM

## 2020-10-03 RX ORDER — CITALOPRAM 40 MG/1
TABLET ORAL
Qty: 90 TABLET | Refills: 3 | Status: SHIPPED | OUTPATIENT
Start: 2020-10-03 | End: 2021-09-28

## 2020-10-03 RX ORDER — POTASSIUM CHLORIDE 750 MG/1
TABLET, EXTENDED RELEASE ORAL
Qty: 90 TABLET | Refills: 3 | Status: SHIPPED | OUTPATIENT
Start: 2020-10-03 | End: 2021-09-28

## 2020-10-03 RX ORDER — LOSARTAN POTASSIUM AND HYDROCHLOROTHIAZIDE 25; 100 MG/1; MG/1
TABLET ORAL
Qty: 90 TABLET | Refills: 3 | Status: SHIPPED | OUTPATIENT
Start: 2020-10-03 | End: 2021-09-28

## 2020-10-17 ENCOUNTER — LAB (OUTPATIENT)
Dept: LAB | Age: 85
End: 2020-10-17
Payer: MEDICARE

## 2020-10-17 DIAGNOSIS — E78.01 FAMILIAL HYPERCHOLESTEROLEMIA: ICD-10-CM

## 2020-10-17 DIAGNOSIS — Z00.00 MEDICARE ANNUAL WELLNESS VISIT, SUBSEQUENT: ICD-10-CM

## 2020-10-17 DIAGNOSIS — R73.9 HYPERGLYCEMIA: ICD-10-CM

## 2020-10-17 DIAGNOSIS — I10 ESSENTIAL HYPERTENSION: ICD-10-CM

## 2020-10-17 DIAGNOSIS — F41.9 ANXIETY: ICD-10-CM

## 2020-10-17 LAB
ALBUMIN SERPL BCP-MCNC: 3.8 G/DL (ref 3.5–5)
ALP SERPL-CCNC: 54 U/L (ref 46–116)
ALT SERPL W P-5'-P-CCNC: 15 U/L (ref 12–78)
ANION GAP SERPL CALCULATED.3IONS-SCNC: 5 MMOL/L (ref 4–13)
AST SERPL W P-5'-P-CCNC: 11 U/L (ref 5–45)
BACTERIA UR QL AUTO: NORMAL /HPF
BASOPHILS # BLD AUTO: 0.02 THOUSANDS/ΜL (ref 0–0.1)
BASOPHILS NFR BLD AUTO: 0 % (ref 0–1)
BILIRUB SERPL-MCNC: 0.91 MG/DL (ref 0.2–1)
BILIRUB UR QL STRIP: NEGATIVE
BUN SERPL-MCNC: 12 MG/DL (ref 5–25)
CALCIUM SERPL-MCNC: 8.4 MG/DL (ref 8.3–10.1)
CHLORIDE SERPL-SCNC: 97 MMOL/L (ref 100–108)
CHOLEST SERPL-MCNC: 137 MG/DL (ref 50–200)
CLARITY UR: CLEAR
CO2 SERPL-SCNC: 30 MMOL/L (ref 21–32)
COLOR UR: YELLOW
CREAT SERPL-MCNC: 0.89 MG/DL (ref 0.6–1.3)
EOSINOPHIL # BLD AUTO: 0.05 THOUSAND/ΜL (ref 0–0.61)
EOSINOPHIL NFR BLD AUTO: 1 % (ref 0–6)
ERYTHROCYTE [DISTWIDTH] IN BLOOD BY AUTOMATED COUNT: 11.9 % (ref 11.6–15.1)
EST. AVERAGE GLUCOSE BLD GHB EST-MCNC: 103 MG/DL
GFR SERPL CREATININE-BSD FRML MDRD: 59 ML/MIN/1.73SQ M
GLUCOSE P FAST SERPL-MCNC: 99 MG/DL (ref 65–99)
GLUCOSE UR STRIP-MCNC: NEGATIVE MG/DL
HBA1C MFR BLD: 5.2 %
HCT VFR BLD AUTO: 36.3 % (ref 34.8–46.1)
HDLC SERPL-MCNC: 68 MG/DL
HGB BLD-MCNC: 12.2 G/DL (ref 11.5–15.4)
HGB UR QL STRIP.AUTO: ABNORMAL
HYALINE CASTS #/AREA URNS LPF: NORMAL /LPF
IMM GRANULOCYTES # BLD AUTO: 0.03 THOUSAND/UL (ref 0–0.2)
IMM GRANULOCYTES NFR BLD AUTO: 0 % (ref 0–2)
KETONES UR STRIP-MCNC: NEGATIVE MG/DL
LDLC SERPL CALC-MCNC: 55 MG/DL (ref 0–100)
LEUKOCYTE ESTERASE UR QL STRIP: NEGATIVE
LYMPHOCYTES # BLD AUTO: 1.7 THOUSANDS/ΜL (ref 0.6–4.47)
LYMPHOCYTES NFR BLD AUTO: 23 % (ref 14–44)
MCH RBC QN AUTO: 32 PG (ref 26.8–34.3)
MCHC RBC AUTO-ENTMCNC: 33.6 G/DL (ref 31.4–37.4)
MCV RBC AUTO: 95 FL (ref 82–98)
MONOCYTES # BLD AUTO: 0.54 THOUSAND/ΜL (ref 0.17–1.22)
MONOCYTES NFR BLD AUTO: 7 % (ref 4–12)
NEUTROPHILS # BLD AUTO: 5.18 THOUSANDS/ΜL (ref 1.85–7.62)
NEUTS SEG NFR BLD AUTO: 69 % (ref 43–75)
NITRITE UR QL STRIP: NEGATIVE
NON-SQ EPI CELLS URNS QL MICRO: NORMAL /HPF
NONHDLC SERPL-MCNC: 69 MG/DL
NRBC BLD AUTO-RTO: 0 /100 WBCS
PH UR STRIP.AUTO: 7.5 [PH]
PLATELET # BLD AUTO: 175 THOUSANDS/UL (ref 149–390)
PMV BLD AUTO: 10.7 FL (ref 8.9–12.7)
POTASSIUM SERPL-SCNC: 3.8 MMOL/L (ref 3.5–5.3)
PROT SERPL-MCNC: 7.3 G/DL (ref 6.4–8.2)
PROT UR STRIP-MCNC: NEGATIVE MG/DL
RBC # BLD AUTO: 3.81 MILLION/UL (ref 3.81–5.12)
RBC #/AREA URNS AUTO: NORMAL /HPF
SODIUM SERPL-SCNC: 132 MMOL/L (ref 136–145)
SP GR UR STRIP.AUTO: 1.01 (ref 1–1.03)
TRIGL SERPL-MCNC: 70 MG/DL
TSH SERPL DL<=0.05 MIU/L-ACNC: 1.12 UIU/ML (ref 0.36–3.74)
UROBILINOGEN UR QL STRIP.AUTO: 0.2 E.U./DL
WBC # BLD AUTO: 7.52 THOUSAND/UL (ref 4.31–10.16)
WBC #/AREA URNS AUTO: NORMAL /HPF

## 2020-10-17 PROCEDURE — 36415 COLL VENOUS BLD VENIPUNCTURE: CPT

## 2020-10-17 PROCEDURE — 81001 URINALYSIS AUTO W/SCOPE: CPT

## 2020-10-17 PROCEDURE — 83036 HEMOGLOBIN GLYCOSYLATED A1C: CPT

## 2020-10-17 PROCEDURE — 80053 COMPREHEN METABOLIC PANEL: CPT

## 2020-10-17 PROCEDURE — 85025 COMPLETE CBC W/AUTO DIFF WBC: CPT

## 2020-10-17 PROCEDURE — 84443 ASSAY THYROID STIM HORMONE: CPT

## 2020-10-17 PROCEDURE — 80061 LIPID PANEL: CPT

## 2020-10-22 ENCOUNTER — OFFICE VISIT (OUTPATIENT)
Dept: INTERNAL MEDICINE CLINIC | Facility: CLINIC | Age: 85
End: 2020-10-22
Payer: MEDICARE

## 2020-10-22 VITALS
HEIGHT: 66 IN | BODY MASS INDEX: 30.7 KG/M2 | HEART RATE: 74 BPM | DIASTOLIC BLOOD PRESSURE: 82 MMHG | SYSTOLIC BLOOD PRESSURE: 142 MMHG | TEMPERATURE: 98.7 F | WEIGHT: 191 LBS | OXYGEN SATURATION: 95 %

## 2020-10-22 DIAGNOSIS — R73.9 HYPERGLYCEMIA: ICD-10-CM

## 2020-10-22 DIAGNOSIS — I10 ESSENTIAL HYPERTENSION: Primary | ICD-10-CM

## 2020-10-22 DIAGNOSIS — E78.01 FAMILIAL HYPERCHOLESTEROLEMIA: ICD-10-CM

## 2020-10-22 DIAGNOSIS — Z23 ENCOUNTER FOR IMMUNIZATION: ICD-10-CM

## 2020-10-22 DIAGNOSIS — F41.9 ANXIETY: ICD-10-CM

## 2020-10-22 PROCEDURE — G0008 ADMIN INFLUENZA VIRUS VAC: HCPCS

## 2020-10-22 PROCEDURE — 90662 IIV NO PRSV INCREASED AG IM: CPT

## 2020-10-22 PROCEDURE — 99214 OFFICE O/P EST MOD 30 MIN: CPT | Performed by: INTERNAL MEDICINE

## 2020-12-15 ENCOUNTER — CLINICAL SUPPORT (OUTPATIENT)
Dept: INTERNAL MEDICINE CLINIC | Facility: CLINIC | Age: 85
End: 2020-12-15
Payer: MEDICARE

## 2020-12-15 DIAGNOSIS — M81.0 AGE-RELATED OSTEOPOROSIS WITHOUT CURRENT PATHOLOGICAL FRACTURE: Primary | ICD-10-CM

## 2021-02-10 ENCOUNTER — IMMUNIZATIONS (OUTPATIENT)
Dept: FAMILY MEDICINE CLINIC | Facility: HOSPITAL | Age: 86
End: 2021-02-10

## 2021-02-10 DIAGNOSIS — Z23 ENCOUNTER FOR IMMUNIZATION: Primary | ICD-10-CM

## 2021-02-10 PROCEDURE — 0001A SARS-COV-2 / COVID-19 MRNA VACCINE (PFIZER-BIONTECH) 30 MCG: CPT

## 2021-02-10 PROCEDURE — 91300 SARS-COV-2 / COVID-19 MRNA VACCINE (PFIZER-BIONTECH) 30 MCG: CPT

## 2021-02-21 ENCOUNTER — LAB (OUTPATIENT)
Dept: LAB | Age: 86
End: 2021-02-21
Payer: MEDICARE

## 2021-02-21 DIAGNOSIS — I10 ESSENTIAL HYPERTENSION: ICD-10-CM

## 2021-02-21 DIAGNOSIS — R73.9 HYPERGLYCEMIA: ICD-10-CM

## 2021-02-21 LAB
ANION GAP SERPL CALCULATED.3IONS-SCNC: 5 MMOL/L (ref 4–13)
BUN SERPL-MCNC: 12 MG/DL (ref 5–25)
CALCIUM SERPL-MCNC: 9.3 MG/DL (ref 8.3–10.1)
CHLORIDE SERPL-SCNC: 100 MMOL/L (ref 100–108)
CO2 SERPL-SCNC: 29 MMOL/L (ref 21–32)
CREAT SERPL-MCNC: 0.88 MG/DL (ref 0.6–1.3)
EST. AVERAGE GLUCOSE BLD GHB EST-MCNC: 105 MG/DL
GFR SERPL CREATININE-BSD FRML MDRD: 60 ML/MIN/1.73SQ M
GLUCOSE P FAST SERPL-MCNC: 96 MG/DL (ref 65–99)
HBA1C MFR BLD: 5.3 %
POTASSIUM SERPL-SCNC: 3.7 MMOL/L (ref 3.5–5.3)
SODIUM SERPL-SCNC: 134 MMOL/L (ref 136–145)

## 2021-02-21 PROCEDURE — 80048 BASIC METABOLIC PNL TOTAL CA: CPT

## 2021-02-21 PROCEDURE — 36415 COLL VENOUS BLD VENIPUNCTURE: CPT

## 2021-02-21 PROCEDURE — 83036 HEMOGLOBIN GLYCOSYLATED A1C: CPT

## 2021-02-25 ENCOUNTER — OFFICE VISIT (OUTPATIENT)
Dept: INTERNAL MEDICINE CLINIC | Facility: CLINIC | Age: 86
End: 2021-02-25
Payer: MEDICARE

## 2021-02-25 VITALS
HEIGHT: 66 IN | DIASTOLIC BLOOD PRESSURE: 76 MMHG | BODY MASS INDEX: 31.34 KG/M2 | SYSTOLIC BLOOD PRESSURE: 142 MMHG | HEART RATE: 75 BPM | TEMPERATURE: 99.1 F | WEIGHT: 195 LBS | OXYGEN SATURATION: 98 %

## 2021-02-25 DIAGNOSIS — Z00.00 MEDICARE ANNUAL WELLNESS VISIT, SUBSEQUENT: Primary | ICD-10-CM

## 2021-02-25 DIAGNOSIS — F41.9 ANXIETY: ICD-10-CM

## 2021-02-25 DIAGNOSIS — E66.09 CLASS 1 OBESITY DUE TO EXCESS CALORIES WITHOUT SERIOUS COMORBIDITY WITH BODY MASS INDEX (BMI) OF 31.0 TO 31.9 IN ADULT: ICD-10-CM

## 2021-02-25 DIAGNOSIS — M81.0 AGE-RELATED OSTEOPOROSIS WITHOUT CURRENT PATHOLOGICAL FRACTURE: ICD-10-CM

## 2021-02-25 DIAGNOSIS — I10 ESSENTIAL HYPERTENSION: ICD-10-CM

## 2021-02-25 DIAGNOSIS — R73.9 HYPERGLYCEMIA: ICD-10-CM

## 2021-02-25 DIAGNOSIS — E78.01 FAMILIAL HYPERCHOLESTEROLEMIA: ICD-10-CM

## 2021-02-25 PROCEDURE — 99214 OFFICE O/P EST MOD 30 MIN: CPT | Performed by: INTERNAL MEDICINE

## 2021-02-25 NOTE — ASSESSMENT & PLAN NOTE
Patient does have a longstanding history of anxiety disorder  She remains on Celexa at 40 mg a day  In the past she did take Ativan intermittently but now has stop this medication completely  She states that occasionally she will have some anxiety especially if watching the news  We did discuss with the patient counseling but she is refusing stating that overall she is managing

## 2021-02-25 NOTE — PATIENT INSTRUCTIONS
Obesity   AMBULATORY CARE:   Obesity  is when your body mass index (BMI) is greater than 30  Your healthcare provider will use your height and weight to measure your BMI  The risks of obesity include  many health problems, such as injuries or physical disability  You may need tests to check for the following:  · Diabetes    · High blood pressure or high cholesterol    · Heart disease    · Gallbladder or liver disease    · Cancer of the colon, breast, prostate, liver, or kidney    · Sleep apnea    · Arthritis or gout    Seek care immediately if:   · You have a severe headache, confusion, or difficulty speaking  · You have weakness on one side of your body  · You have chest pain, sweating, or shortness of breath  Contact your healthcare provider if:   · You have symptoms of gallbladder or liver disease, such as pain in your upper abdomen  · You have knee or hip pain and discomfort while walking  · You have symptoms of diabetes, such as intense hunger and thirst, and frequent urination  · You have symptoms of sleep apnea, such as snoring or daytime sleepiness  · You have questions or concerns about your condition or care  Treatment for obesity  focuses on helping you lose weight to improve your health  Even a small decrease in BMI can reduce the risk for many health problems  Your healthcare provider will help you set a weight-loss goal   · Lifestyle changes  are the first step in treating obesity  These include making healthy food choices and getting regular physical activity  Your healthcare provider may suggest a weight-loss program that involves coaching, education, and therapy  · Medicine  may help you lose weight when it is used with a healthy diet and physical activity  · Surgery  can help you lose weight if you are very obese and have other health problems  There are several types of weight-loss surgery  Ask your healthcare provider for more information      Be successful losing weight:   · Set small, realistic goals  An example of a small goal is to walk for 20 minutes 5 days a week  Anther goal is to lose 5% of your body weight  · Tell friends, family members, and coworkers about your goals  and ask for their support  Ask a friend to lose weight with you, or join a weight-loss support group  · Identify foods or triggers that may cause you to overeat , and find ways to avoid them  Remove tempting high-calorie foods from your home and workplace  Place a bowl of fresh fruit on your kitchen counter  If stress causes you to eat, then find other ways to cope with stress  · Keep a diary to track what you eat and drink  Also write down how many minutes of physical activity you do each day  Weigh yourself once a week and record it in your diary  Eating changes: You will need to eat 500 to 1,000 fewer calories each day than you currently eat to lose 1 to 2 pounds a week  The following changes will help you cut calories:  · Eat smaller portions  Use small plates, no larger than 9 inches in diameter  Fill your plate half full of fruits and vegetables  Measure your food using measuring cups until you know what a serving size looks like  · Eat 3 meals and 1 or 2 snacks each day  Plan your meals in advance  Edwin [place]ache and eat at home most of the time  Eat slowly  Do not skip meals  Skipping meals can lead to overeating later in the day  This can make it harder for you to lose weight  Talk with a dietitian to help you make a meal plan and schedule that is right for you  · Eat fruits and vegetables at every meal   They are low in calories and high in fiber, which makes you feel full  Do not add butter, margarine, or cream sauce to vegetables  Use herbs to season steamed vegetables  · Eat less fat and fewer fried foods  Eat more baked or grilled chicken and fish  These protein sources are lower in calories and fat than red meat  Limit fast food   Dress your salads with olive oil and vinegar instead of bottled dressing  · Limit the amount of sugar you eat  Do not drink sugary beverages  Limit alcohol  Activity changes:  Physical activity is good for your body in many ways  It helps you burn calories and build strong muscles  It decreases stress and depression, and improves your mood  It can also help you sleep better  Talk to your healthcare provider before you begin an exercise program   · Exercise for at least 30 minutes 5 days a week  Start slowly  Set aside time each day for physical activity that you enjoy and that is convenient for you  It is best to do both weight training and an activity that increases your heart rate, such as walking, bicycling, or swimming  · Find ways to be more active  Do yard work and housecleaning  Walk up the stairs instead of using elevators  Spend your leisure time going to events that require walking, such as outdoor festivals or fairs  This extra physical activity can help you lose weight and keep it off  Follow up with your healthcare provider as directed: You may need to meet with a dietitian  Write down your questions so you remember to ask them during your visits  © Copyright [address] [place] [address] Drive Information is for End User's use only and may not be sold, redistributed or otherwise used for commercial purposes  All illustrations and images included in CareNotes® are the copyrighted property of A D A M , Inc  or [facility]   The above information is an  only  It is not intended as medical advice for individual conditions or treatments  Talk to your doctor, nurse or pharmacist before following any medical regimen to see if it is safe and effective for you  Weight Management   AMBULATORY CARE:   Why it is important to manage your weight:  Being overweight increases your risk of health conditions such as heart disease, high blood pressure, type 2 diabetes, and certain types of cancer   It can also increase your risk for osteoarthritis, sleep apnea, and other respiratory problems  Aim for a slow, steady weight loss  Even a small amount of weight loss can lower your risk of health problems  How to lose weight safely:  A safe and healthy way to lose weight is to eat fewer calories and get regular exercise  · You can lose up about 1 pound a week by decreasing the number of calories you eat by 500 calories each day  You can decrease calories by eating smaller portion sizes or by cutting out high-calorie foods  Read labels to find out how many calories are in the foods you eat  · You can also burn calories with exercise such as walking, swimming, or biking  You will be more likely to keep weight off if you make these changes part of your lifestyle  Exercise at least 30 minutes per day on most days of the week  You can also fit in more physical activity by taking the stairs instead of the elevator or parking farther away from stores  Ask your healthcare provider about the best exercise plan for you  Healthy meal plan for weight management:  A healthy meal plan includes a variety of foods, contains fewer calories, and helps you stay healthy  A healthy meal plan includes the following:     · Eat whole-grain foods more often  A healthy meal plan should contain fiber  Fiber is the part of grains, fruits, and vegetables that is not broken down by your body  Whole-grain foods are healthy and provide extra fiber in your diet  Some examples of whole-grain foods are whole-wheat breads and pastas, oatmeal, brown rice, and bulgur  · Eat a variety of vegetables every day  Include dark, leafy greens such as spinach, kale, jani greens, and mustard greens  Eat yellow and orange vegetables such as carrots, sweet potatoes, and winter squash  · Eat a variety of fruits every day  Choose fresh or canned fruit (canned in its own juice or light syrup) instead of juice  Fruit juice has very little or no fiber      · Eat low-fat dairy foods  Drink fat-free (skim) milk or 1% milk  Eat fat-free yogurt and low-fat cottage cheese  Try low-fat cheeses such as mozzarella and other reduced-fat cheeses  · Choose meat and other protein foods that are low in fat  Choose beans or other legumes such as split peas or lentils  Choose fish, skinless poultry (chicken or turkey), or lean cuts of red meat (beef or pork)  Before you cook meat or poultry, cut off any visible fat  · Use less fat and oil  Try baking foods instead of frying them  Add less fat, such as margarine, sour cream, regular salad dressing and mayonnaise to foods  Eat fewer high-fat foods  Some examples of high-fat foods include french fries, doughnuts, ice cream, and cakes  · Eat fewer sweets  Limit foods and drinks that are high in sugar  This includes candy, cookies, regular soda, and sweetened drinks  Ways to decrease calories:   · Eat smaller portions  ? Use a small plate with smaller servings  ? Do not eat second helpings  ? When you eat at a restaurant, ask for a box and place half of your meal in the box before you eat  ? Share an entrée with someone else  · Replace high-calorie snacks with healthy, low-calorie snacks  ? Choose fresh fruit, vegetables, fat-free rice cakes, or air-popped popcorn instead of potato chips, nuts, or chocolate  ? Choose water or calorie-free drinks instead of soda or sweetened drinks  · Do not shop for groceries when you are hungry  You may be more likely to make unhealthy food choices  Take a grocery list of healthy foods and shop after you have eaten  · Eat regular meals  Do not skip meals  Skipping meals can lead to overeating later in the day  This can make it harder for you to lose weight  Eat a healthy snack in place of a meal if you do not have time to eat a regular meal  Talk with a dietitian to help you create a meal plan and schedule that is right for you      Other things to consider as you try to lose weight:   · Be aware of situations that may give you the urge to overeat, such as eating while watching television  Find ways to avoid these situations  For example, read a book, go for a walk, or do crafts  · Meet with a weight loss support group or friends who are also trying to lose weight  This may help you stay motivated to continue working on your weight loss goals  © Copyright [address] Information is for End User's use only and may not be sold, redistributed or otherwise used for commercial purposes  All illustrations and images included in CareNotes® are the copyrighted property of A D A M , Inc  or [address] [place], [place] [address]   The above information is an  only  It is not intended as medical advice for individual conditions or treatments  Talk to your doctor, nurse or pharmacist before following any medical regimen to see if it is safe and effective for you  Heart Healthy Diet   AMBULATORY CARE:   A heart healthy diet  is an eating plan low in unhealthy fats and sodium (salt)  The plan is high in healthy fats and fiber  A heart healthy diet helps improve your cholesterol levels and lowers your risk for heart disease and stroke  A dietitian will teach you how to read and understand food labels  Heart healthy diet guidelines to follow:   · Choose foods that contain healthy fats  ? Unsaturated fats  include monounsaturated and polyunsaturated fats  Unsaturated fat is found in foods such as soybean, canola, olive, corn, and safflower oils  It is also found in soft tub margarine that is made with liquid vegetable oil  ? Omega-3 fat  is found in certain fish, such as salmon, tuna, and trout, and in walnuts and flaxseed  Eat fish high in omega-3 fats at least 2 times a week  · Get 20 to 30 grams of fiber each day  Fruits, vegetables, whole-grain foods, and legumes (cooked beans) are good sources of fiber  · Limit or do not have unhealthy fats  ? Cholesterol  is found in animal foods, such as eggs and lobster, and in dairy products made from whole milk  Limit cholesterol to less than 200 mg each day  ? Saturated fat  is found in meats, such as leon and hamburger  It is also found in chicken or turkey skin, whole milk, and butter  Limit saturated fat to less than 7% of your total daily calories  ? Trans fat  is found in packaged foods, such as potato chips and cookies  It is also in hard margarine, some fried foods, and shortening  Do not eat foods that contain trans fats  · Limit sodium as directed  You may be told to limit sodium to 2,000 to 2,300 mg each day  Choose low-sodium or no-salt-added foods  Add little or no salt to food you prepare  Use herbs and spices in place of salt  Include the following in your heart healthy plan:  Ask your dietitian or healthcare provider how many servings to have from each of the following food groups:  · Grains:      ? Whole-wheat breads, cereals, and pastas, and brown rice    ? Low-fat, low-sodium crackers and chips    · Vegetables:      ? Broccoli, green beans, green peas, and spinach    ? Collards, kale, and lima beans    ? Carrots, sweet potatoes, tomatoes, and peppers    ? Canned vegetables with no salt added    · Fruits:      ? Bananas, peaches, pears, and pineapple    ? Grapes, raisins, and dates    ? Oranges, tangerines, grapefruit, orange juice, and grapefruit juice    ? Apricots, mangoes, melons, and papaya    ? Raspberries and strawberries    ? Canned fruit with no added sugar    · Low-fat dairy:      ? Nonfat (skim) milk, 1% milk, and low-fat almond, cashew, or soy milks fortified with calcium    ? Low-fat cheese, regular or frozen yogurt, and cottage cheese    · Meats and proteins:      ? Lean cuts of beef and pork (loin, leg, round), skinless chicken and turkey    ?  Legumes, soy products, egg whites, or nuts    Limit or do not include the following in your heart healthy plan: · Unhealthy fats and oils:      ? Whole or 2% milk, cream cheese, sour cream, or cheese    ? High-fat cuts of beef (T-bone steaks, ribs), chicken or turkey with skin, and organ meats such as liver    ? Butter, stick margarine, shortening, and cooking oils such as coconut or palm oil    · Foods and liquids high in sodium:      ? Packaged foods, such as frozen dinners, cookies, macaroni and cheese, and cereals with more than 300 mg of sodium per serving    ? Vegetables with added sodium, such as instant potatoes, vegetables with added sauces, or regular canned vegetables    ? Cured or smoked meats, such as hot dogs, leon, and sausage    ? High-sodium ketchup, barbecue sauce, salad dressing, pickles, olives, soy sauce, or miso    · Foods and liquids high in sugar:      ? Candy, cake, cookies, pies, or doughnuts    ? Soft drinks (soda), sports drinks, or sweetened tea    ? Canned or dry mixes for cakes, soups, sauces, or gravies    Other healthy heart guidelines:   · Do not smoke  Nicotine and other chemicals in cigarettes and cigars can cause lung and heart damage  Ask your healthcare provider for information if you currently smoke and need help to quit  E-cigarettes or smokeless tobacco still contain nicotine  Talk to your healthcare provider before you use these products  · Limit or do not drink alcohol as directed  Alcohol can damage your heart and raise your blood pressure  Your healthcare provider may give you specific daily and weekly limits  The general recommended limit is 1 drink a day for women 21 or older and for men 72 or older  Do not have more than 3 drinks in a day or 7 in a week  The recommended limit is 2 drinks a day for men 24to 59years of age  Do not have more than 4 drinks in a day or 14 in a week  A drink of alcohol is 12 ounces of beer, 5 ounces of wine, or 1½ ounces of liquor  · Exercise regularly    Exercise can help you maintain a healthy weight and improve your blood pressure and cholesterol levels  Regular exercise can also decrease your risk for heart problems  Ask your healthcare provider about the best exercise plan for you  Do not start an exercise program without asking your healthcare provider  Follow up with your doctor or cardiologist as directed:  Write down your questions so you remember to ask them during your visits  © Copyright [address] Information is for End User's use only and may not be sold, redistributed or otherwise used for commercial purposes  All illustrations and images included in CareNotes® are the copyrighted property of [employer]  or [address] [place] [address]  The above information is an  only  It is not intended as medical advice for individual conditions or treatments  Talk to your doctor, nurse or pharmacist before following any medical regimen to see if it is safe and effective for you  Calorie Counting Diet   WHAT YOU NEED TO KNOW:   What is a calorie counting diet? It is a meal plan based on counting calories each day to reach a healthy body weight  You will need to eat fewer calories if you are trying to lose weight  Weight loss may decrease your risk for certain health problems or improve your health if you have health problems  Some of these health problems include heart disease, high blood pressure, and diabetes  What foods should I avoid? Your dietitian will tell you if you need to avoid certain foods based on your body weight and health condition  You may need to avoid high-fat foods if you are at risk for or have heart disease  You may need to eat fewer foods from the breads and starches food group if you have diabetes  How many calories are in foods? The following is a list of foods and drinks with the approximate number of calories in each  Check the food label to find the exact number of calories  A dietitian can tell you how many calories you should have from each food group each day    · Carbohydrate: ? ½ of a 3-inch bagel, 1 slice of bread, or ½ of a hamburger bun or hot dog bun (80)    ? 1 (8-inch) flour tortilla or ½ cup of cooked rice (100)    ? 1 (6-inch) corn tortilla (80)    ? 1 (6-inch) pancake or 1 cup of bran flakes cereal (110)    ? ½ cup of cooked cereal (80)    ? ½ cup of cooked pasta (85)    ? 1 ounce of pretzels (100)    ? 3 cups of air-popped popcorn without butter or oil (80)    · Dairy:      ? 1 cup of skim or 1% milk (90)    ? 1 cup of 2% milk (120)    ? 1 cup of whole milk (160)    ? 1 cup of 2% chocolate milk (220)    ? 1 ounce of low-fat cheese with 3 grams of fat per ounce (70)    ? 1 ounce of cheddar cheese (114)    ? ½ cup of 1% fat cottage cheese (80)    ? 1 cup of plain or sugar-free, fat-free yogurt (90)    · Protein foods:      ? 3 ounces of fish (not breaded or fried) (95)    ? 3 ounces of breaded, fried fish (195)    ? ¾ cup of tuna canned in water (105)    ? 3 ounces of chicken breast without skin (105)    ? 1 fried chicken breast with skin (350)    ? ¼ cup of fat free egg substitute (40)    ? 1 large egg (75)    ? 3 ounces of lean beef or pork (165)    ? 3 ounces of fried pork chop or ham (185)    ? ½ cup of cooked dried beans, such as kidney, taylor, lentils, or navy (115)    ? 3 ounces of bologna or lunch meat (225)    ? 2 links of breakfast sausage (140)    · Vegetables:      ? ½ cup of sliced mushrooms (10)    ? 1 cup of salad greens, such as lettuce, spinach, or lizzette (15)    ? ½ cup of steamed asparagus (20)    ? ½ cup of cooked summer squash, zucchini squash, or green or wax beans (25)    ? 1 cup of broccoli or cauliflower florets, or 1 medium tomato (25)    ? 1 large raw carrot or ½ cup of cooked carrots (40)    ? ? of a medium cucumber or 1 stalk of celery (5)    ? 1 small baked potato (160)    ? 1 cup of breaded, fried vegetables (230)    · Fruit:      ? 1 (6-inch) banana (55)     ? ½ of a 4-inch grapefruit (55)    ? 15 grapes (60)    ?  1 medium orange or apple (70)    ? 1 large peach (65)    ? 1 cup of fresh pineapple chunks (75)    ? 1 cup of melon cubes (50)    ? 1¼ cups of whole strawberries (45)    ? ½ cup of fruit canned in juice (55)    ? ½ cup of fruit canned in heavy syrup (110)    ? ? cup of raisins (130)    ? ½ cup of unsweetened fruit juice (60)    ? ½ cup of grape, cranberry, or prune juice (90)    · Fat:      ? 10 peanuts or 2 teaspoons of peanut butter (55)    ? 2 tablespoons of avocado or 1 tablespoon of regular salad dressing (45)    ? 2 slices of leon (90)    ? 1 teaspoon of oil, such as safflower, canola, corn, or olive oil (45)    ? 2 teaspoons of low-fat margarine, or 1 tablespoon of low-fat mayonnaise (50)    ? 1 teaspoon of regular margarine (40)    ? 1 tablespoon of regular mayonnaise (135)    ? 1 tablespoon of cream cheese or 2 tablespoons of low-fat cream cheese (45)    ? 2 tablespoons of vegetable shortening (215)    · Dessert and sweets:      ? 8 animal crackers or 5 vanilla wafers (80)    ? 1 frozen fruit juice bar (80)    ? ½ cup of ice milk or low-fat frozen yogurt (90)    ? ½ cup of sherbet or sorbet (125)    ? ½ cup of sugar-free pudding or custard (60)    ? ½ cup of ice cream (140)    ? ½ cup of pudding or custard (175)    ? 1 (2-inch) square chocolate brownie (185)    · Combination foods:      ? Bean burrito made with an 8-inch tortilla, without cheese (275)    ? Chicken breast sandwich with lettuce and tomato (325)    ? 1 cup of chicken noodle soup (60)    ? 1 beef taco (175)    ? Regular hamburger with lettuce and tomato (310)    ? Regular cheeseburger with lettuce and tomato (410)     ? ¼ of a 12-inch cheese pizza (280)    ? Fried fish sandwich with lettuce and tomato (425)    ? Hot dog and bun (275)    ? 1½ cups of macaroni and cheese (310)    ? Taco salad with a fried tortilla shell (870)    · Low-calorie foods:      ? 1 tablespoon of ketchup or 1 tablespoon of fat free sour cream (15)    ? 1 teaspoon of mustard (5)    ?  ¼ cup of salsa (20)    ? 1 large dill pickle (15)    ? 1 tablespoon of fat free salad dressing (10)    ? 2 teaspoons of low-sugar, light jam or jelly, or 1 tablespoon of sugar-free syrup (15)    ? 1 sugar-free popsicle (15)    ? 1 cup of club soda, seltzer water, or diet soda (0)    CARE AGREEMENT:   You have the right to help plan your care  Discuss treatment options with your healthcare provider to decide what care you want to receive  You always have the right to refuse treatment  The above information is an  only  It is not intended as medical advice for individual conditions or treatments  Talk to your doctor, nurse or pharmacist before following any medical regimen to see if it is safe and effective for you  © Copyright [address] Information is for End User's use only and may not be sold, redistributed or otherwise used for commercial purposes   All illustrations and images included in CareNotes® are the copyrighted property of A D A M , Inc  or [address] [place] [address]

## 2021-02-25 NOTE — ASSESSMENT & PLAN NOTE
Patient did have labs performed prior to the visit today  Happy to report to patient that her blood sugar and hemoglobin A1c are normal   Again we did have a discussion about diet, decreasing her dietary intake, watching her intake of concentrated sweets and simple carbohydrates  We will check fasting blood sugar, hemoglobin A1c with her next visit

## 2021-02-25 NOTE — ASSESSMENT & PLAN NOTE
Patient does have a history of osteoporosis  She will be due for her  Repeat Prolia injection when she returns to the office in 4 months  We discussed with the patient repeating DEXA scan to make sure there are stability after her next visit  Patient understands and agrees  She had no side effects from the medication

## 2021-02-25 NOTE — ASSESSMENT & PLAN NOTE
History of hyperlipidemia  Patient remains on atorvastatin 10 mg daily  We did have a discussion again about diet, watching her intake of fats and cholesterol in order to keep her cholesterol under control  We will check a lipid profile with her next visit make adjustment to medication if necessary

## 2021-02-25 NOTE — ASSESSMENT & PLAN NOTE
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

## 2021-02-25 NOTE — PROGRESS NOTES
Assessment/Plan:    Hypertension   The patient does have a longstanding history of hypertension  She remains on losartan with hydrochlorothiazide and because of low potassium levels potassium supplement  As noted patient's blood pressure is under acceptable control today in the office  Patient will continue present medication  Prior to the visit today we would also checked on the patient's renal function and potassium which are stable  Age-related osteoporosis without current pathological fracture    Patient does have a history of osteoporosis  She will be due for her  Repeat Prolia injection when she returns to the office in 4 months  We discussed with the patient repeating DEXA scan to make sure there are stability after her next visit  Patient understands and agrees  She had no side effects from the medication  Anxiety    Patient does have a longstanding history of anxiety disorder  She remains on Celexa at 40 mg a day  In the past she did take Ativan intermittently but now has stop this medication completely  She states that occasionally she will have some anxiety especially if watching the news  We did discuss with the patient counseling but she is refusing stating that overall she is managing  Class 1 obesity due to excess calories in adult    With the patient's BMI she is considered obese  She has gained some weight since her last visit and states that because of stress and anxiety not only with Coronavirus but also with recent problems with snow storms and not being able to get back to [facility] she has been eating more comfort food  Patient was told to look closely at her dietary intake in order to reduce this in order to help weight loss    Because of her diffuse arthritis she is restricted with her physical activity level and I did state to her when the warmer weather comes at least try to get out and walk on a daily basis    Hyperglycemia   Patient did have labs performed prior to the visit today  Happy to report to patient that her blood sugar and hemoglobin A1c are normal   Again we did have a discussion about diet, decreasing her dietary intake, watching her intake of concentrated sweets and simple carbohydrates  We will check fasting blood sugar, hemoglobin A1c with her next visit  Hyperlipidemia    History of hyperlipidemia  Patient remains on atorvastatin 10 mg daily  We did have a discussion again about diet, watching her intake of fats and cholesterol in order to keep her cholesterol under control  We will check a lipid profile with her next visit make adjustment to medication if necessary  Medicare annual wellness visit, subsequent    Patient will be due for repeat Medicare wellness visit when she returns to the office in 4 months  She was given a slip for complete labs to be performed prior to that visit  She was told the interim if any new medical complaints or concerns please call  Diagnoses and all orders for this visit:    Medicare annual wellness visit, subsequent  -     Comprehensive metabolic panel; Future  -     CBC and differential; Future  -     Lipid panel; Future  -     UA (URINE) with reflex to Scope; Future  -     Hemoglobin A1C; Future    Familial hypercholesterolemia  -     Lipid panel; Future    Hyperglycemia  -     Hemoglobin A1C; Future    Class 1 obesity due to excess calories without serious comorbidity with body mass index (BMI) of 31 0 to 31 9 in adult    Anxiety    Essential hypertension  -     Comprehensive metabolic panel; Future  -     CBC and differential; Future  -     Lipid panel; Future  -     UA (URINE) with reflex to Scope; Future    Age-related osteoporosis without current pathological fracture          Subjective:      Patient ID: Afua Mckinney is a 80 y o  female  Patient is an 63-year-old female history of multiple medical problems as outlined previously    Patient is here today for a routine follow-up after four-month period of time  She did have labs performed prior to the visit today and we did discuss the results  Patient states in general she is doing well  She has just received her 1st COVID virus vaccine and will have a repeat vaccine next week  She had no ill effects from her vaccination  Patient states that at the most that she is out of the house approximately once per week to go grocery shopping but otherwise has been staying at home not going back to [place] because it is close because again the COVID virus pandemic  The following portions of the patient's history were reviewed and updated as appropriate: She  has a past medical history of Anxiety, GERD (gastroesophageal reflux disease), Hyperlipidemia, Hypertension, and Osteoarthritis  She   Patient Active Problem List    Diagnosis Date Noted    Medicare annual wellness visit, subsequent 10/24/2019    Age-related osteoporosis without current pathological fracture 06/05/2019    Acute cervical sprain, initial encounter 05/28/2019    Urinary hesitancy 03/22/2019    Class 1 obesity due to excess calories in adult 02/04/2019    Osteoporosis 08/14/2014    Hyperglycemia 07/25/2013    Hypertension 08/16/2012    Hyperlipidemia 08/16/2012    Anxiety 08/16/2012     She  has a past surgical history that includes Cataract extraction  Her family history includes Heart disease in her mother; Heart failure in her mother; Hypertension in her brother and father  She  reports that she has quit smoking  She has never used smokeless tobacco  She reports that she does not drink alcohol or use drugs    Current Outpatient Medications   Medication Sig Dispense Refill    aspirin 81 mg chewable tablet Chew Twice daily      atorvastatin (LIPITOR) 10 mg tablet Take 1 tablet (10 mg total) by mouth daily 90 tablet 3    Calcium Carbonate-Vitamin D (CALCIUM 600+D) 600-200 MG-UNIT TABS Take 1 tablet by mouth daily      citalopram (CeleXA) 40 mg tablet TAKE 1 TABLET DAILY 90 tablet 3  losartan-hydrochlorothiazide (HYZAAR) 100-25 MG per tablet TAKE 1 TABLET DAILY 90 tablet 3    potassium chloride (K-DUR,KLOR-CON) 10 mEq tablet TAKE 1 TABLET DAILY 90 tablet 3     No current facility-administered medications for this visit  Current Outpatient Medications on File Prior to Visit   Medication Sig    aspirin 81 mg chewable tablet Chew Twice daily    atorvastatin (LIPITOR) 10 mg tablet Take 1 tablet (10 mg total) by mouth daily    Calcium Carbonate-Vitamin D (CALCIUM 600+D) 600-200 MG-UNIT TABS Take 1 tablet by mouth daily    citalopram (CeleXA) 40 mg tablet TAKE 1 TABLET DAILY    losartan-hydrochlorothiazide (HYZAAR) 100-25 MG per tablet TAKE 1 TABLET DAILY    potassium chloride (K-DUR,KLOR-CON) 10 mEq tablet TAKE 1 TABLET DAILY     No current facility-administered medications on file prior to visit  She is allergic to ampicillin; sulfa antibiotics; and sulfamethoxazole-trimethoprim       Review of Systems   Constitutional: Positive for activity change (  States that during the winter and because of the COVID virus pandemic she has been more sedentary)  Negative for appetite change, chills, diaphoresis, fatigue, fever and unexpected weight change  HENT: Negative  Eyes: Negative  Respiratory: Negative  Cardiovascular: Positive for leg swelling ( chronic edema to bilateral lower extremities  States that she does wear support hose which does help)  Negative for chest pain and palpitations  Gastrointestinal: Negative  Endocrine: Negative  Genitourinary: Negative  Musculoskeletal: Positive for arthralgias ( some diffuse arthritic aches and pains but nothing new or disabling at this time)  Negative for back pain, gait problem, joint swelling, myalgias, neck pain and neck stiffness  Skin: Negative  Allergic/Immunologic: Negative  Neurological: Negative  Hematological: Negative      Psychiatric/Behavioral: Negative for agitation, behavioral problems, confusion, decreased concentration, dysphoric mood, hallucinations, self-injury, sleep disturbance and suicidal ideas  The patient is nervous/anxious  The patient is not hyperactive  Objective:      /76   Pulse 75   Temp 99 1 °F (37 3 °C) (Tympanic)   Ht 5' 6" (1 676 m)   Wt 88 5 kg (195 lb)   LMP  (LMP Unknown)   SpO2 98%   BMI 31 47 kg/m²          Physical Exam  Vitals signs and nursing note reviewed  Constitutional:       General: She is not in acute distress  Appearance: Normal appearance  She is obese  She is not ill-appearing, toxic-appearing or diaphoretic  Comments:  Pleasant articulate obese 25-year-old female who is awake alert no acute distress and oriented x3   HENT:      Head: Normocephalic and atraumatic  Right Ear: Tympanic membrane, ear canal and external ear normal  There is no impacted cerumen  Left Ear: Tympanic membrane, ear canal and external ear normal  There is no impacted cerumen  Nose: Nose normal  No congestion or rhinorrhea  Mouth/Throat:      Mouth: Mucous membranes are moist       Pharynx: Oropharynx is clear  No oropharyngeal exudate or posterior oropharyngeal erythema  Eyes:      General: No scleral icterus  Right eye: No discharge  Left eye: No discharge  Extraocular Movements: Extraocular movements intact  Conjunctiva/sclera: Conjunctivae normal       Pupils: Pupils are equal, round, and reactive to light  Neck:      Musculoskeletal: Normal range of motion and neck supple  No neck rigidity or muscular tenderness  Vascular: No carotid bruit  Cardiovascular:      Rate and Rhythm: Normal rate and regular rhythm  Pulses: Normal pulses  Heart sounds: Normal heart sounds  No murmur  No friction rub  No gallop  Pulmonary:      Effort: Pulmonary effort is normal  No respiratory distress  Breath sounds: Normal breath sounds  No stridor  No wheezing, rhonchi or rales     Chest:      Chest wall: No tenderness  Abdominal:      General: Bowel sounds are normal  There is no distension  Palpations: Abdomen is soft  There is no mass  Tenderness: There is no abdominal tenderness  There is no right CVA tenderness, left CVA tenderness, guarding or rebound  Hernia: No hernia is present  Comments:  obese   Musculoskeletal:         General: Deformity ( diffuse arthritic changes as previously including contracture to the 4th and 5th digit of her left hand  She is refusing any workup and evaluation or treatment) present  No swelling, tenderness or signs of injury  Right lower leg: Edema ( +1 pitting edema bilateral lower extremities which is chronic  Support hose in place) present  Left lower leg: Edema present  Lymphadenopathy:      Cervical: No cervical adenopathy  Skin:     General: Skin is warm and dry  Coloration: Skin is not jaundiced or pale  Findings: No bruising, erythema, lesion or rash  Neurological:      General: No focal deficit present  Mental Status: She is alert and oriented to person, place, and time  Mental status is at baseline  Cranial Nerves: No cranial nerve deficit  Sensory: No sensory deficit  Motor: No weakness  Coordination: Coordination normal       Gait: Gait normal       Deep Tendon Reflexes: Reflexes normal    Psychiatric:         Mood and Affect: Mood normal          Behavior: Behavior normal          Thought Content: Thought content normal          Judgment: Judgment normal          BMI Counseling: Body mass index is 31 47 kg/m²  The BMI is above normal  Nutrition recommendations include reducing portion sizes, decreasing overall calorie intake, 3-5 servings of fruits/vegetables daily, consuming healthier snacks, moderation in carbohydrate intake, increasing intake of lean protein, reducing intake of saturated fat and trans fat and reducing intake of cholesterol   Exercise recommendations include moderate aerobic physical activity for 150 minutes/week

## 2021-02-25 NOTE — ASSESSMENT & PLAN NOTE
Patient will be due for repeat Medicare wellness visit when she returns to the office in 4 months  She was given a slip for complete labs to be performed prior to that visit  She was told the interim if any new medical complaints or concerns please call

## 2021-03-03 ENCOUNTER — IMMUNIZATIONS (OUTPATIENT)
Dept: FAMILY MEDICINE CLINIC | Facility: HOSPITAL | Age: 86
End: 2021-03-03

## 2021-03-03 DIAGNOSIS — Z23 ENCOUNTER FOR IMMUNIZATION: Primary | ICD-10-CM

## 2021-03-03 PROCEDURE — 91300 SARS-COV-2 / COVID-19 MRNA VACCINE (PFIZER-BIONTECH) 30 MCG: CPT

## 2021-03-03 PROCEDURE — 0002A SARS-COV-2 / COVID-19 MRNA VACCINE (PFIZER-BIONTECH) 30 MCG: CPT

## 2021-04-01 DIAGNOSIS — E78.00 PURE HYPERCHOLESTEROLEMIA: ICD-10-CM

## 2021-04-01 RX ORDER — ATORVASTATIN CALCIUM 10 MG/1
TABLET, FILM COATED ORAL
Qty: 90 TABLET | Refills: 3 | Status: SHIPPED | OUTPATIENT
Start: 2021-04-01 | End: 2022-03-28

## 2021-06-17 ENCOUNTER — APPOINTMENT (OUTPATIENT)
Dept: LAB | Age: 86
End: 2021-06-17
Payer: MEDICARE

## 2021-06-17 DIAGNOSIS — I10 ESSENTIAL HYPERTENSION: ICD-10-CM

## 2021-06-17 DIAGNOSIS — Z00.00 MEDICARE ANNUAL WELLNESS VISIT, SUBSEQUENT: ICD-10-CM

## 2021-06-17 DIAGNOSIS — R73.9 HYPERGLYCEMIA: ICD-10-CM

## 2021-06-17 DIAGNOSIS — E78.01 FAMILIAL HYPERCHOLESTEROLEMIA: ICD-10-CM

## 2021-06-17 LAB
ALBUMIN SERPL BCP-MCNC: 3.7 G/DL (ref 3.5–5)
ALP SERPL-CCNC: 46 U/L (ref 46–116)
ALT SERPL W P-5'-P-CCNC: 13 U/L (ref 12–78)
ANION GAP SERPL CALCULATED.3IONS-SCNC: 4 MMOL/L (ref 4–13)
AST SERPL W P-5'-P-CCNC: 20 U/L (ref 5–45)
BASOPHILS # BLD AUTO: 0.02 THOUSANDS/ΜL (ref 0–0.1)
BASOPHILS NFR BLD AUTO: 0 % (ref 0–1)
BILIRUB SERPL-MCNC: 0.74 MG/DL (ref 0.2–1)
BILIRUB UR QL STRIP: NEGATIVE
BUN SERPL-MCNC: 10 MG/DL (ref 5–25)
CALCIUM SERPL-MCNC: 8.9 MG/DL (ref 8.3–10.1)
CHLORIDE SERPL-SCNC: 101 MMOL/L (ref 100–108)
CHOLEST SERPL-MCNC: 122 MG/DL (ref 50–200)
CLARITY UR: CLEAR
CO2 SERPL-SCNC: 30 MMOL/L (ref 21–32)
COLOR UR: YELLOW
CREAT SERPL-MCNC: 0.72 MG/DL (ref 0.6–1.3)
EOSINOPHIL # BLD AUTO: 0.09 THOUSAND/ΜL (ref 0–0.61)
EOSINOPHIL NFR BLD AUTO: 2 % (ref 0–6)
ERYTHROCYTE [DISTWIDTH] IN BLOOD BY AUTOMATED COUNT: 11.9 % (ref 11.6–15.1)
EST. AVERAGE GLUCOSE BLD GHB EST-MCNC: 114 MG/DL
GFR SERPL CREATININE-BSD FRML MDRD: 77 ML/MIN/1.73SQ M
GLUCOSE P FAST SERPL-MCNC: 89 MG/DL (ref 65–99)
GLUCOSE UR STRIP-MCNC: NEGATIVE MG/DL
HBA1C MFR BLD: 5.6 %
HCT VFR BLD AUTO: 35.2 % (ref 34.8–46.1)
HDLC SERPL-MCNC: 62 MG/DL
HGB BLD-MCNC: 11.8 G/DL (ref 11.5–15.4)
HGB UR QL STRIP.AUTO: NEGATIVE
IMM GRANULOCYTES # BLD AUTO: 0.02 THOUSAND/UL (ref 0–0.2)
IMM GRANULOCYTES NFR BLD AUTO: 0 % (ref 0–2)
KETONES UR STRIP-MCNC: NEGATIVE MG/DL
LDLC SERPL CALC-MCNC: 47 MG/DL (ref 0–100)
LEUKOCYTE ESTERASE UR QL STRIP: NEGATIVE
LYMPHOCYTES # BLD AUTO: 1.57 THOUSANDS/ΜL (ref 0.6–4.47)
LYMPHOCYTES NFR BLD AUTO: 26 % (ref 14–44)
MCH RBC QN AUTO: 31.8 PG (ref 26.8–34.3)
MCHC RBC AUTO-ENTMCNC: 33.5 G/DL (ref 31.4–37.4)
MCV RBC AUTO: 95 FL (ref 82–98)
MONOCYTES # BLD AUTO: 0.53 THOUSAND/ΜL (ref 0.17–1.22)
MONOCYTES NFR BLD AUTO: 9 % (ref 4–12)
NEUTROPHILS # BLD AUTO: 3.86 THOUSANDS/ΜL (ref 1.85–7.62)
NEUTS SEG NFR BLD AUTO: 63 % (ref 43–75)
NITRITE UR QL STRIP: NEGATIVE
NONHDLC SERPL-MCNC: 60 MG/DL
NRBC BLD AUTO-RTO: 0 /100 WBCS
PH UR STRIP.AUTO: 6.5 [PH]
PLATELET # BLD AUTO: 164 THOUSANDS/UL (ref 149–390)
PMV BLD AUTO: 10.1 FL (ref 8.9–12.7)
POTASSIUM SERPL-SCNC: 3.7 MMOL/L (ref 3.5–5.3)
PROT SERPL-MCNC: 6.9 G/DL (ref 6.4–8.2)
PROT UR STRIP-MCNC: NEGATIVE MG/DL
RBC # BLD AUTO: 3.71 MILLION/UL (ref 3.81–5.12)
SODIUM SERPL-SCNC: 135 MMOL/L (ref 136–145)
SP GR UR STRIP.AUTO: 1.01 (ref 1–1.03)
TRIGL SERPL-MCNC: 66 MG/DL
UROBILINOGEN UR QL STRIP.AUTO: 0.2 E.U./DL
WBC # BLD AUTO: 6.09 THOUSAND/UL (ref 4.31–10.16)

## 2021-06-17 PROCEDURE — 85025 COMPLETE CBC W/AUTO DIFF WBC: CPT

## 2021-06-17 PROCEDURE — 83036 HEMOGLOBIN GLYCOSYLATED A1C: CPT

## 2021-06-17 PROCEDURE — 80053 COMPREHEN METABOLIC PANEL: CPT

## 2021-06-17 PROCEDURE — 36415 COLL VENOUS BLD VENIPUNCTURE: CPT

## 2021-06-17 PROCEDURE — 80061 LIPID PANEL: CPT

## 2021-06-17 PROCEDURE — 81003 URINALYSIS AUTO W/O SCOPE: CPT

## 2021-06-23 ENCOUNTER — OFFICE VISIT (OUTPATIENT)
Dept: INTERNAL MEDICINE CLINIC | Facility: CLINIC | Age: 86
End: 2021-06-23
Payer: MEDICARE

## 2021-06-23 VITALS
DIASTOLIC BLOOD PRESSURE: 80 MMHG | HEART RATE: 80 BPM | SYSTOLIC BLOOD PRESSURE: 136 MMHG | BODY MASS INDEX: 30.22 KG/M2 | HEIGHT: 66 IN | OXYGEN SATURATION: 96 % | WEIGHT: 188 LBS | TEMPERATURE: 97.2 F

## 2021-06-23 DIAGNOSIS — I10 ESSENTIAL HYPERTENSION: ICD-10-CM

## 2021-06-23 DIAGNOSIS — M81.0 AGE-RELATED OSTEOPOROSIS WITHOUT CURRENT PATHOLOGICAL FRACTURE: Primary | ICD-10-CM

## 2021-06-23 DIAGNOSIS — Z00.00 MEDICARE ANNUAL WELLNESS VISIT, SUBSEQUENT: ICD-10-CM

## 2021-06-23 DIAGNOSIS — F41.9 ANXIETY: ICD-10-CM

## 2021-06-23 DIAGNOSIS — E78.01 FAMILIAL HYPERCHOLESTEROLEMIA: ICD-10-CM

## 2021-06-23 DIAGNOSIS — R73.9 HYPERGLYCEMIA: ICD-10-CM

## 2021-06-23 DIAGNOSIS — E66.09 CLASS 1 OBESITY DUE TO EXCESS CALORIES WITHOUT SERIOUS COMORBIDITY WITH BODY MASS INDEX (BMI) OF 30.0 TO 30.9 IN ADULT: ICD-10-CM

## 2021-06-23 DIAGNOSIS — S13.9XXA ACUTE CERVICAL SPRAIN, INITIAL ENCOUNTER: ICD-10-CM

## 2021-06-23 PROBLEM — G89.29 CHRONIC BILATERAL LOW BACK PAIN WITHOUT SCIATICA: Status: ACTIVE | Noted: 2021-06-23

## 2021-06-23 PROBLEM — M54.50 CHRONIC BILATERAL LOW BACK PAIN WITHOUT SCIATICA: Status: ACTIVE | Noted: 2021-06-23

## 2021-06-23 PROCEDURE — 1123F ACP DISCUSS/DSCN MKR DOCD: CPT | Performed by: INTERNAL MEDICINE

## 2021-06-23 PROCEDURE — 99214 OFFICE O/P EST MOD 30 MIN: CPT | Performed by: INTERNAL MEDICINE

## 2021-06-23 PROCEDURE — 90471 IMMUNIZATION ADMIN: CPT

## 2021-06-23 PROCEDURE — G0439 PPPS, SUBSEQ VISIT: HCPCS | Performed by: INTERNAL MEDICINE

## 2021-06-23 RX ORDER — IBUPROFEN 200 MG
TABLET ORAL EVERY 6 HOURS PRN
COMMUNITY

## 2021-06-23 NOTE — ASSESSMENT & PLAN NOTE
Longstanding history of anxiety disorder  Patient remains on citalopram 40 mg daily  States the medication has been helping  Continue present treatment was told if any new problems as far as anxiety is concerned to please call

## 2021-06-23 NOTE — ASSESSMENT & PLAN NOTE
Patient has been placed on Prolia for her osteoporosis  She has tolerated treatment without difficulties  She will receive next injection today in the office and continues with these every 6 months  We have scheduled the patient for repeat DEXA scan for re-evaluation further modification of treatment as indicated  She is taking her calcium and vitamin-D as instructed

## 2021-06-23 NOTE — PROGRESS NOTES
Assessment/Plan:    Medicare annual wellness visit, subsequent    Patient is an 59-year-old female history of medical problems as outlined previously  Patient is here today for repeat Medicare wellness visit  She did have extensive lab testing performed prior to the visit today and we did discuss the results  Patient states in general she is doing relatively well other than her chronic pain  She denies chest pain or pressure and no increasing shortness of breath  States her appetite is good and her daughter make sure that she watches her diet period she denies any bowel or bladder difficulties     She did undergo physical exam today in the office  Abnormalities as noted period she will receive her Prolia injection today in the office for her osteoporosis  We will arrange for the patient to have a repeat DEXA scan performed in the next few months  Of note we did discuss with the patient today going back for physical therapy evaluation with her chronic pain or discomfort or evaluation by pain management in order to help her but she is refusing  Patient will be scheduled to be seen in the office approximately 4 months for re-evaluation  Was told in the interim if any new medical problems or concerns to please call    Acute cervical sprain, initial encounter   States that she is having no new problems with cervical pain or discomfort  Age-related osteoporosis without current pathological fracture    Patient has been placed on Prolia for her osteoporosis  She has tolerated treatment without difficulties  She will receive next injection today in the office and continues with these every 6 months  We have scheduled the patient for repeat DEXA scan for re-evaluation further modification of treatment as indicated  She is taking her calcium and vitamin-D as instructed  Anxiety    Longstanding history of anxiety disorder  Patient remains on citalopram 40 mg daily  States the medication has been helping  Continue present treatment was told if any new problems as far as anxiety is concerned to please call  Class 1 obesity due to excess calories in adult    Patient has a history of exogenous obesity secondary to increase intake of calories  States that both she and her daughter had been working hard on her diet in order to help with weight loss and she has been successful  She is commended on her endeavors and states she will continue to work on her diet program   She is extremely limited with her exercise capacity because of her medical problems  Hyperglycemia    We did check a fasting blood sugar, hemoglobin A1c prior to today's visit and her sugar has shown good control  No evidence of diabetes  She continues to watch her diet especially intake of concentrated sweets and simple carbohydrates and calories overall and continues with weight loss  We will check a fasting blood sugar, hemoglobin A1c when she returns the office in 4 months    Hyperlipidemia   Patient remains on atorvastatin 10 mg a day  Her cholesterol showing excellent control  Patient will continue present medication and diet in order to control her cholesterol levels  Hypertension    Patient does have a longstanding history of hypertension  She remains on Hyzaar 100/25 mg daily  Blood pressure is controlled  Renal function is stable  Only abnormality with electrolytes is a slightly  Level  Low sodium level  Patient will continue present medication    Chronic bilateral low back pain without sciatica   Patient is complaining of some low back pain and discomfort  On evaluation patient does have some flattening to her lumbar curve, tender points upper and lower lumbar region bilaterally to palpation  Decreased range of motion both actively and passively with movement today  Aggravation of pain with side bending to the left, forward and backward bending    I did offer the patient a consult and evaluation by physical therapy to help with her chronic discomfort but she is refusing  Also suggested she be seen by pain management but again refuses period she states that she does have exercises given to her by physical therapy in the past and intends to start doing these to see if it helps with her discomfort  Diagnoses and all orders for this visit:    Age-related osteoporosis without current pathological fracture  -     DXA bone density spine hip and pelvis; Future  -     denosumab (PROLIA) subcutaneous injection 60 mg    Hyperglycemia  -     Basic metabolic panel; Future  -     Hemoglobin A1C; Future    Familial hypercholesterolemia    Medicare annual wellness visit, subsequent    Essential hypertension  -     Basic metabolic panel; Future    Acute cervical sprain, initial encounter    Anxiety    Class 1 obesity due to excess calories without serious comorbidity with body mass index (BMI) of 30 0 to 30 9 in adult    Other orders  -     ibuprofen (Advil) 200 mg tablet; Take by mouth every 6 (six) hours as needed for mild pain          Subjective:      Patient ID: Chato Linton is a 80 y o  female  The 51-year-old female history of medical problems as outlined previously  Patient is here today for repeat Medicare wellness visit  Did have extensive lab testing performed prior to visit today we did discuss the results  Patient states other than her back pain and discomfort she is doing relatively well  She still remains physically active  She is living with her daughter and states that this is a good relationship and that both she and her daughter are working hard on their diet   The following portions of the patient's history were reviewed and updated as appropriate:   She  has a past medical history of Anxiety, GERD (gastroesophageal reflux disease), Hyperlipidemia, Hypertension, and Osteoarthritis    She   Patient Active Problem List    Diagnosis Date Noted    Chronic bilateral low back pain without sciatica 06/23/2021    Medicare annual wellness visit, subsequent 10/24/2019    Age-related osteoporosis without current pathological fracture 06/05/2019    Acute cervical sprain, initial encounter 05/28/2019    Urinary hesitancy 03/22/2019    Class 1 obesity due to excess calories in adult 02/04/2019    Osteoporosis 08/14/2014    Hyperglycemia 07/25/2013    Hypertension 08/16/2012    Hyperlipidemia 08/16/2012    Anxiety 08/16/2012     She  has a past surgical history that includes Cataract extraction  Her family history includes Heart disease in her mother; Heart failure in her mother; Hypertension in her brother and father  She  reports that she has quit smoking  She has never used smokeless tobacco  She reports that she does not drink alcohol and does not use drugs  Current Outpatient Medications   Medication Sig Dispense Refill    aspirin 81 mg chewable tablet Chew Twice daily      atorvastatin (LIPITOR) 10 mg tablet TAKE 1 TABLET DAILY 90 tablet 3    Calcium Carbonate-Vitamin D (CALCIUM 600+D) 600-200 MG-UNIT TABS Take 1 tablet by mouth daily      citalopram (CeleXA) 40 mg tablet TAKE 1 TABLET DAILY 90 tablet 3    ibuprofen (Advil) 200 mg tablet Take by mouth every 6 (six) hours as needed for mild pain      losartan-hydrochlorothiazide (HYZAAR) 100-25 MG per tablet TAKE 1 TABLET DAILY 90 tablet 3    potassium chloride (K-DUR,KLOR-CON) 10 mEq tablet TAKE 1 TABLET DAILY 90 tablet 3     No current facility-administered medications for this visit       Current Outpatient Medications on File Prior to Visit   Medication Sig    aspirin 81 mg chewable tablet Chew Twice daily    atorvastatin (LIPITOR) 10 mg tablet TAKE 1 TABLET DAILY    Calcium Carbonate-Vitamin D (CALCIUM 600+D) 600-200 MG-UNIT TABS Take 1 tablet by mouth daily    citalopram (CeleXA) 40 mg tablet TAKE 1 TABLET DAILY    ibuprofen (Advil) 200 mg tablet Take by mouth every 6 (six) hours as needed for mild pain    losartan-hydrochlorothiazide (HYZAAR) 100-25 MG per tablet TAKE 1 TABLET DAILY    potassium chloride (K-DUR,KLOR-CON) 10 mEq tablet TAKE 1 TABLET DAILY     No current facility-administered medications on file prior to visit  She is allergic to ampicillin, sulfa antibiotics, and sulfamethoxazole-trimethoprim       Review of Systems   Constitutional: Positive for activity change ( statesThat she has reduced some activity levels at home especially cleaning around the house because of her back pain and discomfort)  Negative for appetite change, chills, diaphoresis, fatigue, fever and unexpected weight change  HENT: Negative  Eyes: Negative  Respiratory: Negative  Cardiovascular: Positive for leg swelling ( chronic edema bilateral lower extremities  Continues to wear sugar support hose)  Negative for chest pain and palpitations  Gastrointestinal: Negative  Endocrine: Negative  Genitourinary: Negative  Musculoskeletal: Positive for arthralgias and back pain  Negative for gait problem, joint swelling, myalgias, neck pain and neck stiffness  Skin: Negative  Allergic/Immunologic: Negative  Neurological: Negative  Hematological: Negative  Psychiatric/Behavioral: Negative  Objective:      /80   Pulse 80   Temp (!) 97 2 °F (36 2 °C) (Skin)   Ht 5' 6" (1 676 m)   Wt 85 3 kg (188 lb)   LMP  (LMP Unknown)   SpO2 96%   BMI 30 34 kg/m²          Physical Exam  Vitals and nursing note reviewed  Constitutional:       General: She is not in acute distress  Appearance: Normal appearance  She is obese  She is not ill-appearing, toxic-appearing or diaphoretic  Comments: A pleasant, articulate, obese 27-year-old female who is awake alert no acute distress and oriented x3   HENT:      Head: Normocephalic and atraumatic  Right Ear: Tympanic membrane, ear canal and external ear normal  There is no impacted cerumen        Left Ear: Tympanic membrane, ear canal and external ear normal  There is no impacted cerumen  Nose: Nose normal  No congestion or rhinorrhea  Mouth/Throat:      Mouth: Mucous membranes are moist       Pharynx: Oropharynx is clear  No oropharyngeal exudate or posterior oropharyngeal erythema  Eyes:      General: No scleral icterus  Right eye: No discharge  Left eye: No discharge  Extraocular Movements: Extraocular movements intact  Conjunctiva/sclera: Conjunctivae normal       Pupils: Pupils are equal, round, and reactive to light  Neck:      Vascular: No carotid bruit  Cardiovascular:      Rate and Rhythm: Normal rate and regular rhythm  Pulses: Normal pulses  Heart sounds: Normal heart sounds  No murmur heard  No friction rub  No gallop  Pulmonary:      Effort: Pulmonary effort is normal  No respiratory distress  Breath sounds: Normal breath sounds  No stridor  No wheezing, rhonchi or rales  Chest:      Chest wall: No tenderness  Abdominal:      General: Bowel sounds are normal  There is no distension  Palpations: Abdomen is soft  There is no mass  Tenderness: There is no abdominal tenderness  There is no right CVA tenderness, left CVA tenderness, guarding or rebound  Hernia: No hernia is present  Comments: Obese   Musculoskeletal:         General: Tenderness and deformity present  No swelling or signs of injury  Cervical back: Normal range of motion and neck supple  No rigidity or tenderness  Right lower leg: Edema present  Left lower leg: Edema present  Comments: On evaluation patient has diffuse arthritic changes noted to the hands and digits some contracture to the  4th and 5th digit of her right hand    On evaluation of her lumbar spine she has to her lumbar curve with mild scoliosis, tenderness to palpation to the paravertebral musculature in the lumbar region both upper and lower lumbar, patient does have decreased range of motion both passively and actively to the lumbar spine and exacerbation pain to the back with side bending to the left or to back her bending but not rotation but this is restricted  Patient also on evaluation and has a +2 chronic pitting edema bilateral lower extremities and she is wearing support hose   Lymphadenopathy:      Cervical: No cervical adenopathy  Skin:     General: Skin is warm and dry  Capillary Refill: Capillary refill takes less than 2 seconds  Coloration: Skin is not jaundiced or pale  Findings: No bruising, erythema, lesion or rash  Neurological:      Mental Status: She is alert and oriented to person, place, and time  Mental status is at baseline  Cranial Nerves: No cranial nerve deficit  Sensory: No sensory deficit  Motor: No weakness  Coordination: Coordination normal       Gait: Gait normal       Deep Tendon Reflexes: Reflexes abnormal ( unable to obtainPatellar Achilles tendon reflexes bilaterally)  Psychiatric:         Mood and Affect: Mood normal          Behavior: Behavior normal          Thought Content:  Thought content normal          Judgment: Judgment normal

## 2021-06-23 NOTE — ASSESSMENT & PLAN NOTE
Patient has a history of exogenous obesity secondary to increase intake of calories  States that both she and her daughter had been working hard on her diet in order to help with weight loss and she has been successful  She is commended on her endeavors and states she will continue to work on her diet program   She is extremely limited with her exercise capacity because of her medical problems

## 2021-06-23 NOTE — ASSESSMENT & PLAN NOTE
Patient is complaining of some low back pain and discomfort  On evaluation patient does have some flattening to her lumbar curve, tender points upper and lower lumbar region bilaterally to palpation  Decreased range of motion both actively and passively with movement today  Aggravation of pain with side bending to the left, forward and backward bending  I did offer the patient a consult and evaluation by physical therapy to help with her chronic discomfort but she is refusing  Also suggested she be seen by pain management but again refuses period she states that she does have exercises given to her by physical therapy in the past and intends to start doing these to see if it helps with her discomfort

## 2021-06-23 NOTE — ASSESSMENT & PLAN NOTE
Patient is an 60-year-old female history of medical problems as outlined previously  Patient is here today for repeat Medicare wellness visit  She did have extensive lab testing performed prior to the visit today and we did discuss the results  Patient states in general she is doing relatively well other than her chronic pain  She denies chest pain or pressure and no increasing shortness of breath  States her appetite is good and her daughter make sure that she watches her diet period she denies any bowel or bladder difficulties     She did undergo physical exam today in the office  Abnormalities as noted period she will receive her Prolia injection today in the office for her osteoporosis  We will arrange for the patient to have a repeat DEXA scan performed in the next few months  Of note we did discuss with the patient today going back for physical therapy evaluation with her chronic pain or discomfort or evaluation by pain management in order to help her but she is refusing  Patient will be scheduled to be seen in the office approximately 4 months for re-evaluation    Was told in the interim if any new medical problems or concerns to please call

## 2021-06-23 NOTE — ASSESSMENT & PLAN NOTE
Patient remains on atorvastatin 10 mg a day  Her cholesterol showing excellent control  Patient will continue present medication and diet in order to control her cholesterol levels

## 2021-06-23 NOTE — PROGRESS NOTES
Assessment and Plan:     Problem List Items Addressed This Visit     None           Preventive health issues were discussed with patient, and age appropriate screening tests were ordered as noted in patient's After Visit Summary  Personalized health advice and appropriate referrals for health education or preventive services given if needed, as noted in patient's After Visit Summary  History of Present Illness:     Patient presents for Medicare Annual Wellness visit    Patient Care Team:  Nuria Nick DO as PCP - General     Problem List:     Patient Active Problem List   Diagnosis    Hypertension    Hyperlipidemia    Hyperglycemia    Osteoporosis    Anxiety    Class 1 obesity due to excess calories in adult    Urinary hesitancy    Acute cervical sprain, initial encounter    Age-related osteoporosis without current pathological fracture    Medicare annual wellness visit, subsequent      Past Medical and Surgical History:     Past Medical History:   Diagnosis Date    Anxiety     GERD (gastroesophageal reflux disease)     Hyperlipidemia     Hypertension     Osteoarthritis      Past Surgical History:   Procedure Laterality Date    CATARACT EXTRACTION        Family History:     Family History   Problem Relation Age of Onset    Heart failure Mother     Heart disease Mother     Hypertension Father     Hypertension Brother       Social History:     Social History     Socioeconomic History    Marital status:       Spouse name: None    Number of children: None    Years of education: None    Highest education level: None   Occupational History    None   Tobacco Use    Smoking status: Former Smoker    Smokeless tobacco: Never Used   Substance and Sexual Activity    Alcohol use: No    Drug use: No    Sexual activity: None   Other Topics Concern    None   Social History Narrative    Daily Coffee Consumption (4 Cups/Day)     Social Determinants of Health     Financial Resource Strain:     Difficulty of Paying Living Expenses:    Food Insecurity:     Worried About Running Out of Food in the Last Year:     [address] in the Last Year:    Transportation Needs:     Lack of Transportation (Medical):  Lack of Transportation (Non-Medical):    Physical Activity:     Days of Exercise per Week:     Minutes of Exercise per Session:    Stress:     Feeling of Stress :    Social Connections:     Frequency of Communication with Friends and Family:     Frequency of Social Gatherings with Friends and Family:     Attends [employer] Services:     Active Member of Clubs or Organizations:     Attends Club or Organization Meetings:     Marital Status:    Intimate Partner Violence:     Fear of Current or Ex-Partner:     Emotionally Abused:     Physically Abused:     Sexually Abused:       Medications and Allergies:     Current Outpatient Medications   Medication Sig Dispense Refill    aspirin 81 mg chewable tablet Chew Twice daily      atorvastatin (LIPITOR) 10 mg tablet TAKE 1 TABLET DAILY 90 tablet 3    Calcium Carbonate-Vitamin D (CALCIUM 600+D) 600-200 MG-UNIT TABS Take 1 tablet by mouth daily      citalopram (CeleXA) 40 mg tablet TAKE 1 TABLET DAILY 90 tablet 3    ibuprofen (Advil) 200 mg tablet Take by mouth every 6 (six) hours as needed for mild pain      losartan-hydrochlorothiazide (HYZAAR) 100-25 MG per tablet TAKE 1 TABLET DAILY 90 tablet 3    potassium chloride (K-DUR,KLOR-CON) 10 mEq tablet TAKE 1 TABLET DAILY 90 tablet 3     No current facility-administered medications for this visit       Allergies   Allergen Reactions    Ampicillin      Other reaction(s): Unknown Allergic Reaction  Reaction Date: [id number];     Sulfa Antibiotics      Other reaction(s): Unknown Allergic Reaction    Sulfamethoxazole-Trimethoprim      Reaction Date: [id number];       Immunizations:     Immunization History   Administered Date(s) Administered    DTaP 5 05/14/2014    INFLUENZA 11/08/2015    Influenza Quadrivalent, 6-35 Months IM 09/28/2017    Influenza Split High Dose Preservative Free IM 09/23/2013, 09/11/2014, 09/08/2016    Influenza, high dose seasonal 0 7 mL 10/01/2018, 10/24/2019, 10/22/2020    Influenza, seasonal, injectable 10/01/2010    Pneumococcal Conjugate 13-Valent 12/03/2014    Pneumococcal Polysaccharide PPV23 02/04/2019    SARS-CoV-2 / COVID-19 mRNA IM ([employer]) 02/10/2021, 03/03/2021      Health Maintenance: There are no preventive care reminders to display for this patient  There are no preventive care reminders to display for this patient  Medicare Health Risk Assessment:     /80   Pulse 80   Temp (!) 97 2 °F (36 2 °C) (Skin)   Ht 5' 6" (1 676 m)   Wt 85 3 kg (188 lb)   LMP  (LMP Unknown)   SpO2 96%   BMI 30 34 kg/m²      Tri Moore is here for her Subsequent Wellness visit  Health Risk Assessment:   Patient rates overall health as good  Patient feels that their physical health rating is same  Patient is satisfied with their life  Eyesight was rated as same  Hearing was rated as same  Patient feels that their emotional and mental health rating is same  Patients states they are never, rarely angry  Patient states they are often unusually tired/fatigued  Pain experienced in the last 7 days has been some  Patient's pain rating has been 5/10  Patient states that she has experienced weight loss or gain in last 6 months  Depression Screening:   PHQ-2 Score: 0      Fall Risk Screening: In the past year, patient has experienced: no history of falling in past year      Urinary Incontinence Screening:   Patient has not leaked urine accidently in the last six months  Home Safety:  Patient has trouble with stairs inside or outside of their home  Patient has working smoke alarms and has working carbon monoxide detector  Home safety hazards include: none  Nutrition:   Current diet is Regular       Medications:   Patient is currently taking over-the-counter supplements  OTC medications include: see medication list  Patient is able to manage medications  Activities of Daily Living (ADLs)/Instrumental Activities of Daily Living (IADLs):   Walk and transfer into and out of bed and chair?: Yes  Dress and groom yourself?: Yes    Bathe or shower yourself?: Yes    Feed yourself? Yes  Do your laundry/housekeeping?: Yes  Manage your money, pay your bills and track your expenses?: Yes  Make your own meals?: Yes    Do your own shopping?: Yes    Previous Hospitalizations:   Any hospitalizations or ED visits within the last 12 months?: No      Advance Care Planning:   Living will: Yes    Durable POA for healthcare: Yes    Advanced directive: Yes      PREVENTIVE SCREENINGS      Cardiovascular Screening:    General: Screening Not Indicated and History Lipid Disorder      Diabetes Screening:     General: Screening Current      Colorectal Cancer Screening:     General: Screening Not Indicated      Cervical Cancer Screening:    General: Screening Not Indicated      Osteoporosis Screening:    General: Screening Not Indicated and History Osteoporosis      Lung Cancer Screening:     General: Screening Not Indicated    Screening, Brief Intervention, and Referral to Treatment (SBIRT)    Screening  Typical number of drinks in a day: 0  Typical number of drinks in a week: 0  Interpretation: Low risk drinking behavior      AUDIT-C Screenin) How often did you have a drink containing alcohol in the past year? never  2) How many drinks did you have on a typical day when you were drinking in the past year? 0  3) How often did you have 6 or more drinks on one occasion in the past year? never    AUDIT-C Score: 0  Interpretation: Score 0-2 (female): Negative screen for alcohol misuse    Single Item Drug Screening:  How often have you used an illegal drug (including marijuana) or a prescription medication for non-medical reasons in the past year? never    Single Item Drug Screen Score: 0  Interpretation: Negative screen for possible drug use disorder      Torrie Valentin, DO

## 2021-06-23 NOTE — ASSESSMENT & PLAN NOTE
We did check a fasting blood sugar, hemoglobin A1c prior to today's visit and her sugar has shown good control  No evidence of diabetes  She continues to watch her diet especially intake of concentrated sweets and simple carbohydrates and calories overall and continues with weight loss    We will check a fasting blood sugar, hemoglobin A1c when she returns the office in 4 months

## 2021-06-28 ENCOUNTER — HOSPITAL ENCOUNTER (OUTPATIENT)
Dept: RADIOLOGY | Age: 86
Discharge: HOME | End: 2021-06-28
Payer: MEDICARE

## 2021-06-28 DIAGNOSIS — M81.0 AGE-RELATED OSTEOPOROSIS WITHOUT CURRENT PATHOLOGICAL FRACTURE: ICD-10-CM

## 2021-06-28 PROCEDURE — 77080 DXA BONE DENSITY AXIAL: CPT

## 2021-09-28 DIAGNOSIS — I10 ESSENTIAL HYPERTENSION: ICD-10-CM

## 2021-09-28 DIAGNOSIS — F41.9 ANXIETY: ICD-10-CM

## 2021-09-28 RX ORDER — CITALOPRAM 40 MG/1
TABLET ORAL
Qty: 90 TABLET | Refills: 3 | Status: SHIPPED | OUTPATIENT
Start: 2021-09-28

## 2021-09-28 RX ORDER — LOSARTAN POTASSIUM AND HYDROCHLOROTHIAZIDE 25; 100 MG/1; MG/1
TABLET ORAL
Qty: 90 TABLET | Refills: 3 | Status: SHIPPED | OUTPATIENT
Start: 2021-09-28

## 2021-09-28 RX ORDER — POTASSIUM CHLORIDE 750 MG/1
TABLET, EXTENDED RELEASE ORAL
Qty: 90 TABLET | Refills: 3 | Status: SHIPPED | OUTPATIENT
Start: 2021-09-28

## 2021-10-14 ENCOUNTER — TELEPHONE (OUTPATIENT)
Dept: INTERNAL MEDICINE CLINIC | Facility: CLINIC | Age: 86
End: 2021-10-14

## 2021-11-09 ENCOUNTER — OFFICE VISIT (OUTPATIENT)
Dept: INTERNAL MEDICINE CLINIC | Facility: CLINIC | Age: 86
End: 2021-11-09
Payer: MEDICARE

## 2021-11-09 VITALS
HEIGHT: 66 IN | DIASTOLIC BLOOD PRESSURE: 78 MMHG | OXYGEN SATURATION: 98 % | WEIGHT: 189 LBS | BODY MASS INDEX: 30.37 KG/M2 | TEMPERATURE: 97.2 F | HEART RATE: 90 BPM | SYSTOLIC BLOOD PRESSURE: 139 MMHG

## 2021-11-09 DIAGNOSIS — M54.50 CHRONIC BILATERAL LOW BACK PAIN WITHOUT SCIATICA: Primary | ICD-10-CM

## 2021-11-09 DIAGNOSIS — Z23 ENCOUNTER FOR IMMUNIZATION: ICD-10-CM

## 2021-11-09 DIAGNOSIS — E78.01 FAMILIAL HYPERCHOLESTEROLEMIA: ICD-10-CM

## 2021-11-09 DIAGNOSIS — I10 PRIMARY HYPERTENSION: ICD-10-CM

## 2021-11-09 DIAGNOSIS — R73.9 HYPERGLYCEMIA: ICD-10-CM

## 2021-11-09 DIAGNOSIS — M81.0 AGE-RELATED OSTEOPOROSIS WITHOUT CURRENT PATHOLOGICAL FRACTURE: ICD-10-CM

## 2021-11-09 DIAGNOSIS — E66.09 CLASS 1 OBESITY DUE TO EXCESS CALORIES WITHOUT SERIOUS COMORBIDITY WITH BODY MASS INDEX (BMI) OF 30.0 TO 30.9 IN ADULT: ICD-10-CM

## 2021-11-09 DIAGNOSIS — J40 BRONCHITIS: ICD-10-CM

## 2021-11-09 DIAGNOSIS — G89.29 CHRONIC BILATERAL LOW BACK PAIN WITHOUT SCIATICA: Primary | ICD-10-CM

## 2021-11-09 PROCEDURE — 99214 OFFICE O/P EST MOD 30 MIN: CPT | Performed by: INTERNAL MEDICINE

## 2021-11-09 PROCEDURE — 90662 IIV NO PRSV INCREASED AG IM: CPT | Performed by: INTERNAL MEDICINE

## 2021-11-09 PROCEDURE — G0008 ADMIN INFLUENZA VIRUS VAC: HCPCS | Performed by: INTERNAL MEDICINE

## 2021-11-09 RX ORDER — METAXALONE 800 MG/1
400 TABLET ORAL 3 TIMES DAILY
Qty: 60 TABLET | Refills: 1 | Status: SHIPPED | OUTPATIENT
Start: 2021-11-09 | End: 2021-12-30

## 2021-11-21 ENCOUNTER — APPOINTMENT (OUTPATIENT)
Dept: LAB | Age: 86
End: 2021-11-21
Payer: MEDICARE

## 2021-11-21 DIAGNOSIS — I10 ESSENTIAL HYPERTENSION: ICD-10-CM

## 2021-11-21 DIAGNOSIS — R73.9 HYPERGLYCEMIA: ICD-10-CM

## 2021-11-21 LAB
ANION GAP SERPL CALCULATED.3IONS-SCNC: 5 MMOL/L (ref 4–13)
BUN SERPL-MCNC: 15 MG/DL (ref 5–25)
CALCIUM SERPL-MCNC: 9.3 MG/DL (ref 8.3–10.1)
CHLORIDE SERPL-SCNC: 102 MMOL/L (ref 100–108)
CO2 SERPL-SCNC: 29 MMOL/L (ref 21–32)
CREAT SERPL-MCNC: 0.85 MG/DL (ref 0.6–1.3)
EST. AVERAGE GLUCOSE BLD GHB EST-MCNC: 111 MG/DL
GFR SERPL CREATININE-BSD FRML MDRD: 62 ML/MIN/1.73SQ M
GLUCOSE P FAST SERPL-MCNC: 99 MG/DL (ref 65–99)
HBA1C MFR BLD: 5.5 %
POTASSIUM SERPL-SCNC: 4.2 MMOL/L (ref 3.5–5.3)
SODIUM SERPL-SCNC: 136 MMOL/L (ref 136–145)

## 2021-11-21 PROCEDURE — 80048 BASIC METABOLIC PNL TOTAL CA: CPT

## 2021-11-21 PROCEDURE — 36415 COLL VENOUS BLD VENIPUNCTURE: CPT

## 2021-11-21 PROCEDURE — 83036 HEMOGLOBIN GLYCOSYLATED A1C: CPT

## 2021-12-30 ENCOUNTER — OFFICE VISIT (OUTPATIENT)
Dept: INTERNAL MEDICINE CLINIC | Facility: CLINIC | Age: 86
End: 2021-12-30
Payer: MEDICARE

## 2021-12-30 VITALS
HEART RATE: 56 BPM | TEMPERATURE: 97.4 F | OXYGEN SATURATION: 96 % | HEIGHT: 66 IN | SYSTOLIC BLOOD PRESSURE: 136 MMHG | WEIGHT: 190 LBS | BODY MASS INDEX: 30.53 KG/M2 | DIASTOLIC BLOOD PRESSURE: 80 MMHG

## 2021-12-30 DIAGNOSIS — E78.01 FAMILIAL HYPERCHOLESTEROLEMIA: ICD-10-CM

## 2021-12-30 DIAGNOSIS — I10 PRIMARY HYPERTENSION: ICD-10-CM

## 2021-12-30 DIAGNOSIS — M81.0 AGE-RELATED OSTEOPOROSIS WITHOUT CURRENT PATHOLOGICAL FRACTURE: ICD-10-CM

## 2021-12-30 DIAGNOSIS — R73.9 HYPERGLYCEMIA: Primary | ICD-10-CM

## 2021-12-30 PROCEDURE — 99214 OFFICE O/P EST MOD 30 MIN: CPT | Performed by: INTERNAL MEDICINE

## 2022-03-28 DIAGNOSIS — E78.00 PURE HYPERCHOLESTEROLEMIA: ICD-10-CM

## 2022-03-28 RX ORDER — ATORVASTATIN CALCIUM 10 MG/1
TABLET, FILM COATED ORAL
Qty: 90 TABLET | Refills: 3 | Status: SHIPPED | OUTPATIENT
Start: 2022-03-28

## 2022-04-22 ENCOUNTER — RA CDI HCC (OUTPATIENT)
Dept: OTHER | Facility: HOSPITAL | Age: 87
End: 2022-04-22

## 2022-04-22 NOTE — PROGRESS NOTES
[address]  coding opportunities       Chart reviewed, no opportunity found: CHART REVIEWED, NO OPPORTUNITY FOUND        Patients Insurance     Medicare Insurance: Medicare

## 2022-04-24 ENCOUNTER — APPOINTMENT (OUTPATIENT)
Dept: LAB | Age: 87
End: 2022-04-24
Payer: MEDICARE

## 2022-04-24 DIAGNOSIS — E78.01 FAMILIAL HYPERCHOLESTEROLEMIA: ICD-10-CM

## 2022-04-24 DIAGNOSIS — I10 PRIMARY HYPERTENSION: ICD-10-CM

## 2022-04-24 DIAGNOSIS — R73.9 HYPERGLYCEMIA: ICD-10-CM

## 2022-04-24 LAB
ANION GAP SERPL CALCULATED.3IONS-SCNC: 5 MMOL/L (ref 4–13)
BUN SERPL-MCNC: 18 MG/DL (ref 5–25)
CALCIUM SERPL-MCNC: 8.9 MG/DL (ref 8.3–10.1)
CHLORIDE SERPL-SCNC: 98 MMOL/L (ref 100–108)
CHOLEST SERPL-MCNC: 152 MG/DL
CO2 SERPL-SCNC: 29 MMOL/L (ref 21–32)
CREAT SERPL-MCNC: 0.95 MG/DL (ref 0.6–1.3)
EST. AVERAGE GLUCOSE BLD GHB EST-MCNC: 105 MG/DL
GFR SERPL CREATININE-BSD FRML MDRD: 54 ML/MIN/1.73SQ M
GLUCOSE P FAST SERPL-MCNC: 111 MG/DL (ref 65–99)
HBA1C MFR BLD: 5.3 %
HDLC SERPL-MCNC: 64 MG/DL
LDLC SERPL CALC-MCNC: 70 MG/DL (ref 0–100)
NONHDLC SERPL-MCNC: 88 MG/DL
POTASSIUM SERPL-SCNC: 4.2 MMOL/L (ref 3.5–5.3)
SODIUM SERPL-SCNC: 132 MMOL/L (ref 136–145)
TRIGL SERPL-MCNC: 91 MG/DL

## 2022-04-24 PROCEDURE — 36415 COLL VENOUS BLD VENIPUNCTURE: CPT

## 2022-04-24 PROCEDURE — 80048 BASIC METABOLIC PNL TOTAL CA: CPT

## 2022-04-24 PROCEDURE — 83036 HEMOGLOBIN GLYCOSYLATED A1C: CPT

## 2022-04-24 PROCEDURE — 80061 LIPID PANEL: CPT

## 2022-04-29 ENCOUNTER — OFFICE VISIT (OUTPATIENT)
Dept: INTERNAL MEDICINE CLINIC | Facility: CLINIC | Age: 87
End: 2022-04-29
Payer: MEDICARE

## 2022-04-29 VITALS
DIASTOLIC BLOOD PRESSURE: 80 MMHG | TEMPERATURE: 98.5 F | OXYGEN SATURATION: 93 % | BODY MASS INDEX: 29.57 KG/M2 | WEIGHT: 184 LBS | SYSTOLIC BLOOD PRESSURE: 140 MMHG | HEART RATE: 79 BPM | HEIGHT: 66 IN

## 2022-04-29 DIAGNOSIS — M81.0 AGE-RELATED OSTEOPOROSIS WITHOUT CURRENT PATHOLOGICAL FRACTURE: ICD-10-CM

## 2022-04-29 DIAGNOSIS — Z00.00 MEDICARE ANNUAL WELLNESS VISIT, SUBSEQUENT: ICD-10-CM

## 2022-04-29 DIAGNOSIS — E78.01 FAMILIAL HYPERCHOLESTEROLEMIA: ICD-10-CM

## 2022-04-29 DIAGNOSIS — R73.9 HYPERGLYCEMIA: ICD-10-CM

## 2022-04-29 DIAGNOSIS — I10 PRIMARY HYPERTENSION: Primary | ICD-10-CM

## 2022-04-29 DIAGNOSIS — F41.9 ANXIETY: ICD-10-CM

## 2022-04-29 PROCEDURE — 99214 OFFICE O/P EST MOD 30 MIN: CPT | Performed by: INTERNAL MEDICINE

## 2022-04-29 NOTE — PROGRESS NOTES
Assessment/Plan:    Hyperglycemia  Patient did have labs performed prior to the visit today including a fasting blood sugar hemoglobin A1c  Did have a slight elevation in fasting blood sugar but hemoglobin A1c is in the range  Patient states that she is living with her daughter and is watching her intake of calories and has tried to give up concentrated sweets and simple carbohydrates  We will continue to monitor blood sugar readings and initiate treatment if necessary in the future  Hyperlipidemia  History of hyperlipidemia  She remains on atorvastatin 10 mg daily  Again relates that she is watching her intake of fats and cholesterol with her diet  We will check a lipid profile with her next visit    Hypertension  Blood pressure and renal function are stable  Continue to monitor and make adjustment to medication if needed in the future  Osteoporosis  History of osteoporosis  Patient continues with Prolia injections every 6 months and has tolerated this well  Patient continues to take her vitamin-D and calcium  We will continue to monitor her DEXA scans    Medicare annual wellness visit, subsequent  Will be due for repeat Medicare wellness visit when she returns the office in 4 months  She was given a slip for complete labs to be performed prior to visit  In the interim patient was told if any new medical problems or concerns to please call  Anxiety  Patient does have a longstanding history of anxiety disorder  Patient remains on citalopram 40 mg daily  She states from a emotional standpoint she is doing well  Will continue present treatment and told if any new emotional problems or concerns to please call  Diagnoses and all orders for this visit:    Primary hypertension    Hyperglycemia  -     Hemoglobin A1C; Future    Familial hypercholesterolemia    Medicare annual wellness visit, subsequent  -     Comprehensive metabolic panel;  Future  -     CBC and differential; Future  -     Lipid panel; Future  -     UA (URINE) with reflex to Scope; Future  -     Hemoglobin A1C; Future    Age-related osteoporosis without current pathological fracture    Anxiety          Subjective:      Patient ID: Shaina Ibarra is a 80 y o  female  Patient is an 72-year-old female history of medical problems as outlined previously who is here today for routine follow-up after four-month period of time  She did have labs performed prior to the visit today we did discuss the results only the patient  Patient states in general she is doing well  Continues to watch her diet and has lost small amount of weight  Relates a accidental fall at home approximately 6 weeks ago with some mild bumps and bruises  Some aggravation of her low back pain but states he is back to normal function at this time  The following portions of the patient's history were reviewed and updated as appropriate:   She  has a past medical history of Anxiety, GERD (gastroesophageal reflux disease), Hyperlipidemia, Hypertension, and Osteoarthritis  She   Patient Active Problem List    Diagnosis Date Noted    Bronchitis 11/09/2021    Chronic bilateral low back pain without sciatica 06/23/2021    Medicare annual wellness visit, subsequent 10/24/2019    Age-related osteoporosis without current pathological fracture 06/05/2019    Acute cervical sprain, initial encounter 05/28/2019    Urinary hesitancy 03/22/2019    Class 1 obesity due to excess calories in adult 02/04/2019    Osteoporosis 08/14/2014    Hyperglycemia 07/25/2013    Hypertension 08/16/2012    Hyperlipidemia 08/16/2012    Anxiety 08/16/2012     She  has a past surgical history that includes Cataract extraction  Her family history includes Heart disease in her mother; Heart failure in her mother; Hypertension in her brother and father  She  reports that she has quit smoking   She has never used smokeless tobacco  She reports that she does not drink alcohol and does not use drugs   Current Outpatient Medications   Medication Sig Dispense Refill    aspirin 81 mg chewable tablet Chew Twice daily      atorvastatin (LIPITOR) 10 mg tablet TAKE 1 TABLET DAILY 90 tablet 3    Calcium Carbonate-Vitamin D (CALCIUM 600+D) 600-200 MG-UNIT TABS Take 1 tablet by mouth daily      citalopram (CeleXA) 40 mg tablet TAKE 1 TABLET DAILY 90 tablet 3    ibuprofen (Advil) 200 mg tablet Take by mouth every 6 (six) hours as needed for mild pain      losartan-hydrochlorothiazide (HYZAAR) 100-25 MG per tablet TAKE 1 TABLET DAILY 90 tablet 3    potassium chloride (K-DUR,KLOR-CON) 10 mEq tablet TAKE 1 TABLET DAILY 90 tablet 3     Current Facility-Administered Medications   Medication Dose Route Frequency Provider Last Rate Last Admin    denosumab (PROLIA) subcutaneous injection 60 mg  60 mg Subcutaneous Q6 Months Reginald Pineda    60 mg at 12/30/21 1035     Current Outpatient Medications on File Prior to Visit   Medication Sig    aspirin 81 mg chewable tablet Chew Twice daily    atorvastatin (LIPITOR) 10 mg tablet TAKE 1 TABLET DAILY    Calcium Carbonate-Vitamin D (CALCIUM 600+D) 600-200 MG-UNIT TABS Take 1 tablet by mouth daily    citalopram (CeleXA) 40 mg tablet TAKE 1 TABLET DAILY    ibuprofen (Advil) 200 mg tablet Take by mouth every 6 (six) hours as needed for mild pain    losartan-hydrochlorothiazide (HYZAAR) 100-25 MG per tablet TAKE 1 TABLET DAILY    potassium chloride (K-DUR,KLOR-CON) 10 mEq tablet TAKE 1 TABLET DAILY     Current Facility-Administered Medications on File Prior to Visit   Medication    denosumab (PROLIA) subcutaneous injection 60 mg     She is allergic to ampicillin, sulfa antibiotics, and sulfamethoxazole-trimethoprim       Review of Systems   Constitutional: Negative  HENT: Negative  Eyes: Negative  Respiratory: Negative  Cardiovascular: Positive for leg swelling (Chronic edema bilateral lower extremities with no increase)   Negative for chest pain and palpitations  Gastrointestinal: Negative  Endocrine: Negative  Genitourinary: Negative  Musculoskeletal: Positive for arthralgias, back pain and gait problem  Negative for joint swelling, myalgias, neck pain and neck stiffness  Chronic osteo throat is, back pain  Does walk with a cane for safety and stability   Skin: Negative  Allergic/Immunologic: Negative  Neurological: Negative for dizziness, tremors, seizures, syncope, facial asymmetry, speech difficulty, weakness, light-headedness, numbness and headaches  Hematological: Negative  Psychiatric/Behavioral: Negative  Objective:      /80   Pulse 79   Temp 98 5 °F (36 9 °C)   Ht 5' 6" (1 676 m)   Wt 83 5 kg (184 lb)   LMP  (LMP Unknown)   SpO2 93%   BMI 29 70 kg/m²          Physical Exam  Vitals and nursing note reviewed  Constitutional:       General: She is not in acute distress  Appearance: Normal appearance  She is not ill-appearing, toxic-appearing or diaphoretic  Comments: Pleasant articulate cheerful 80-year-old female who is awake alert  Overweight   HENT:      Head: Normocephalic and atraumatic  Right Ear: Tympanic membrane, ear canal and external ear normal  There is no impacted cerumen  Left Ear: Tympanic membrane, ear canal and external ear normal  There is no impacted cerumen  Nose: Nose normal  No congestion or rhinorrhea  Mouth/Throat:      Mouth: Mucous membranes are moist       Pharynx: Oropharynx is clear  No oropharyngeal exudate or posterior oropharyngeal erythema  Eyes:      General: No scleral icterus  Right eye: No discharge  Left eye: No discharge  Extraocular Movements: Extraocular movements intact  Conjunctiva/sclera: Conjunctivae normal       Pupils: Pupils are equal, round, and reactive to light  Neck:      Vascular: No carotid bruit  Cardiovascular:      Rate and Rhythm: Normal rate and regular rhythm        Pulses: Normal pulses  Heart sounds: Normal heart sounds  No murmur heard  No friction rub  No gallop  Pulmonary:      Effort: Pulmonary effort is normal  No respiratory distress  Breath sounds: Normal breath sounds  No stridor  No wheezing, rhonchi or rales  Chest:      Chest wall: No tenderness  Abdominal:      General: Bowel sounds are normal  There is no distension  Palpations: Abdomen is soft  There is no mass  Tenderness: There is no abdominal tenderness  There is no right CVA tenderness, left CVA tenderness, guarding or rebound  Hernia: No hernia is present  Comments: Obese   Musculoskeletal:         General: Tenderness ( mild tenderness SI joints bilaterally) and deformity ( diffuse degenerative changes noted hands and digits, lumbar spine  No acute lesions) present  No swelling or signs of injury  Cervical back: Normal range of motion and neck supple  No rigidity or tenderness  Right lower leg: Edema (Plus one pitting edema feet and ankles bilaterally) present  Left lower leg: Edema present  Comments: Decreased range of motion to the lumbar spine both passively the movement but no pain today with movement  Lymphadenopathy:      Cervical: No cervical adenopathy  Skin:     General: Skin is warm and dry  Coloration: Skin is not jaundiced or pale  Findings: No bruising, erythema or lesion  Neurological:      Mental Status: She is alert and oriented to person, place, and time  Mental status is at baseline  Cranial Nerves: No cranial nerve deficit  Sensory: Sensory deficit ( slight paresthesia toes, digits to bilateral lower extremities) present  Motor: No weakness  Coordination: Coordination normal       Gait: Gait abnormal ( slightly antalgic gait walking with a cane)  Deep Tendon Reflexes: Reflexes abnormal ( absent patella and Achilles tendon reflexes bilaterally)     Psychiatric:         Mood and Affect: Mood normal  Behavior: Behavior normal          Thought Content:  Thought content normal          Judgment: Judgment normal

## 2022-04-29 NOTE — ASSESSMENT & PLAN NOTE
Blood pressure and renal function are stable  Continue to monitor and make adjustment to medication if needed in the future

## 2022-04-29 NOTE — ASSESSMENT & PLAN NOTE
Patient did have labs performed prior to the visit today including a fasting blood sugar hemoglobin A1c  Did have a slight elevation in fasting blood sugar but hemoglobin A1c is in the range  Patient states that she is living with her daughter and is watching her intake of calories and has tried to give up concentrated sweets and simple carbohydrates  We will continue to monitor blood sugar readings and initiate treatment if necessary in the future

## 2022-04-29 NOTE — ASSESSMENT & PLAN NOTE
History of hyperlipidemia  She remains on atorvastatin 10 mg daily  Again relates that she is watching her intake of fats and cholesterol with her diet    We will check a lipid profile with her next visit

## 2022-04-29 NOTE — ASSESSMENT & PLAN NOTE
Patient does have a longstanding history of anxiety disorder  Patient remains on citalopram 40 mg daily  She states from a emotional standpoint she is doing well  Will continue present treatment and told if any new emotional problems or concerns to please call

## 2022-04-29 NOTE — ASSESSMENT & PLAN NOTE
Will be due for repeat Medicare wellness visit when she returns the office in 4 months  She was given a slip for complete labs to be performed prior to visit  In the interim patient was told if any new medical problems or concerns to please call

## 2022-04-29 NOTE — ASSESSMENT & PLAN NOTE
History of osteoporosis  Patient continues with Prolia injections every 6 months and has tolerated this well  Patient continues to take her vitamin-D and calcium    We will continue to monitor her DEXA scans

## 2022-06-28 ENCOUNTER — OFFICE VISIT (OUTPATIENT)
Dept: INTERNAL MEDICINE CLINIC | Facility: CLINIC | Age: 87
End: 2022-06-28

## 2022-06-28 VITALS
SYSTOLIC BLOOD PRESSURE: 142 MMHG | TEMPERATURE: 99 F | HEART RATE: 94 BPM | OXYGEN SATURATION: 95 % | HEIGHT: 66 IN | BODY MASS INDEX: 29.57 KG/M2 | WEIGHT: 184 LBS | DIASTOLIC BLOOD PRESSURE: 76 MMHG

## 2022-06-28 DIAGNOSIS — M54.2 CERVICAL MUSCLE PAIN: Primary | ICD-10-CM

## 2022-06-28 DIAGNOSIS — M81.0 AGE-RELATED OSTEOPOROSIS WITHOUT CURRENT PATHOLOGICAL FRACTURE: ICD-10-CM

## 2022-06-28 DIAGNOSIS — I10 PRIMARY HYPERTENSION: ICD-10-CM

## 2022-06-28 PROCEDURE — 99214 OFFICE O/P EST MOD 30 MIN: CPT | Performed by: INTERNAL MEDICINE

## 2022-06-28 NOTE — ASSESSMENT & PLAN NOTE
Longstanding history of hypertension  She remains on losartan with hydrochlorothiazide 1 daily    We continue to monitor her blood pressure and renal function

## 2022-06-28 NOTE — ASSESSMENT & PLAN NOTE
Patient does have a history of osteoporosis and has been receiving Prolia injections through our office every 6 months  The patient will receive her injection today and has been tolerating these well  Patient continues with her vitamin-D and calcium supplements

## 2022-06-28 NOTE — PROGRESS NOTES
Assessment/Plan:    Cervical muscle pain  Patient is here today for her 6 month Prolia injection a but states that she is having problems with her cervical spine  She states she noted difficulties especially working in the [employer] with neck pain mostly on the right  States this is aggravated by certain positioning  No accident or injury  States the pain and discomfort continues and on a scale of 1-10 approximately a 4-5 headaches worse  Patient on evaluation does have an increased curvature to her cervical spine, tenderness to palpation lower cervical spine on the right with some muscle spasm  Patient has decreased range of motion throughout her cervical spine with movement but no pain with movement  Some tenderness and also muscle spasm to the area trapezius  Patient has no weakness to upper extremities  On evaluation it was felt that the patient would benefit from some physical therapy but was told if not improving that she needs to call and would consider consult and evaluation pain management x-rays of her cervical spine  She was told if any weakness in her arm, paresthesia, increasing pain to please call    Osteoporosis  Patient does have a history of osteoporosis and has been receiving Prolia injections through our office every 6 months  The patient will receive her injection today and has been tolerating these well  Patient continues with her vitamin-D and calcium supplements  Hypertension  Longstanding history of hypertension  She remains on losartan with hydrochlorothiazide 1 daily  We continue to monitor her blood pressure and renal function       Diagnoses and all orders for this visit:    Cervical muscle pain  -     Ambulatory Referral to Physical Therapy; Future    Primary hypertension    Age-related osteoporosis without current pathological fracture          Subjective:      Patient ID: Hussain Yañez is a 80 y o  female      Patient is a 59-year-old female history of medical problems as outlined previously  Patient was recently scheduled to be seen in the office for repeat Prolia injection because of her osteoporosis but is asking to be seen formally for evaluation with difficulties with increasing pain to the cervical region on the right  Denies any accident or injury  She states aggravated factors are working in the kitchen,      The following portions of the patient's history were reviewed and updated as appropriate:   She  has a past medical history of Anxiety, GERD (gastroesophageal reflux disease), Hyperlipidemia, Hypertension, and Osteoarthritis  She   Patient Active Problem List    Diagnosis Date Noted    Cervical muscle pain 06/28/2022    Bronchitis 11/09/2021    Chronic bilateral low back pain without sciatica 06/23/2021    Medicare annual wellness visit, subsequent 10/24/2019    Age-related osteoporosis without current pathological fracture 06/05/2019    Acute cervical sprain, initial encounter 05/28/2019    Urinary hesitancy 03/22/2019    Class 1 obesity due to excess calories in adult 02/04/2019    Osteoporosis 08/14/2014    Hyperglycemia 07/25/2013    Hypertension 08/16/2012    Hyperlipidemia 08/16/2012    Anxiety 08/16/2012     She  has a past surgical history that includes Cataract extraction  Her family history includes Heart disease in her mother; Heart failure in her mother; Hypertension in her brother and father  She  reports that she has quit smoking  She has never used smokeless tobacco  She reports that she does not drink alcohol and does not use drugs    Current Outpatient Medications   Medication Sig Dispense Refill    aspirin 81 mg chewable tablet Chew Twice daily      atorvastatin (LIPITOR) 10 mg tablet TAKE 1 TABLET DAILY 90 tablet 3    CALCIUM CARBONATE-VITAMIN D PO Take 1 tablet by mouth daily      citalopram (CeleXA) 40 mg tablet TAKE 1 TABLET DAILY 90 tablet 3    ibuprofen (MOTRIN) 200 mg tablet Take by mouth every 6 (six) hours as needed for mild pain      losartan-hydrochlorothiazide (HYZAAR) 100-25 MG per tablet TAKE 1 TABLET DAILY 90 tablet 3    potassium chloride (K-DUR,KLOR-CON) 10 mEq tablet TAKE 1 TABLET DAILY 90 tablet 3     Current Facility-Administered Medications   Medication Dose Route Frequency Provider Last Rate Last Admin    denosumab (PROLIA) subcutaneous injection 60 mg  60 mg Subcutaneous Q6 Months Tonja Arvizu DO   60 mg at 12/30/21 1035     Current Outpatient Medications on File Prior to Visit   Medication Sig    aspirin 81 mg chewable tablet Chew Twice daily    atorvastatin (LIPITOR) 10 mg tablet TAKE 1 TABLET DAILY    CALCIUM CARBONATE-VITAMIN D PO Take 1 tablet by mouth daily    citalopram (CeleXA) 40 mg tablet TAKE 1 TABLET DAILY    ibuprofen (MOTRIN) 200 mg tablet Take by mouth every 6 (six) hours as needed for mild pain    losartan-hydrochlorothiazide (HYZAAR) 100-25 MG per tablet TAKE 1 TABLET DAILY    potassium chloride (K-DUR,KLOR-CON) 10 mEq tablet TAKE 1 TABLET DAILY     Current Facility-Administered Medications on File Prior to Visit   Medication    denosumab (PROLIA) subcutaneous injection 60 mg     She is allergic to ampicillin, sulfa antibiotics, and sulfamethoxazole-trimethoprim       Review of Systems   Constitutional: Positive for activity change (The some restriction in activity especially working in the kitchen because of neck pain)  Negative for appetite change, chills, diaphoresis, fatigue, fever and unexpected weight change  HENT: Negative  Eyes: Negative  Respiratory: Negative  Cardiovascular: Positive for leg swelling ( chronic edema bilateral lower extremities with no increase)  Negative for chest pain and palpitations  Gastrointestinal: Negative  Endocrine: Negative  Genitourinary: Negative  Musculoskeletal: Positive for arthralgias, neck pain and neck stiffness  Negative for back pain, gait problem, joint swelling and myalgias  Skin: Negative  Allergic/Immunologic: Negative  Neurological: Negative  Hematological: Negative  Psychiatric/Behavioral: Negative  Objective:      /76   Pulse 94   Temp 99 °F (37 2 °C)   Ht 5' 6" (1 676 m)   Wt 83 5 kg (184 lb)   LMP  (LMP Unknown)   SpO2 95%   BMI 29 70 kg/m²          Physical Exam  Vitals and nursing note reviewed  Constitutional:       General: She is not in acute distress  Appearance: Normal appearance  She is not ill-appearing, toxic-appearing or diaphoretic  Comments: Pleasant articulate, overweight 80-year-old female who is awake alert no acute distress and oriented x3   HENT:      Head: Normocephalic and atraumatic  Right Ear: Tympanic membrane, ear canal and external ear normal  There is no impacted cerumen  Left Ear: Tympanic membrane, ear canal and external ear normal  There is no impacted cerumen  Nose: Nose normal  No congestion or rhinorrhea  Mouth/Throat:      Mouth: Mucous membranes are moist       Pharynx: Oropharynx is clear  No oropharyngeal exudate or posterior oropharyngeal erythema  Eyes:      General: No scleral icterus  Right eye: No discharge  Left eye: No discharge  Extraocular Movements: Extraocular movements intact  Conjunctiva/sclera: Conjunctivae normal       Pupils: Pupils are equal, round, and reactive to light  Neck:      Vascular: No carotid bruit  Comments: The evaluation of cervical spine as noted  Cardiovascular:      Rate and Rhythm: Normal rate and regular rhythm  Heart sounds: No murmur heard  No friction rub  No gallop  Pulmonary:      Effort: Pulmonary effort is normal  No respiratory distress  Breath sounds: Normal breath sounds  No stridor  No wheezing, rhonchi or rales  Chest:      Chest wall: No tenderness  Abdominal:      General: Bowel sounds are normal       Palpations: Abdomen is soft     Musculoskeletal:         General: Tenderness and deformity present  No signs of injury  Cervical back: Tenderness present  Right lower leg: Edema (Plus one edema bilateral lower extremities without change) present  Left lower leg: Edema present  Lymphadenopathy:      Cervical: No cervical adenopathy  Skin:     General: Skin is warm and dry  Coloration: Skin is not jaundiced or pale  Findings: No bruising, erythema, lesion or rash  Neurological:      Mental Status: She is alert and oriented to person, place, and time  Mental status is at baseline  Cranial Nerves: No cranial nerve deficit  Sensory: No sensory deficit  Motor: No weakness  Coordination: Coordination normal       Gait: Gait normal       Deep Tendon Reflexes: Reflexes normal    Psychiatric:         Mood and Affect: Mood normal          Behavior: Behavior normal          Thought Content:  Thought content normal          Judgment: Judgment normal

## 2022-06-28 NOTE — ASSESSMENT & PLAN NOTE
Patient is here today for her 6 month Prolia injection a but states that she is having problems with her cervical spine  She states she noted difficulties especially working in the [employer] with neck pain mostly on the right  States this is aggravated by certain positioning  No accident or injury  States the pain and discomfort continues and on a scale of 1-10 approximately a 4-5 headaches worse  Patient on evaluation does have an increased curvature to her cervical spine, tenderness to palpation lower cervical spine on the right with some muscle spasm  Patient has decreased range of motion throughout her cervical spine with movement but no pain with movement  Some tenderness and also muscle spasm to the area trapezius  Patient has no weakness to upper extremities  On evaluation it was felt that the patient would benefit from some physical therapy but was told if not improving that she needs to call and would consider consult and evaluation pain management x-rays of her cervical spine    She was told if any weakness in her arm, paresthesia, increasing pain to please call

## 2022-06-29 ENCOUNTER — TELEPHONE (OUTPATIENT)
Dept: INTERNAL MEDICINE CLINIC | Facility: CLINIC | Age: 87
End: 2022-06-29

## 2022-06-29 NOTE — TELEPHONE ENCOUNTER
Patient called asking if you could call her back because she had some questions prior to starting PT

## 2022-07-07 ENCOUNTER — EVALUATION (OUTPATIENT)
Dept: PHYSICAL THERAPY | Facility: CLINIC | Age: 87
End: 2022-07-07
Payer: MEDICARE

## 2022-07-07 DIAGNOSIS — M54.2 CERVICAL MUSCLE PAIN: Primary | ICD-10-CM

## 2022-07-07 PROCEDURE — 97110 THERAPEUTIC EXERCISES: CPT | Performed by: PHYSICAL THERAPIST

## 2022-07-07 PROCEDURE — 97161 PT EVAL LOW COMPLEX 20 MIN: CPT | Performed by: PHYSICAL THERAPIST

## 2022-07-07 NOTE — PROGRESS NOTES
PT Evaluation     Today's date: 2022  Patient name: Martina Tracey  : 1935  MRN: [id number]  Referring provider: Perez Dixon DO  Dx:   Encounter Diagnosis     ICD-10-CM    1  Cervical muscle pain  M54 2 Ambulatory Referral to Physical Therapy                  Assessment  Assessment details: Martina Tracey is a 80 y o  female presenting to outpatient physical therapy with chief complaints of neck pain  Patient describes chronic neck pain with recent increase in pain - no apparent cause  Patient displays with abnormal posture, restricted cervical AROM, no myotomal weakness, mechanically inconclusive - ROM improved with general mobility, and functional restrictions  Patient's symptoms are multifactorial in nature with a primary movement diagnosis of cervical hypomobility resulting in pathoanatomical signs and symptoms consistent with Cervical muscle pain  (primary encounter diagnosis) resulting in limitations in her ability to perform all housework and lifting without pain  No further referral appears necessary at this time based upon examination results  Please contact me if you have any questions ([phone])  Thank you for the opportunity to share in the care of this patient  Impairments: abnormal or restricted ROM, activity intolerance, impaired physical strength, lacks appropriate home exercise program and pain with function    Symptom irritability: lowUnderstanding of Dx/Px/POC: good   Prognosis: good  Prognosis details: Positive prognostic indicators include positive attitude toward recovery, motivated to improve, good understanding of condition, realistic expectations  Negative prognostic indicators include chronicity of symptoms  Goals  STG to be met in 3 weeks:  - Increase Cervical AROM: 10 degrees all planes  - Increase (B) UE strength by 1/2 MMT grade  - I with HEP    - Patient will be able to perform all housework with minimal difficulty     LTG to be met in 6 weeks:  - Increase Cervical AROM: 15 degrees all planes  - Increase (B) UE strength to 5/5 all planes  - I with updated HEP   - Patient will be able to return to lifting without increased pain  Plan  Plan details: Prognosis above is given PT services 2x/week tapering to 1x/week over the next 6 weeks and home program adherence  Patient would benefit from: skilled physical therapy  Planned modality interventions: cryotherapy and thermotherapy: hydrocollator packs  Planned therapy interventions: activity modification, joint mobilization, manual therapy, neuromuscular re-education, body mechanics training, patient education, postural training, strengthening, stretching, therapeutic activities, therapeutic exercise, functional ROM exercises and home exercise program  Plan of Care beginning date: 2022  Plan of Care expiration date: 2022  Treatment plan discussed with: patient        Subjective Evaluation    History of Present Illness  Mechanism of injury: At Evaluation (2022): Patient reports chronic neck and lower back pain for many years  She reports her symptoms have stayed pretty consistent  She reports at her last visit with her PCP he recommended PT for her neck  Red Flag Screen:  Patient denies recent fever, headache, dizziness, nausea, vomiting, vision changes, weakness, tingling, numbness, or traumatic SAUL        Greatest concern: stiffness and lack of exercise  Quality of life: good    Pain  Current pain ratin  At best pain ratin  At worst pain ratin  Location: Lower cervical spine  Quality: dull ache  Alleviating factors: Activity modification  Exacerbated by: Lifting, cleaning, reaching forward, looking down, cooking      Social Support  Steps to enter house: yes  Stairs in house: no   Lives in: [facility]  Lives with: adult children    Employment status: not working    Diagnostic Tests  No diagnostic tests performed  Treatments  No previous or current treatments  Patient Goals  Patient goal: Patient Specific Functional Scale: Return to lifting without pain 2/10, return to performing all housework without pain 0/10, return to cooking without increased pain 0/10; Total 2/30        Objective     Static Posture     Head  Forward  Thoracic Spine  Hyperkyphosis  Postural Observations  Seated posture: fair  Standing posture: fair        Palpation     Additional Palpation Details  TTP throughout cervical paraspinals     Active Range of Motion   Cervical/Thoracic Spine       Cervical    Flexion: 40 degrees   Extension: 35 degrees      Left lateral flexion: 20 degrees      Right lateral flexion: 25 degrees      Left rotation: 55 degrees  Right rotation: 50 degrees             Strength/Myotome Testing     Left Shoulder     Planes of Motion   Flexion: 4+   Extension: 4+   Abduction: 4   External rotation at 0°: 4+     Right Shoulder     Planes of Motion   Flexion: 4+   Extension: 4+   Abduction: 4   External rotation at 0°: 4+     Left Elbow   Flexion: 4+  Extension: 4+    Right Elbow   Flexion: 4+  Extension: 4+    Left Wrist/Hand   Wrist extension: 4+  Wrist flexion: 4+    Right Wrist/Hand   Wrist extension: 4+  Wrist flexion: 4+    Tests   Cervical   Positive neck flexor muscle endurance test   Negative vertical compression, cervical distraction, alar ligament test and Sharp-Za test      Left   Positive cervical flexion-rotation test       Right   Positive cervical flexion-rotation test      Lumbar   Negative vertical compression       Additional Tests Details  Repeated Movement Testing:  Repeated Retraction in Sitting: NE, NE - improved mobility all planes following  Repeated Retraction/ Extension in Sitting: NE, NE - improved mobility all planes following               Daily Treatment Diary     DX: Cervical Pain   EPOC:  Follow Up with Referring Provider:   Precautions: NA  CO-MORBIDITIES: HTN, Osteoporosis   HEP ACCESS CODE: [id number]       Stage: Chronic  Stability of Symptoms:  At Evaluation: stable - unchanged  Global Rating of Change:   Symptom Irritability Level: low  Primary Movement Diagnosis: cervical hypomobility   Patient Specific Functional Scale:   7/7/22: return to lifting without pain 2/10, return to performing all housework without pain 0/10, return to cooking without increased pain 0/10;  Total 2/30  FOTO Prediction: 65 by visit 0  FOTO Progress: 65 at evaluation   Greatest Concern: stiffness and lack of exercise  Current Activity Plan: added to HEP (7/7)  Current Educational Needs: Progressions      Treatment Diary          Manual Therapy         Gentle Cervical Mobs         Gentle Cervical Traction                                                      Therapeutic Exercise  HEP         UBE                    Cervical AROM Matrix                    Cervical Isometrics                    Seated Thoracic Rotation                                                  Neuromuscular Reeducation          TB Sh' Row          TB Sh' Ext          TB (B) ER                              [facility] [employer]                                                             Therapeutic Activity                              Gait Training                                        Modalities

## 2022-07-14 ENCOUNTER — OFFICE VISIT (OUTPATIENT)
Dept: PHYSICAL THERAPY | Facility: CLINIC | Age: 87
End: 2022-07-14
Payer: MEDICARE

## 2022-07-14 DIAGNOSIS — M54.2 CERVICAL MUSCLE PAIN: Primary | ICD-10-CM

## 2022-07-14 PROCEDURE — 97112 NEUROMUSCULAR REEDUCATION: CPT | Performed by: PHYSICAL THERAPIST

## 2022-07-14 PROCEDURE — 97110 THERAPEUTIC EXERCISES: CPT | Performed by: PHYSICAL THERAPIST

## 2022-07-14 NOTE — PROGRESS NOTES
Daily Note     Today's date: 2022  Patient name: Rock Han  : 1935  MRN: [id number]  Referring provider: Anil Chapman DO  Dx:   Encounter Diagnosis     ICD-10-CM    1  Cervical muscle pain  M54 2                   Subjective: Patient reports good tolerance to her LV  She reports her HEP is going well - has been very consistent with her HEP  Objective: See treatment diary below      Assessment:   Stage: Chronic  Stability of Symptoms:  At Evaluation: stable - unchanged  Global Rating of Change:   Symptom Irritability Level: low  Primary Movement Diagnosis: cervical hypomobility   Patient Specific Functional Scale:   22: return to lifting without pain 2/10, return to performing all housework without pain 0/10, return to cooking without increased pain 0/10; Total   FOTO Prediction: 65 by visit 10  FOTO Progress: 65 at evaluation   Greatest Concern: stiffness and lack of exercise  Current Activity Plan: added to HEP ()  Current Educational Needs: Progressions    Patient had good tolerance to manual treatment - cervical mobility is improving in all directions  She demonstrated good form with previously issued HEP  Isometric exercises were added with good understanding  She tolerated gentle progressions with strengthening well  Skilled PT continues to be required to guide progression of cervical mobility and strength to allow her to return to lifting and performing housework without increased pain          Plan: Continue with POC - monitor tolerance to treatment - progress as able NV     Daily Treatment Diary     DX: Cervical Pain   EPOC: 22  Follow Up with Referring Provider:   Precautions: NA  CO-MORBIDITIES: HTN, Osteoporosis   HEP ACCESS CODE: [id number]       Treatment Diary          Manual Therapy         Gentle Cervical Mobs PA  Gr /2  4 min        Gentle Cervical Traction 4 min                                                     Therapeutic Exercise  HEP UBE  2'/2'                  Cervical AROM Matrix 7/7 6 Way  10x ea                  Cervical Isometrics 7/14 4 Way  5"x10 ea                  Seated Thoracic Rotation  10x ea                                                Neuromuscular Reeducation          TB Sh' Row  GTB  15x        TB Sh' Ext  GTB  15x        TB (B) ER  RTB  15x                            [facility] to [employer]   5 cones  2x ea                                                           Therapeutic Activity                              Gait Training                                        Modalities

## 2022-07-21 ENCOUNTER — OFFICE VISIT (OUTPATIENT)
Dept: PHYSICAL THERAPY | Facility: CLINIC | Age: 87
End: 2022-07-21
Payer: MEDICARE

## 2022-07-21 DIAGNOSIS — M54.2 CERVICAL MUSCLE PAIN: Primary | ICD-10-CM

## 2022-07-21 PROCEDURE — 97112 NEUROMUSCULAR REEDUCATION: CPT | Performed by: PHYSICAL THERAPIST

## 2022-07-21 PROCEDURE — 97110 THERAPEUTIC EXERCISES: CPT | Performed by: PHYSICAL THERAPIST

## 2022-07-21 NOTE — PROGRESS NOTES
Daily Note     Today's date: 2022  Patient name: Jennifer Lopez  : 1935  MRN: [id number]  Referring provider: Aruna Syed DO  Dx:   Encounter Diagnosis     ICD-10-CM    1  Cervical muscle pain  M54 2                   Subjective: Patient reports her neck is feeling very good - feels ready to transition to an I HEP  Currently her lower back is bothering her mostly  Objective: See treatment diary below      Assessment:   Stage: Chronic  Stability of Symptoms:  At Evaluation: stable - unchanged  Global Rating of Change:   Symptom Irritability Level: low  Primary Movement Diagnosis: cervical hypomobility   Patient Specific Functional Scale:   22: return to lifting without pain 2/10, return to performing all housework without pain 0/10, return to cooking without increased pain 0/10; Total   FOTO Prediction: 65 by visit 10  FOTO Progress: 65 at evaluation   Greatest Concern: stiffness and lack of exercise  Current Activity Plan: added to HEP ()  Current Educational Needs: Progressions    Patient had good tolerance to manual treatment  She had no complaints of increased pain throughout treatment  She feels ready to transition to an I HEP for her neck  At this time it is recommended that she continue with an I HEP  She is to contact me if she has return of symptoms or any questions/ concerns            Plan: DC to I HEP - evaluate lower back NV     Daily Treatment Diary     DX: Cervical Pain   EPOC: 22  Follow Up with Referring Provider:   Precautions: NA  CO-MORBIDITIES: HTN, Osteoporosis   HEP ACCESS CODE: [id number]       Treatment Diary         Manual Therapy         Gentle Cervical Mobs PA  Gr 1/2  4 min PA  Gr 1/2  4 min       Gentle Cervical Traction 4 min 4 min                                                    Therapeutic Exercise  HEP         UBE  2'/2' 2'/2'                 Cervical AROM Matrix  6 Way  10x ea 6 Way  10x ea                 Cervical Isometrics 7/14 4 Way  5"x10 ea 4 Way   5"x10 ea                 Seated Thoracic Rotation  10x ea 10x ea                                               Neuromuscular Reeducation          TB Sh' Row  GTB  15x GTB  20x       TB Sh' Ext  GTB  15x GTB  20x       TB (B) ER  RTB  15x RTB  15x                           Cone to [place]   5 cones  2x ea  5 cones  2x ea                                                         Therapeutic Activity                              Gait Training                                        Modalities

## 2022-07-28 ENCOUNTER — OFFICE VISIT (OUTPATIENT)
Dept: PHYSICAL THERAPY | Facility: CLINIC | Age: 87
End: 2022-07-28
Payer: MEDICARE

## 2022-07-28 DIAGNOSIS — M54.50 CHRONIC MIDLINE LOW BACK PAIN WITHOUT SCIATICA: Primary | ICD-10-CM

## 2022-07-28 DIAGNOSIS — G89.29 CHRONIC MIDLINE LOW BACK PAIN WITHOUT SCIATICA: Primary | ICD-10-CM

## 2022-07-28 PROCEDURE — 97110 THERAPEUTIC EXERCISES: CPT | Performed by: PHYSICAL THERAPIST

## 2022-07-28 PROCEDURE — 97162 PT EVAL MOD COMPLEX 30 MIN: CPT | Performed by: PHYSICAL THERAPIST

## 2022-07-28 NOTE — PROGRESS NOTES
PT Evaluation     Today's date: 2022  Patient name: Flor Velasco  : 1935  MRN: [id number]  Referring provider: Maximilian Plascencia PT  Dx:   Encounter Diagnosis     ICD-10-CM    1  Chronic midline low back pain without sciatica  M54 50     G89 29                   Assessment  Assessment details: Flor Velasco is a 80 y o  female presenting to outpatient physical therapy with chief complaints of chronic lower back pain  Patient describes chronic back pain for many years with recent aggravation in April  Patient displays with abnormal gait, slight lumbar AROM restriction, (B) hip strength deficits, (B) hip AROM restriction, impaired balance, (+) hip testing, and functional restrictions  Patient's symptoms are multifactorial in nature with a primary movement diagnosis of poor motor control resulting in pathoanatomical signs and symptoms consistent with Chronic midline low back pain without sciatica  (primary encounter diagnosis) resulting in limitations in her ability to exercise as desired  No further referral appears necessary at this time based upon examination results  Please contact me if you have any questions ([phone])  Thank you for the opportunity to share in the care of this patient  Impairments: abnormal gait, abnormal or restricted ROM, activity intolerance, impaired physical strength, lacks appropriate home exercise program and pain with function    Symptom irritability: moderateUnderstanding of Dx/Px/POC: good   Prognosis: good  Prognosis details: Positive prognostic indicators include positive attitude toward recovery, motivated to improve, high self-efficacy, good understanding of condition, realistic expectations  Negative prognostic indicators include chronicity of symptoms  Goals  STG to be met in 2 weeks:  - Increase (B) Hip strength by 1/2 MMT grade  - I with HEP   - Patient will be able to perform housework with minimal difficulty     LTG to be met in 4 weeks:  - Increase (B) Hip strength to 4+/5 all planes  - I with updated HEP   - Patient will be able to return to a regular exercise program without aggravating her lower back  Plan  Plan details: Prognosis above is given PT services 2x/week tapering to 1x/week over the next 4 weeks and home program adherence  Patient would benefit from: skilled physical therapy  Planned modality interventions: cryotherapy and thermotherapy: hydrocollator packs  Planned therapy interventions: activity modification, joint mobilization, manual therapy, neuromuscular re-education, patient education, postural training, strengthening, stretching, therapeutic exercise, therapeutic activities, functional ROM exercises, home exercise program, gait training and body mechanics training  Plan of Care beginning date: 2022  Plan of Care expiration date: 2022  Treatment plan discussed with: patient        Subjective Evaluation    History of Present Illness  Mechanism of injury: At Evaluation (2022): Patient reports chronic lower back pain for many years  Over the years she has managed her symptoms with medication, injections, massage, and PT  She recently had an increase in lower back pain - fell in April - did not hit her back  Red Flag Screen:  Patient denies recent fever, changes in bowel and bladder function, nausea and vomiting, weakness, unexplained weight loss, pain with coughing, or traumatic SAUL   Patient reports intermittent numbness in the legs       Greatest concern: being able to walk around without pain     Quality of life: good    Pain  Current pain ratin  At best pain ratin  At worst pain ratin  Location: Central lower back   Quality: dull ache  Alleviating factors: Activity modification, walking, standing, lying supine  Exacerbated by: Sitting for extended time; doing housework - vacuuming; bending forward      Social Support  Steps to enter house: yes  Stairs in house: no   Lives in: apartment  Lives with: alone    Employment status: not working    Diagnostic Tests    FCE comments: No recent imaging Treatments  Previous treatment: injection treatment, medication, physical therapy and massage  Patient Goals  Patient goal: Patient Specific Functional Scale: perform housework with less pain 0/10, increase activity level with exercise 0/10, return to regular walking for fitness 0/10; Total 0/30        Objective     Static Posture   General Observations  Symmetrical weight bearing  Lumbar Spine   Flattened  Palpation     Additional Palpation Details  TTP central lower lumbar spine     Active Range of Motion     Lumbar   Flexion:  WFL  Extension:  Restriction level: minimal    Additional Active Range of Motion Details  (B) SG: moderate restriction    Strength/Myotome Testing     Left Hip   Planes of Motion   Flexion: 4  Extension: 4  Abduction: 4    Right Hip   Planes of Motion   Flexion: 4  Extension: 4  Abduction: 4    Left Knee   Flexion: 5  Extension: 5    Right Knee   Flexion: 5  Extension: 5    Left Ankle/Foot   Dorsiflexion: 4+  Plantar flexion: 4+  Great toe extension: 5    Right Ankle/Foot   Dorsiflexion: 4+  Plantar flexion: 4+  Great toe extension: 5    Additional Strength Details  Hip Ext MMT performed in supine  Hip ABD MMT performed in hooklying     Tests     Lumbar   Negative SIJ compression and sacroiliac distraction  Left   Negative crossed SLR, passive SLR and slump test      Right   Negative crossed SLR, passive SLR and slump test      Left Hip   Positive CHELY and FADIR  Right Hip   Positive CHELY and FADIR  Additional Tests Details  (R) Hip IR AROM 5 degrees, (L) Hip IR AROM 10 degrees    Static Position Testing:   Hooklying position: Decreased pain  PPT with Hooklying position: Decreased pain       Ambulation     Observational Gait   Gait: antalgic   Decreased walking speed, left step length and right step length     Left foot contact pattern: foot flat  Right foot contact pattern: foot flat             Daily Treatment Diary     DX: Chronic LBP  EPOC: 8/25/22  Follow Up with Referring Provider: FABIANO  Precautions: NA  CO-MORBIDITIES: HTN, Osteoporosis   HEP ACCESS CODE: [id number]      Stage: chronic   Stability of Symptoms:  At Evaluation: stable - unchanged  Global Rating of Change:   Symptom Irritability Level: moderate  Primary Movement Diagnosis: poor motor control   Patient Specific Functional Scale:   7/28/22: perform housework with less pain 0/10, increase activity level with exercise 0/10, return to regular walking for fitness 0/10;  Total 0/30  FOTO Prediction: 61 by visit 11  FOTO Progress: 57 at evaluation   Greatest Concern: being able to walk around without pain   Current Activity Plan: added to HEP (7/28)  Current Educational Needs: progressions      Treatment Diary          Manual Therapy                                                                        Therapeutic Exercise  HEP         Recumbent Bike                    Abdominal Brace 7/28         LFS: Alt LE 7/28         H/L Hip ADD Isometric 7/28         BKFO 7/28                   SLR          TB Hip ABD Isometric          Bridge                    Neuromuscular Reeducation          Standing Hip Flex          Standing Hip ABD          Standing Hip Ext                              Foam Side Stepping In // Bars                                                            Therapeutic Activity                              Gait Training                                        Modalities

## 2022-08-04 ENCOUNTER — OFFICE VISIT (OUTPATIENT)
Dept: PHYSICAL THERAPY | Facility: CLINIC | Age: 87
End: 2022-08-04
Payer: MEDICARE

## 2022-08-04 DIAGNOSIS — M54.50 CHRONIC MIDLINE LOW BACK PAIN WITHOUT SCIATICA: Primary | ICD-10-CM

## 2022-08-04 DIAGNOSIS — G89.29 CHRONIC MIDLINE LOW BACK PAIN WITHOUT SCIATICA: Primary | ICD-10-CM

## 2022-08-04 PROCEDURE — 97112 NEUROMUSCULAR REEDUCATION: CPT | Performed by: PHYSICAL THERAPIST

## 2022-08-04 PROCEDURE — 97110 THERAPEUTIC EXERCISES: CPT | Performed by: PHYSICAL THERAPIST

## 2022-08-04 NOTE — PROGRESS NOTES
Daily Note     Today's date: 2022  Patient name: Norvin Duverney  : 1935  MRN: [id number]  Referring provider: Neeru Penny, PT  Dx:   Encounter Diagnosis     ICD-10-CM    1  Chronic midline low back pain without sciatica  M54 50     G89 29                   Subjective: Patient reports overall she is feeling better with exercises  She notes having some pain with bending to put out the garbage and to  the dog dishes  Objective: See treatment diary below      Assessment:   Stage: chronic   Stability of Symptoms:  At Evaluation: stable - unchanged  Global Rating of Change:   Symptom Irritability Level: moderate  Primary Movement Diagnosis: poor motor control   Patient Specific Functional Scale:   22: perform housework with less pain 0/10, increase activity level with exercise 0/10, return to regular walking for fitness 0/10; Total 0  FOTO Prediction: 61 by visit 11  FOTO Progress: 57 at evaluation   Greatest Concern: being able to walk around without pain   Current Activity Plan: added to HEP ()  Current Educational Needs: progressions    Patient had good tolerance to exercises  She demonstrated good form throughout treatment  She had some lightheadedness with transitioning from supine to sitting  She was challenged with exercises but noted no increased pain post treatment  Skilled PT continues to be required to guide progression of strength and control to allow her to return to doing housework and walking with minimal to no pain        Plan: Continue with POC - monitor tolerance to treatment - progress as able NV     Daily Treatment Diary     DX: Chronic LBP  EPOC: 22  Follow Up with Referring Provider: FABIANO  Precautions: NA  CO-MORBIDITIES: HTN, Osteoporosis   HEP ACCESS CODE: [id number]        Treatment Diary          Manual Therapy                                                                        Therapeutic Exercise  HEP         Recumbent Bike  5 min Abdominal Brace 7/28 5"x10        LFS: Alt LE 7/28 15x ea        H/L Hip ADD Isometric 7/28 5"x15        BKFO 7/28 15x ea                  SLR  10x ea        H/L TB Hip ABD   GTB  5"x15        Bridge  5"x10                  Neuromuscular Reeducation          Standing Hip Flex  15x ea        Standing Hip ABD  15x ea        Standing Hip Ext  15x ea                            Foam Side Stepping In // [employer]                                                            Therapeutic Activity                              Gait Training                                        Modalities

## 2022-08-11 ENCOUNTER — OFFICE VISIT (OUTPATIENT)
Dept: PHYSICAL THERAPY | Facility: CLINIC | Age: 87
End: 2022-08-11
Payer: MEDICARE

## 2022-08-11 DIAGNOSIS — G89.29 CHRONIC MIDLINE LOW BACK PAIN WITHOUT SCIATICA: Primary | ICD-10-CM

## 2022-08-11 DIAGNOSIS — M54.50 CHRONIC MIDLINE LOW BACK PAIN WITHOUT SCIATICA: Primary | ICD-10-CM

## 2022-08-11 PROCEDURE — 97110 THERAPEUTIC EXERCISES: CPT | Performed by: PHYSICAL THERAPIST

## 2022-08-11 PROCEDURE — 97112 NEUROMUSCULAR REEDUCATION: CPT | Performed by: PHYSICAL THERAPIST

## 2022-08-11 NOTE — PROGRESS NOTES
Daily Note     Today's date: 2022  Patient name: Doreen Malin  : 1935  MRN: [id number]  Referring provider: Ailyn Robert, PT  Dx:   Encounter Diagnosis     ICD-10-CM    1  Chronic midline low back pain without sciatica  M54 50     G89 29                   Subjective: Patient reports good tolerance to her LV  She reports her neck and back have bothered her a little bit but overall she is doing better  She notes picking up the dog bowl is getting easier  Objective: See treatment diary below      Assessment:   Stage: chronic   Stability of Symptoms:  At Evaluation: stable - unchanged  Global Rating of Change:   Symptom Irritability Level: moderate  Primary Movement Diagnosis: poor motor control   Patient Specific Functional Scale:   22: perform housework with less pain 0/10, increase activity level with exercise 0/10, return to regular walking for fitness 0/10; Total 0/30  FOTO Prediction: 61 by visit 11  FOTO Progress: 57 at evaluation   Greatest Concern: being able to walk around without pain   Current Activity Plan: added to HEP ()  Current Educational Needs: progressions    Patient continues to respond well to exercises - decreased pain with activity  She had no complaints of lightheadedness throughout treatment today  She is making good overall progress with PT  Skilled PT continues to be required to guide progression of strength and control to allow her to return to doing housework and walking with minimal to no pain        Plan: Continue with POC - monitor tolerance to treatment - progress as able NV     Daily Treatment Diary     DX: Chronic LBP  EPOC: 22  Follow Up with Referring Provider: FABIANO  Precautions: NA  CO-MORBIDITIES: HTN, Osteoporosis   HEP ACCESS CODE: [id number]        Treatment Diary         Manual Therapy                                                                        Therapeutic Exercise  HEP         Recumbent Bike  5 min 6 min Abdominal Brace 7/28 5"x10 5"x10       LFS: Alt LE 7/28 15x ea 20x ea       H/L Hip ADD Isometric 7/28 5"x15 5"x15       BKFO 7/28 15x ea 20x ea                 SLR  10x ea 20x ea       H/L TB Hip ABD   GTB  5"x15 GTB  5"x15       Bridge  5"x10 5"x15                 Neuromuscular Reeducation          Standing Hip Flex  15x ea 20x ea       Standing Hip ABD  15x ea 20x ea       Standing Hip Ext  15x ea 20x ea                           Foam Side Stepping In // Bars   6 feet  2 laps                                                         Therapeutic Activity                              Gait Training                                        Modalities

## 2022-08-18 ENCOUNTER — RA CDI HCC (OUTPATIENT)
Dept: OTHER | Facility: HOSPITAL | Age: 87
End: 2022-08-18

## 2022-08-18 ENCOUNTER — APPOINTMENT (OUTPATIENT)
Dept: PHYSICAL THERAPY | Facility: CLINIC | Age: 87
End: 2022-08-18
Payer: MEDICARE

## 2022-08-19 ENCOUNTER — TELEPHONE (OUTPATIENT)
Dept: INTERNAL MEDICINE CLINIC | Facility: CLINIC | Age: 87
End: 2022-08-19

## 2022-08-19 NOTE — TELEPHONE ENCOUNTER
Patient was in ED yesterday for severe pain in her back and chest on the right side  Patient states that ED was not sure if it was a pulled muscle or the start of shingles, told her to watch for a rash  They gave her 1 lidocaine patch  She is still in severe pain all the time and sometimes it shoots up  Can you order her something      Patient can be reached at [phone]

## 2022-08-22 ENCOUNTER — OFFICE VISIT (OUTPATIENT)
Dept: INTERNAL MEDICINE CLINIC | Facility: CLINIC | Age: 87
End: 2022-08-22
Payer: MEDICARE

## 2022-08-22 VITALS
BODY MASS INDEX: 29.57 KG/M2 | SYSTOLIC BLOOD PRESSURE: 142 MMHG | OXYGEN SATURATION: 97 % | WEIGHT: 184 LBS | DIASTOLIC BLOOD PRESSURE: 82 MMHG | TEMPERATURE: 98.9 F | HEIGHT: 66 IN | HEART RATE: 83 BPM

## 2022-08-22 DIAGNOSIS — B02.9 HERPES ZOSTER WITHOUT COMPLICATION: Primary | ICD-10-CM

## 2022-08-22 PROCEDURE — 99213 OFFICE O/P EST LOW 20 MIN: CPT | Performed by: INTERNAL MEDICINE

## 2022-08-22 RX ORDER — ACYCLOVIR 800 MG/1
800 TABLET ORAL 4 TIMES DAILY
Qty: 28 TABLET | Refills: 0 | Status: SHIPPED | OUTPATIENT
Start: 2022-08-22 | End: 2022-08-29

## 2022-08-22 RX ORDER — TRAMADOL HYDROCHLORIDE 50 MG/1
50 TABLET ORAL EVERY 6 HOURS PRN
Qty: 40 TABLET | Refills: 0 | Status: SHIPPED | OUTPATIENT
Start: 2022-08-22 | End: 2022-08-26

## 2022-08-22 NOTE — PROGRESS NOTES
Assessment/Plan:    Herpes zoster without complication  The patient began with right arm pain shoulder pain starting approximately 5 days ago  Was seen in urgent care facility  Diagnosis was possible muscle strain versus early herpes zoster and she was told to continue surveillance and look for rash which now has developed to her right arm anteriorly consistent with herpes zoster  She states the areas extremely painful  Has a slight rash also palmar surface of her right hand  She states the pain is excruciating and she is having difficulty sleeping  Even though it somewhat late we will start her on antiviral specifically acyclovir 800 mg 4 times a day for 7 days  Also was given a prescription for Ultram 50 mg every 6 hours as needed for her pain  She was told to keep in touch with the office about her condition and any changes  No evidence of secondary skin infection  We did have a description of shingles related to chickenpox that she had has a young child  Was told to stay away from pregnant women or any new point children because she would be contagious with the herpes virus       Diagnoses and all orders for this visit:    Herpes zoster without complication  -     acyclovir (ZOVIRAX) 800 mg tablet; Take 1 tablet (800 mg total) by mouth 4 (four) times a day for 7 days  -     traMADol (Ultram) 50 mg tablet; Take 1 tablet (50 mg total) by mouth every 6 (six) hours as needed for moderate pain or severe pain          Subjective:      Patient ID: Edil Casey is a 80 y o  female  Patient is an 80-year-old female history of medical problems as outlined previously who is here today for acute evaluation  Started with some pain to the right arm last week  Was seen in urgent care facility with no specific treatment  Has developed now rash to the anterior portion of her left arm, upper arm and chest wall    Extremely painful      The following portions of the patient's history were reviewed and updated as appropriate:   She  has a past medical history of Anxiety, GERD (gastroesophageal reflux disease), Hyperlipidemia, Hypertension, and Osteoarthritis  She   Patient Active Problem List    Diagnosis Date Noted    Herpes zoster without complication 18/37/2123    Cervical muscle pain 06/28/2022    Bronchitis 11/09/2021    Chronic bilateral low back pain without sciatica 06/23/2021    Medicare annual wellness visit, subsequent 10/24/2019    Age-related osteoporosis without current pathological fracture 06/05/2019    Acute cervical sprain, initial encounter 05/28/2019    Urinary hesitancy 03/22/2019    Class 1 obesity due to excess calories in adult 02/04/2019    Osteoporosis 08/14/2014    Hyperglycemia 07/25/2013    Hypertension 08/16/2012    Hyperlipidemia 08/16/2012    Anxiety 08/16/2012     She  has a past surgical history that includes Cataract extraction  Her family history includes Heart disease in her mother; Heart failure in her mother; Hypertension in her brother and father  She  reports that she has quit smoking  She has never used smokeless tobacco  She reports that she does not drink alcohol and does not use drugs    Current Outpatient Medications   Medication Sig Dispense Refill    acyclovir (ZOVIRAX) 800 mg tablet Take 1 tablet (800 mg total) by mouth 4 (four) times a day for 7 days 28 tablet 0    aspirin 81 mg chewable tablet Chew Twice daily      atorvastatin (LIPITOR) 10 mg tablet TAKE 1 TABLET DAILY 90 tablet 3    CALCIUM CARBONATE-VITAMIN D PO Take 1 tablet by mouth daily      citalopram (CeleXA) 40 mg tablet TAKE 1 TABLET DAILY 90 tablet 3    losartan-hydrochlorothiazide (HYZAAR) 100-25 MG per tablet TAKE 1 TABLET DAILY 90 tablet 3    potassium chloride (K-DUR,KLOR-CON) 10 mEq tablet TAKE 1 TABLET DAILY 90 tablet 3    traMADol (Ultram) 50 mg tablet Take 1 tablet (50 mg total) by mouth every 6 (six) hours as needed for moderate pain or severe pain 40 tablet 0     Current Facility-Administered Medications   Medication Dose Route Frequency Provider Last Rate Last Admin    denosumab (PROLIA) subcutaneous injection 60 mg  60 mg Subcutaneous Q6 Months Tito Brunson DO   60 mg at 06/28/22 1326     Current Outpatient Medications on File Prior to Visit   Medication Sig    aspirin 81 mg chewable tablet Chew Twice daily    atorvastatin (LIPITOR) 10 mg tablet TAKE 1 TABLET DAILY    CALCIUM CARBONATE-VITAMIN D PO Take 1 tablet by mouth daily    citalopram (CeleXA) 40 mg tablet TAKE 1 TABLET DAILY    losartan-hydrochlorothiazide (HYZAAR) 100-25 MG per tablet TAKE 1 TABLET DAILY    potassium chloride (K-DUR,KLOR-CON) 10 mEq tablet TAKE 1 TABLET DAILY     Current Facility-Administered Medications on File Prior to Visit   Medication    denosumab (PROLIA) subcutaneous injection 60 mg     She is allergic to ampicillin, sulfa antibiotics, and sulfamethoxazole-trimethoprim       Review of Systems   Constitutional: Positive for activity change (Has had some restriction with activity level secondary to arm pain)  Negative for appetite change, chills, diaphoresis, fatigue, fever and unexpected weight change  HENT: Negative  Eyes: Negative  Respiratory: Negative  Cardiovascular: Negative  Gastrointestinal: Negative  Endocrine: Negative  Genitourinary: Negative  Musculoskeletal: Positive for arthralgias and myalgias  Negative for back pain, gait problem, joint swelling, neck pain and neck stiffness  Skin: Positive for rash  Negative for color change, pallor and wound  Allergic/Immunologic: Negative  Neurological: Negative  Hematological: Negative  Psychiatric/Behavioral: Negative for agitation, behavioral problems, confusion, decreased concentration, dysphoric mood, hallucinations, self-injury, sleep disturbance and suicidal ideas  The patient is nervous/anxious ( very anxious secondary to pain)  The patient is not hyperactive            Objective:      /82 (BP Location: Left arm, Patient Position: Sitting)   Pulse 83   Temp 98 9 °F (37 2 °C) (Tympanic)   Ht 5' 6" (1 676 m)   Wt 83 5 kg (184 lb)   LMP  (LMP Unknown)   SpO2 97%   BMI 29 70 kg/m²          Physical Exam  Vitals and nursing note reviewed  Exam conducted with a chaperone present  Constitutional:       General: She is in acute distress  Appearance: Normal appearance  She is not ill-appearing, toxic-appearing or diaphoretic  Comments: Anxious, uncomfortable 80-year-old female who is awake alert  Accompanied by her daughter  HENT:      Head: Normocephalic and atraumatic  Nose: Nose normal       Mouth/Throat:      Mouth: Mucous membranes are moist       Pharynx: Oropharynx is clear  No oropharyngeal exudate or posterior oropharyngeal erythema  Eyes:      General: No scleral icterus  Right eye: No discharge  Extraocular Movements: Extraocular movements intact  Conjunctiva/sclera: Conjunctivae normal       Pupils: Pupils are equal, round, and reactive to light  Cardiovascular:      Rate and Rhythm: Normal rate and regular rhythm  Pulses: Normal pulses  Heart sounds: Normal heart sounds  Pulmonary:      Effort: Pulmonary effort is normal       Breath sounds: Normal breath sounds  Abdominal:      General: Bowel sounds are normal       Palpations: Abdomen is soft  Musculoskeletal:         General: Normal range of motion  Cervical back: Normal range of motion and neck supple  Skin:     Coloration: Skin is not jaundiced or pale  Findings: Rash (Lesions consistent with herpes zoster to anterior portion of her right upper arm  Similar rash to the palmar surface of her right hand  Extremely painful  Some vesicles and diffuse erythema in blotches) present  No bruising, erythema or lesion  Neurological:      Mental Status: She is alert and oriented to person, place, and time  Mental status is at baseline     Psychiatric:         Behavior: Behavior normal          Thought Content:  Thought content normal          Judgment: Judgment normal       Comments: Mildly anxious mood and affect

## 2022-08-22 NOTE — ASSESSMENT & PLAN NOTE
The patient began with right arm pain shoulder pain starting approximately 5 days ago  Was seen in urgent care facility  Diagnosis was possible muscle strain versus early herpes zoster and she was told to continue surveillance and look for rash which now has developed to her right arm anteriorly consistent with herpes zoster  She states the areas extremely painful  Has a slight rash also palmar surface of her right hand  She states the pain is excruciating and she is having difficulty sleeping  Even though it somewhat late we will start her on antiviral specifically acyclovir 800 mg 4 times a day for 7 days  Also was given a prescription for Ultram 50 mg every 6 hours as needed for her pain  She was told to keep in touch with the office about her condition and any changes  No evidence of secondary skin infection  We did have a description of shingles related to chickenpox that she had has a young child    Was told to stay away from pregnant women or any new [place] children because she would be contagious with the herpes virus

## 2022-08-25 ENCOUNTER — APPOINTMENT (OUTPATIENT)
Dept: PHYSICAL THERAPY | Facility: CLINIC | Age: 87
End: 2022-08-25
Payer: MEDICARE

## 2022-08-26 ENCOUNTER — TELEPHONE (OUTPATIENT)
Dept: INTERNAL MEDICINE CLINIC | Facility: CLINIC | Age: 87
End: 2022-08-26

## 2022-08-26 DIAGNOSIS — B02.9 HERPES ZOSTER WITHOUT COMPLICATION: Primary | ICD-10-CM

## 2022-08-26 RX ORDER — OXYCODONE HYDROCHLORIDE AND ACETAMINOPHEN 5; 325 MG/1; MG/1
1 TABLET ORAL EVERY 6 HOURS PRN
Qty: 40 TABLET | Refills: 0 | Status: SHIPPED | OUTPATIENT
Start: 2022-08-26 | End: 2022-09-09 | Stop reason: SDUPTHER

## 2022-08-26 NOTE — ASSESSMENT & PLAN NOTE
Patient is having intractable pain with her herpes zoster  She states the tramadol does not help and she also takes this with Advil  Is not getting any sleep uncomfortable in any position  I do not feel comfortable with this but we need to give her something stronger for her pain  Prescription for Percocet was sent to the pharmacy  She was told this may cause some upset stomach and possibly some constipation  May also cause some lightheadedness or and/or dizziness and nausea

## 2022-08-26 NOTE — TELEPHONE ENCOUNTER
Pt called back again  She is in a lot of pain and afraid to go through the weekend  Pt was in earlier this week and was diagnosed with shingles  Her script ends Sunday  She is still in a lot of pain and shes wandering if she should get an extension on her script or if there is something else she can do

## 2022-09-06 ENCOUNTER — RA CDI HCC (OUTPATIENT)
Dept: OTHER | Facility: HOSPITAL | Age: 87
End: 2022-09-06

## 2022-09-07 ENCOUNTER — APPOINTMENT (OUTPATIENT)
Dept: LAB | Facility: HOSPITAL | Age: 87
End: 2022-09-07
Payer: MEDICARE

## 2022-09-07 DIAGNOSIS — R73.9 HYPERGLYCEMIA: ICD-10-CM

## 2022-09-07 DIAGNOSIS — Z00.00 MEDICARE ANNUAL WELLNESS VISIT, SUBSEQUENT: ICD-10-CM

## 2022-09-07 LAB
ALBUMIN SERPL BCP-MCNC: 3.6 G/DL (ref 3.5–5)
ALP SERPL-CCNC: 47 U/L (ref 46–116)
ALT SERPL W P-5'-P-CCNC: 11 U/L (ref 12–78)
ANION GAP SERPL CALCULATED.3IONS-SCNC: 6 MMOL/L (ref 4–13)
AST SERPL W P-5'-P-CCNC: 16 U/L (ref 5–45)
BASOPHILS # BLD AUTO: 0.03 THOUSANDS/ΜL (ref 0–0.1)
BASOPHILS NFR BLD AUTO: 0 % (ref 0–1)
BILIRUB SERPL-MCNC: 0.67 MG/DL (ref 0.2–1)
BILIRUB UR QL STRIP: NEGATIVE
BUN SERPL-MCNC: 7 MG/DL (ref 5–25)
CALCIUM SERPL-MCNC: 9 MG/DL (ref 8.3–10.1)
CHLORIDE SERPL-SCNC: 97 MMOL/L (ref 96–108)
CHOLEST SERPL-MCNC: 127 MG/DL
CLARITY UR: CLEAR
CO2 SERPL-SCNC: 28 MMOL/L (ref 21–32)
COLOR UR: COLORLESS
CREAT SERPL-MCNC: 0.84 MG/DL (ref 0.6–1.3)
EOSINOPHIL # BLD AUTO: 0.04 THOUSAND/ΜL (ref 0–0.61)
EOSINOPHIL NFR BLD AUTO: 1 % (ref 0–6)
ERYTHROCYTE [DISTWIDTH] IN BLOOD BY AUTOMATED COUNT: 12 % (ref 11.6–15.1)
EST. AVERAGE GLUCOSE BLD GHB EST-MCNC: 111 MG/DL
GFR SERPL CREATININE-BSD FRML MDRD: 62 ML/MIN/1.73SQ M
GLUCOSE P FAST SERPL-MCNC: 102 MG/DL (ref 65–99)
GLUCOSE UR STRIP-MCNC: NEGATIVE MG/DL
HBA1C MFR BLD: 5.5 %
HCT VFR BLD AUTO: 35.1 % (ref 34.8–46.1)
HDLC SERPL-MCNC: 66 MG/DL
HGB BLD-MCNC: 11.6 G/DL (ref 11.5–15.4)
HGB UR QL STRIP.AUTO: NEGATIVE
IMM GRANULOCYTES # BLD AUTO: 0.03 THOUSAND/UL (ref 0–0.2)
IMM GRANULOCYTES NFR BLD AUTO: 0 % (ref 0–2)
KETONES UR STRIP-MCNC: NEGATIVE MG/DL
LDLC SERPL CALC-MCNC: 46 MG/DL (ref 0–100)
LEUKOCYTE ESTERASE UR QL STRIP: NEGATIVE
LYMPHOCYTES # BLD AUTO: 1.53 THOUSANDS/ΜL (ref 0.6–4.47)
LYMPHOCYTES NFR BLD AUTO: 18 % (ref 14–44)
MCH RBC QN AUTO: 32.2 PG (ref 26.8–34.3)
MCHC RBC AUTO-ENTMCNC: 33 G/DL (ref 31.4–37.4)
MCV RBC AUTO: 98 FL (ref 82–98)
MONOCYTES # BLD AUTO: 0.54 THOUSAND/ΜL (ref 0.17–1.22)
MONOCYTES NFR BLD AUTO: 6 % (ref 4–12)
NEUTROPHILS # BLD AUTO: 6.49 THOUSANDS/ΜL (ref 1.85–7.62)
NEUTS SEG NFR BLD AUTO: 75 % (ref 43–75)
NITRITE UR QL STRIP: NEGATIVE
NONHDLC SERPL-MCNC: 61 MG/DL
NRBC BLD AUTO-RTO: 0 /100 WBCS
PH UR STRIP.AUTO: 7.5 [PH]
PLATELET # BLD AUTO: 181 THOUSANDS/UL (ref 149–390)
PMV BLD AUTO: 10.2 FL (ref 8.9–12.7)
POTASSIUM SERPL-SCNC: 4.2 MMOL/L (ref 3.5–5.3)
PROT SERPL-MCNC: 6.9 G/DL (ref 6.4–8.4)
PROT UR STRIP-MCNC: NEGATIVE MG/DL
RBC # BLD AUTO: 3.6 MILLION/UL (ref 3.81–5.12)
SODIUM SERPL-SCNC: 131 MMOL/L (ref 135–147)
SP GR UR STRIP.AUTO: 1 (ref 1–1.03)
TRIGL SERPL-MCNC: 77 MG/DL
UROBILINOGEN UR STRIP-ACNC: <2 MG/DL
WBC # BLD AUTO: 8.66 THOUSAND/UL (ref 4.31–10.16)

## 2022-09-07 PROCEDURE — 80061 LIPID PANEL: CPT

## 2022-09-07 PROCEDURE — 81003 URINALYSIS AUTO W/O SCOPE: CPT

## 2022-09-07 PROCEDURE — 80053 COMPREHEN METABOLIC PANEL: CPT

## 2022-09-07 PROCEDURE — 36415 COLL VENOUS BLD VENIPUNCTURE: CPT

## 2022-09-07 PROCEDURE — 85025 COMPLETE CBC W/AUTO DIFF WBC: CPT

## 2022-09-07 PROCEDURE — 83036 HEMOGLOBIN GLYCOSYLATED A1C: CPT

## 2022-09-09 ENCOUNTER — OFFICE VISIT (OUTPATIENT)
Dept: INTERNAL MEDICINE CLINIC | Facility: CLINIC | Age: 87
End: 2022-09-09
Payer: MEDICARE

## 2022-09-09 VITALS
DIASTOLIC BLOOD PRESSURE: 80 MMHG | HEART RATE: 76 BPM | BODY MASS INDEX: 29.41 KG/M2 | TEMPERATURE: 97.2 F | RESPIRATION RATE: 18 BRPM | WEIGHT: 183 LBS | OXYGEN SATURATION: 98 % | HEIGHT: 66 IN | SYSTOLIC BLOOD PRESSURE: 140 MMHG

## 2022-09-09 DIAGNOSIS — E66.3 OVERWEIGHT: ICD-10-CM

## 2022-09-09 DIAGNOSIS — E78.01 FAMILIAL HYPERCHOLESTEROLEMIA: ICD-10-CM

## 2022-09-09 DIAGNOSIS — M81.0 AGE-RELATED OSTEOPOROSIS WITHOUT CURRENT PATHOLOGICAL FRACTURE: ICD-10-CM

## 2022-09-09 DIAGNOSIS — B02.9 HERPES ZOSTER WITHOUT COMPLICATION: Primary | ICD-10-CM

## 2022-09-09 DIAGNOSIS — G89.29 CHRONIC BILATERAL LOW BACK PAIN WITHOUT SCIATICA: ICD-10-CM

## 2022-09-09 DIAGNOSIS — Z00.00 MEDICARE ANNUAL WELLNESS VISIT, SUBSEQUENT: ICD-10-CM

## 2022-09-09 DIAGNOSIS — R73.9 HYPERGLYCEMIA: ICD-10-CM

## 2022-09-09 DIAGNOSIS — M54.50 CHRONIC BILATERAL LOW BACK PAIN WITHOUT SCIATICA: ICD-10-CM

## 2022-09-09 PROCEDURE — G0439 PPPS, SUBSEQ VISIT: HCPCS | Performed by: INTERNAL MEDICINE

## 2022-09-09 PROCEDURE — 99214 OFFICE O/P EST MOD 30 MIN: CPT | Performed by: INTERNAL MEDICINE

## 2022-09-09 RX ORDER — OXYCODONE HYDROCHLORIDE AND ACETAMINOPHEN 5; 325 MG/1; MG/1
1 TABLET ORAL EVERY 6 HOURS PRN
Qty: 40 TABLET | Refills: 0 | Status: SHIPPED | OUTPATIENT
Start: 2022-09-09 | End: 2022-10-04 | Stop reason: SDUPTHER

## 2022-09-09 NOTE — PROGRESS NOTES
Assessment and Plan:     Problem List Items Addressed This Visit    None          Preventive health issues were discussed with patient, and age appropriate screening tests were ordered as noted in patient's After Visit Summary  Personalized health advice and appropriate referrals for health education or preventive services given if needed, as noted in patient's After Visit Summary  History of Present Illness:     Patient presents for a Medicare Wellness Visit    HPI   Patient Care Team:  Clarita Brody DO as PCP - General     Review of Systems:     Review of Systems     Problem List:     Patient Active Problem List   Diagnosis    Hypertension    Hyperlipidemia    Hyperglycemia    Osteoporosis    Anxiety    Class 1 obesity due to excess calories in adult    Urinary hesitancy    Acute cervical sprain, initial encounter    Age-related osteoporosis without current pathological fracture    Medicare annual wellness visit, subsequent    Chronic bilateral low back pain without sciatica    Bronchitis    Cervical muscle pain    Herpes zoster without complication      Past Medical and Surgical History:     Past Medical History:   Diagnosis Date    Anxiety     GERD (gastroesophageal reflux disease)     Hyperlipidemia     Hypertension     Osteoarthritis      Past Surgical History:   Procedure Laterality Date    CATARACT EXTRACTION        Family History:     Family History   Problem Relation Age of Onset    Heart failure Mother     Heart disease Mother     Hypertension Father     Hypertension Brother       Social History:     Social History     Socioeconomic History    Marital status:       Spouse name: None    Number of children: None    Years of education: None    Highest education level: None   Occupational History    None   Tobacco Use    Smoking status: Former Smoker    Smokeless tobacco: Never Used   Substance and Sexual Activity    Alcohol use: No    Drug use: No    Sexual activity: None   Other Topics Concern    None   Social History Narrative    Daily Coffee Consumption (4 Cups/Day)     Social Determinants of Health     Financial Resource Strain: Not on file   Food Insecurity: Not on file   Transportation Needs: Not on file   Physical Activity: Not on file   Stress: Not on file   Social Connections: Not on file   Intimate Partner Violence: Not on file   Housing Stability: Not on file      Medications and Allergies:     Current Outpatient Medications   Medication Sig Dispense Refill    aspirin 81 mg chewable tablet Chew Twice daily      atorvastatin (LIPITOR) 10 mg tablet TAKE 1 TABLET DAILY 90 tablet 3    CALCIUM CARBONATE-VITAMIN D PO Take 1 tablet by mouth daily      citalopram (CeleXA) 40 mg tablet TAKE 1 TABLET DAILY 90 tablet 3    losartan-hydrochlorothiazide (HYZAAR) 100-25 MG per tablet TAKE 1 TABLET DAILY 90 tablet 3    oxyCODONE-acetaminophen (Percocet) 5-325 mg per tablet Take 1 tablet by mouth every 6 (six) hours as needed for moderate pain or severe pain Max Daily Amount: 4 tablets 40 tablet 0    potassium chloride (K-DUR,KLOR-CON) 10 mEq tablet TAKE 1 TABLET DAILY 90 tablet 3    acyclovir (ZOVIRAX) 800 mg tablet Take 1 tablet (800 mg total) by mouth 4 (four) times a day for 7 days 28 tablet 0     Current Facility-Administered Medications   Medication Dose Route Frequency Provider Last Rate Last Admin    denosumab (PROLIA) subcutaneous injection 60 mg  60 mg Subcutaneous Q6 Months Nadine Howard DO   60 mg at 06/28/22 1326     Allergies   Allergen Reactions    Ampicillin      Other reaction(s): Unknown Allergic Reaction  Reaction Date: [id number];     Sulfa Antibiotics      Other reaction(s): Unknown Allergic Reaction    Sulfamethoxazole-Trimethoprim      Reaction Date: [id number];       Immunizations:     Immunization History   Administered Date(s) Administered    COVID-19 PFIZER VACCINE 0 3 ML IM 02/10/2021, 03/03/2021, 11/29/2021    COVID-19 Pfizer vac (Kang-sucrose, gray cap) 12 yr+ IM 06/08/2022    DTaP 5 05/14/2014    INFLUENZA 11/08/2015    Influenza Quadrivalent, 6-35 Months IM 09/28/2017    Influenza Split High Dose Preservative Free IM 09/23/2013, 09/11/2014, 09/08/2016    Influenza, high dose seasonal 0 7 mL 10/01/2018, 10/24/2019, 10/22/2020, 11/09/2021    Influenza, seasonal, injectable 10/01/2010    Pneumococcal Conjugate 13-Valent 12/03/2014    Pneumococcal Polysaccharide PPV23 02/04/2019      Health Maintenance: There are no preventive care reminders to display for this patient  Topic Date Due    Influenza Vaccine (1) 09/01/2022      Medicare Screening Tests and Risk Assessments:     Eladia Camarena is here for her Subsequent Wellness visit  Last Medicare Wellness visit information reviewed, patient interviewed and updates made to the record today  Health Risk Assessment:   Patient rates overall health as fair  Patient feels that their physical health rating is same  Patient is very satisfied with their life  Eyesight was rated as slightly worse  Hearing was rated as same  Patient feels that their emotional and mental health rating is much better  Patients states they are never, rarely angry  Patient states they are never, rarely unusually tired/fatigued  Pain experienced in the last 7 days has been none  Patient states that she has experienced no weight loss or gain in last 6 months  Fall Risk Screening: In the past year, patient has experienced: history of falling in past year    Number of falls: 1  Injured during fall?: Yes    Feels unsteady when standing or walking?: No    Worried about falling?: No      Urinary Incontinence Screening:   Patient has not leaked urine accidently in the last six months  Home Safety:  Patient does not have trouble with stairs inside or outside of their home  Patient has working smoke alarms and has working carbon monoxide detector  Home safety hazards include: none       Nutrition:   Current diet is Regular  Medications:   Patient is not currently taking any over-the-counter supplements  Patient is able to manage medications  Activities of Daily Living (ADLs)/Instrumental Activities of Daily Living (IADLs):   Walk and transfer into and out of bed and chair?: Yes  Dress and groom yourself?: Yes    Bathe or shower yourself?: Yes    Feed yourself? Yes  Do your laundry/housekeeping?: Yes  Manage your money, pay your bills and track your expenses?: Yes  Make your own meals?: Yes    Do your own shopping?: Yes    Previous Hospitalizations:   Any hospitalizations or ED visits within the last 12 months?: No      Advance Care Planning:   Living will: Yes    Advanced directive: Yes    Five wishes given: No      PREVENTIVE SCREENINGS      Cardiovascular Screening:    General: Screening Not Indicated and History Lipid Disorder      Diabetes Screening:     General: Screening Current      Colorectal Cancer Screening:     General: Screening Not Indicated      Cervical Cancer Screening:    General: Screening Not Indicated      Osteoporosis Screening:    General: Screening Not Indicated and History Osteoporosis      Lung Cancer Screening:     General: Screening Not Indicated    Screening, Brief Intervention, and Referral to Treatment (SBIRT)    Screening  Typical number of drinks in a day: 0  Typical number of drinks in a week: 0  Interpretation: Low risk drinking behavior      AUDIT-C Screenin) How often did you have a drink containing alcohol in the past year? never  2) How many drinks did you have on a typical day when you were drinking in the past year? 0  3) How often did you have 6 or more drinks on one occasion in the past year? never    AUDIT-C Score: 0  Interpretation: Score 0-2 (female): Negative screen for alcohol misuse    Single Item Drug Screening:  How often have you used an illegal drug (including marijuana) or a prescription medication for non-medical reasons in the past year? never    Single Item Drug Screen Score: 0  Interpretation: Negative screen for possible drug use disorder    No exam data present     Physical Exam:     /80 (BP Location: Left arm, Patient Position: Sitting, Cuff Size: Adult)   Pulse 76   Temp (!) 97 2 °F (36 2 °C) (Tympanic)   Resp 18   Ht 5' 6" (1 676 m)   Wt 83 kg (183 lb)   LMP  (LMP Unknown)   SpO2 98%   BMI 29 54 kg/m²     Physical Exam     [place]urel[place] Bun, DO

## 2022-09-09 NOTE — ASSESSMENT & PLAN NOTE
24-year-old female with a history of extensive medical problems as outlined previously who is here today for repeat Medicare wellness visit  She did have labs performed prior to the visit today we did discuss the results of length the patient  Patient relates that she still having severe problems with this pain from recent episode of herpes zoster to her chest wall and arm on the right-hand side  She states the pain is chronic and she gets some relief with using the Percocet which she ran out of and went back to taking the tramadol along with Advil but only partial relief  She did undergo limited physical exam today with difficulty with body positioning secondary to her pain  She is complaining of some increased swelling to lower extremities with her being less active physically  At this point time patient was given were no for her Percocet dose otherwise will continue present medication and surveillance blood was told to keep us informed of her condition  As time goes by especially at nighttime would consider placing her on gabapentin that may help with some pain relief and also sleeping at night    Patient will be seen again in the office in 3 months will have labs performed prior to the visit and was told if any problems or concerns questions in between  to please call

## 2022-09-09 NOTE — ASSESSMENT & PLAN NOTE
With the patient's BMI she is considered overweight  Patient relates that she has had a substantial decrease in her appetite and p o  intake with her recent infection and pain from herpes zoster  Patient is had no suspect internal weight loss low since her last visit  She is handicapped as far as exercise concerned because of her pain    With her age in medical problems she is not truly a candidate for any intervention

## 2022-09-09 NOTE — PROGRESS NOTES
Assessment/Plan:    Medicare annual wellness visit, subsequent  80-year-old female with a history of extensive medical problems as outlined previously who is here today for repeat Medicare wellness visit  She did have labs performed prior to the visit today we did discuss the results of length the patient  Patient relates that she still having severe problems with this pain from recent episode of herpes zoster to her chest wall and arm on the right-hand side  She states the pain is chronic and she gets some relief with using the Percocet which she ran out of and went back to taking the tramadol along with Advil but only partial relief  She did undergo limited physical exam today with difficulty with body positioning secondary to her pain  She is complaining of some increased swelling to lower extremities with her being less active physically  At this point time patient was given were no for her Percocet dose otherwise will continue present medication and surveillance blood was told to keep us informed of her condition  As time goes by especially at nighttime would consider placing her on gabapentin that may help with some pain relief and also sleeping at night  Patient will be seen again in the office in 3 months will have labs performed prior to the visit and was told if any problems or concerns questions in between  to please call    Herpes zoster without complication  The patient states that she still having severe pain to the area of her herpes zoster infection, right arm and chest wall  As noted patient is taking Percocet which she states is helping  Disruption of her sleep pattern  Will continue to keep us informed of her situation and again is noted may be trying to place her on some gabapentin at nighttime to see if this helps with her pain and sleep but she will contact us next week    Hyperglycemia  Elevated blood sugar readings but hemoglobin A1c shows no evidence of diabetes    Patient states that with her pain and disability now her appetite is off  We will continue to monitor blood sugar readings and initiate medication if needed in the future  Hyperlipidemia  History of hyperlipidemia  Patient remains on atorvastatin 10 mg daily  Cholesterol is stable  Will continue present medication and surveillance  Was instructed again to watch her intake of fats and cholesterol with her diet  Osteoporosis  Patient continues to get Prolia injections  We will continue to monitor her bone density and modify treatment if necessary  Is encouraged to continue to take her calcium and vitamin-D    Chronic bilateral low back pain without sciatica  Had been doing well with physical therapy but this is been  Because of her pain from herpes zoster  Once the patient is feeling better will return to physical therapy for treatment which she states had been effective  Overweight  With the patient's BMI she is considered overweight  Patient relates that she has had a substantial decrease in her appetite and p o  intake with her recent infection and pain from herpes zoster  Patient is had no suspect internal weight loss low since her last visit  She is handicapped as far as exercise concerned because of her pain  With her age in medical problems she is not truly a candidate for any intervention       Diagnoses and all orders for this visit:    Herpes zoster without complication  -     oxyCODONE-acetaminophen (Percocet) 5-325 mg per tablet; Take 1 tablet by mouth every 6 (six) hours as needed for moderate pain or severe pain Max Daily Amount: 4 tablets    Medicare annual wellness visit, subsequent    Hyperglycemia    Familial hypercholesterolemia    Age-related osteoporosis without current pathological fracture    Chronic bilateral low back pain without sciatica    Overweight          Subjective:      Patient ID: Kris Wesley is a 80 y o  female      Patient is a 79-year-old female history of medical problems as outlined previously who is here today for repeat Medicare wellness visit  Patient did have labs performed prior to the visit today we did discuss the results length with the patient  Patient is still suffering from severe pain from herpes zoster infection right arm and chest wall  No substantial relief in her discomfort with narcotic analgesics      The following portions of the patient's history were reviewed and updated as appropriate: She  has a past medical history of Anxiety, GERD (gastroesophageal reflux disease), Hyperlipidemia, Hypertension, and Osteoarthritis  She   Patient Active Problem List    Diagnosis Date Noted    Overweight 09/09/2022    Herpes zoster without complication 19/30/7200    Cervical muscle pain 06/28/2022    Bronchitis 11/09/2021    Chronic bilateral low back pain without sciatica 06/23/2021    Medicare annual wellness visit, subsequent 10/24/2019    Age-related osteoporosis without current pathological fracture 06/05/2019    Acute cervical sprain, initial encounter 05/28/2019    Urinary hesitancy 03/22/2019    Class 1 obesity due to excess calories in adult 02/04/2019    Osteoporosis 08/14/2014    Hyperglycemia 07/25/2013    Hypertension 08/16/2012    Hyperlipidemia 08/16/2012    Anxiety 08/16/2012     She  has a past surgical history that includes Cataract extraction  Her family history includes Heart disease in her mother; Heart failure in her mother; Hypertension in her brother and father  She  reports that she has quit smoking  She has never used smokeless tobacco  She reports that she does not drink alcohol and does not use drugs    Current Outpatient Medications   Medication Sig Dispense Refill    aspirin 81 mg chewable tablet Chew Twice daily      atorvastatin (LIPITOR) 10 mg tablet TAKE 1 TABLET DAILY 90 tablet 3    CALCIUM CARBONATE-VITAMIN D PO Take 1 tablet by mouth daily      citalopram (CeleXA) 40 mg tablet TAKE 1 TABLET DAILY 90 tablet 3    losartan-hydrochlorothiazide (HYZAAR) 100-25 MG per tablet TAKE 1 TABLET DAILY 90 tablet 3    oxyCODONE-acetaminophen (Percocet) 5-325 mg per tablet Take 1 tablet by mouth every 6 (six) hours as needed for moderate pain or severe pain Max Daily Amount: 4 tablets 40 tablet 0    potassium chloride (K-DUR,KLOR-CON) 10 mEq tablet TAKE 1 TABLET DAILY 90 tablet 3    acyclovir (ZOVIRAX) 800 mg tablet Take 1 tablet (800 mg total) by mouth 4 (four) times a day for 7 days 28 tablet 0     Current Facility-Administered Medications   Medication Dose Route Frequency Provider Last Rate Last Admin    denosumab (PROLIA) subcutaneous injection 60 mg  60 mg Subcutaneous Q6 Months Tre Kirby DO   60 mg at 06/28/22 1326     Current Outpatient Medications on File Prior to Visit   Medication Sig    aspirin 81 mg chewable tablet Chew Twice daily    atorvastatin (LIPITOR) 10 mg tablet TAKE 1 TABLET DAILY    CALCIUM CARBONATE-VITAMIN D PO Take 1 tablet by mouth daily    citalopram (CeleXA) 40 mg tablet TAKE 1 TABLET DAILY    losartan-hydrochlorothiazide (HYZAAR) 100-25 MG per tablet TAKE 1 TABLET DAILY    potassium chloride (K-DUR,KLOR-CON) 10 mEq tablet TAKE 1 TABLET DAILY    [DISCONTINUED] oxyCODONE-acetaminophen (Percocet) 5-325 mg per tablet Take 1 tablet by mouth every 6 (six) hours as needed for moderate pain or severe pain Max Daily Amount: 4 tablets    acyclovir (ZOVIRAX) 800 mg tablet Take 1 tablet (800 mg total) by mouth 4 (four) times a day for 7 days     Current Facility-Administered Medications on File Prior to Visit   Medication    denosumab (PROLIA) subcutaneous injection 60 mg     She is allergic to ampicillin, sulfa antibiotics, and sulfamethoxazole-trimethoprim       Review of Systems   Constitutional: Positive for activity change (Severely restricted with activity level secondary to her pain ), appetite change ( decreased appetite) and fatigue ( some fatigue because of disruption of her sleep patterns)  Negative for chills, diaphoresis, fever and unexpected weight change  HENT: Negative  Eyes: Negative  Respiratory: Negative  Cardiovascular: Positive for chest pain ( chest wall pain on the right secondary to herpes zoster and neuralgia) and leg swelling ( some increased swelling bilateral lower extremities)  Negative for palpitations  Gastrointestinal: Positive for constipation ( was having problems with constipation with taking Percocet  States that she has relieved by drinking prune juice in eating prunes)  Negative for abdominal distention, abdominal pain, anal bleeding, blood in stool, diarrhea, nausea, rectal pain and vomiting  Endocrine: Negative  Genitourinary: Negative  Musculoskeletal: Positive for arthralgias, back pain and gait problem  Negative for joint swelling, myalgias, neck pain and neck stiffness  Skin: Negative  Allergic/Immunologic: Negative  Neurological: Negative for dizziness, tremors, seizures, syncope, facial asymmetry, speech difficulty, weakness, light-headedness, numbness and headaches  Hematological: Negative  Psychiatric/Behavioral: Negative  Objective:      /80 (BP Location: Left arm, Patient Position: Sitting, Cuff Size: Adult)   Pulse 76   Temp (!) 97 2 °F (36 2 °C) (Tympanic)   Resp 18   Ht 5' 6" (1 676 m)   Wt 83 kg (183 lb)   LMP  (LMP Unknown)   SpO2 98%   BMI 29 54 kg/m²          Physical Exam  Vitals and nursing note reviewed  Constitutional:       General: She is not in acute distress  Appearance: Normal appearance  She is not ill-appearing, toxic-appearing or diaphoretic  Comments: Moderately anxious some comfortable 40-year-old female who is awake alert, overweight   HENT:      Head: Normocephalic and atraumatic  Right Ear: Tympanic membrane, ear canal and external ear normal  There is no impacted cerumen        Left Ear: Tympanic membrane, ear canal and external ear normal  There is no impacted cerumen  Nose: Nose normal  No congestion or rhinorrhea  Mouth/Throat:      Mouth: Mucous membranes are moist       Pharynx: Oropharynx is clear  No oropharyngeal exudate or posterior oropharyngeal erythema  Eyes:      General: No scleral icterus  Right eye: No discharge  Left eye: No discharge  Extraocular Movements: Extraocular movements intact  Conjunctiva/sclera: Conjunctivae normal       Pupils: Pupils are equal, round, and reactive to light  Neck:      Vascular: No carotid bruit  Cardiovascular:      Rate and Rhythm: Normal rate and regular rhythm  Heart sounds: Normal heart sounds  No murmur heard  No friction rub  No gallop  Comments: Unable to fully palpate peripheral pulses to lower extremity secondary to edema  Pulmonary:      Effort: Pulmonary effort is normal  No respiratory distress  Breath sounds: Normal breath sounds  No stridor  No wheezing, rhonchi or rales  Comments: Patient has some splinting when taking the stressed because the pain from herpes zoster right chest wall  Chest:      Chest wall: Tenderness (Patient does have exquisite tenderness to skin surface chest wall anteriorly on the right) present  Abdominal:      General: Bowel sounds are normal  There is no distension  Palpations: Abdomen is soft  There is no mass  Tenderness: There is no abdominal tenderness  There is no right CVA tenderness, left CVA tenderness, guarding or rebound  Hernia: No hernia is present  Musculoskeletal:         General: No swelling, tenderness, deformity or signs of injury  Cervical back: Normal range of motion and neck supple  No rigidity or tenderness  Right lower leg: Edema ( +1 pitting edema bilateral lower extremities which is slightly increased from last visit) present  Left lower leg: Edema present  Lymphadenopathy:      Cervical: No cervical adenopathy  Skin:     General: Skin is warm and dry  Coloration: Skin is not jaundiced or pale  Findings: Rash ( patient has some clearing of her rash from the herpes zoster right arm right chest wall anteriorly  Patient has no secondary infection ) present  No bruising, erythema or lesion  Neurological:      Mental Status: She is alert and oriented to person, place, and time  Mental status is at baseline  Cranial Nerves: No cranial nerve deficit  Sensory: No sensory deficit  Motor: No weakness  Coordination: Coordination normal       Gait: Gait normal       Deep Tendon Reflexes: Reflexes normal       Comments: Severe paresthesia to her right arm chest wall rate from herpes zoster  Psychiatric:         Behavior: Behavior normal          Thought Content: Thought content normal          Judgment: Judgment normal       Comments: Mildly anxious mood and affect         BMI Counseling: Body mass index is 29 54 kg/m²  The BMI is above normal  No BMI follow-up plan is appropriate  Patient is 72 years old and weight reduction/weight gain would further complicate their underlying illness

## 2022-09-09 NOTE — ASSESSMENT & PLAN NOTE
Had been doing well with physical therapy but this is been  Because of her pain from herpes zoster  Once the patient is feeling better will return to physical therapy for treatment which she states had been effective

## 2022-09-09 NOTE — ASSESSMENT & PLAN NOTE
The patient states that she still having severe pain to the area of her herpes zoster infection, right arm and chest wall  As noted patient is taking Percocet which she states is helping  Disruption of her sleep pattern    Will continue to keep us informed of her situation and again is noted may be trying to place her on some gabapentin at nighttime to see if this helps with her pain and sleep but she will contact us next week

## 2022-09-09 NOTE — ASSESSMENT & PLAN NOTE
Elevated blood sugar readings but hemoglobin A1c shows no evidence of diabetes  Patient states that with her pain and disability now her appetite is off  We will continue to monitor blood sugar readings and initiate medication if needed in the future

## 2022-09-09 NOTE — ASSESSMENT & PLAN NOTE
History of hyperlipidemia  Patient remains on atorvastatin 10 mg daily  Cholesterol is stable  Will continue present medication and surveillance  Was instructed again to watch her intake of fats and cholesterol with her diet

## 2022-09-09 NOTE — ASSESSMENT & PLAN NOTE
Patient continues to get Prolia injections  We will continue to monitor her bone density and modify treatment if necessary    Is encouraged to continue to take her calcium and vitamin-D

## 2022-09-16 ENCOUNTER — TELEPHONE (OUTPATIENT)
Dept: INTERNAL MEDICINE CLINIC | Facility: CLINIC | Age: 87
End: 2022-09-16

## 2022-09-23 DIAGNOSIS — F41.9 ANXIETY: ICD-10-CM

## 2022-09-23 DIAGNOSIS — I10 ESSENTIAL HYPERTENSION: ICD-10-CM

## 2022-09-23 RX ORDER — LOSARTAN POTASSIUM AND HYDROCHLOROTHIAZIDE 25; 100 MG/1; MG/1
TABLET ORAL
Qty: 90 TABLET | Refills: 0 | Status: SHIPPED | OUTPATIENT
Start: 2022-09-23

## 2022-09-23 RX ORDER — CITALOPRAM 40 MG/1
TABLET ORAL
Qty: 90 TABLET | Refills: 0 | Status: SHIPPED | OUTPATIENT
Start: 2022-09-23

## 2022-09-23 RX ORDER — POTASSIUM CHLORIDE 750 MG/1
TABLET, EXTENDED RELEASE ORAL
Qty: 90 TABLET | Refills: 0 | Status: SHIPPED | OUTPATIENT
Start: 2022-09-23

## 2022-10-04 DIAGNOSIS — B02.9 HERPES ZOSTER WITHOUT COMPLICATION: ICD-10-CM

## 2022-10-04 RX ORDER — OXYCODONE HYDROCHLORIDE AND ACETAMINOPHEN 5; 325 MG/1; MG/1
1 TABLET ORAL EVERY 6 HOURS PRN
Qty: 40 TABLET | Refills: 0 | Status: SHIPPED | OUTPATIENT
Start: 2022-10-04

## 2022-10-24 NOTE — ASSESSMENT & PLAN NOTE
The patient does have a longstanding history of hypertension  She remains on losartan with hydrochlorothiazide and because of low potassium levels potassium supplement  As noted patient's blood pressure is under acceptable control today in the office  Patient will continue present medication  Prior to the visit today we would also checked on the patient's renal function and potassium which are stable  Tremfya Counseling: I discussed with the patient the risks of guselkumab including but not limited to immunosuppression, serious infections, and drug reactions.  The patient understands that monitoring is required including a PPD at baseline and must alert us or the primary physician if symptoms of infection or other concerning signs are noted.

## 2022-12-14 ENCOUNTER — OFFICE VISIT (OUTPATIENT)
Dept: INTERNAL MEDICINE CLINIC | Facility: CLINIC | Age: 87
End: 2022-12-14

## 2022-12-14 VITALS
RESPIRATION RATE: 18 BRPM | BODY MASS INDEX: 29.09 KG/M2 | SYSTOLIC BLOOD PRESSURE: 142 MMHG | TEMPERATURE: 98 F | HEART RATE: 75 BPM | DIASTOLIC BLOOD PRESSURE: 83 MMHG | WEIGHT: 181 LBS | HEIGHT: 66 IN | OXYGEN SATURATION: 97 %

## 2022-12-14 DIAGNOSIS — R73.9 HYPERGLYCEMIA: Primary | ICD-10-CM

## 2022-12-14 DIAGNOSIS — I10 PRIMARY HYPERTENSION: ICD-10-CM

## 2022-12-14 DIAGNOSIS — B02.9 HERPES ZOSTER WITHOUT COMPLICATION: ICD-10-CM

## 2022-12-14 DIAGNOSIS — M81.0 AGE-RELATED OSTEOPOROSIS WITHOUT CURRENT PATHOLOGICAL FRACTURE: ICD-10-CM

## 2022-12-14 DIAGNOSIS — E78.01 FAMILIAL HYPERCHOLESTEROLEMIA: ICD-10-CM

## 2022-12-14 NOTE — ASSESSMENT & PLAN NOTE
History of hyperlipidemia  She remains on atorvastatin 10 mg daily  She relates that she is not always peripheral with her diet we reinforced the importance of watching her intake of fats and cholesterol with her diet    Patient will continue present treatment and we will continue to monitor her cholesterol level which will be checked with her next visit in the spring

## 2022-12-14 NOTE — ASSESSMENT & PLAN NOTE
Patient did have labs performed prior to visit and renal function is stable  Her blood pressure is showing adequate control with present treatment  Patient will continue present medication and surveillance and we will make adjustment to treatment in the future if needed

## 2022-12-14 NOTE — PROGRESS NOTES
Name: Karolyn Valdez      : 1935      MRN: [id number]  Encounter Provider: Adithya Newton DO  Encounter Date: 2022   Encounter department: [facility]    Assessment & Plan     1  Hyperglycemia  Assessment & Plan:  Has a history of elevated blood sugar readings but hemoglobin A1c is not in the diabetic range  Again as previously we have discussed with the patient the importance of diet, watching her intake not only of concentrated sweets and simple carbohydrates but also looking at her total caloric intake in order to keep her sugar under control  Check a fasting blood sugar and hemoglobin A1c with her next visit  Orders:  -     Hemoglobin A1C; Future    2  Age-related osteoporosis without current pathological fracture  Assessment & Plan:  History of osteoporosis  She remains on Prolia and will receive injection today  Patient has had no side effects from the medication  Along with Prolia patient is taking her calcium and vitamin D  Orders:  -     denosumab (PROLIA) subcutaneous injection 60 mg    3  Familial hypercholesterolemia  Assessment & Plan:  History of hyperlipidemia  She remains on atorvastatin 10 mg daily  She relates that she is not always peripheral with her diet we reinforced the importance of watching her intake of fats and cholesterol with her diet  Patient will continue present treatment and we will continue to monitor her cholesterol level which will be checked with her next visit in the spring    Orders:  -     Lipid panel; Future    4  Primary hypertension  Assessment & Plan:  Patient did have labs performed prior to visit and renal function is stable  Her blood pressure is showing adequate control with present treatment  Patient will continue present medication and surveillance and we will make adjustment to treatment in the future if needed  Orders:  -     Basic metabolic panel; Future    5   Herpes zoster without complication  Assessment & Plan:  Patient this year did have a severe case of herpes zoster  Severe pain right sided right arm and right chest wall  Patient states the pain has subsided  She keeps her Percocet on hand to take as needed but states usually will just take some Advil or Aleve to help with pain  Subjective     Patient is a an 45-year-old female history of medical problems as outlined previously who is here today for routine follow-up  She did have labs performed prior to the visit today and we did discuss the results  Patient states in general doing relatively well  Less pain from previous herpes zoster infection  Will be receiving Prolia injection today in the office  Denies chest pain or pressure no increasing shortness of breath  Chronic swelling to the feet and ankles    Review of Systems   Constitutional: Negative  HENT: Negative  Eyes: Negative  Respiratory: Negative  Cardiovascular: Positive for leg swelling  Negative for chest pain and palpitations  Gastrointestinal: Negative  Endocrine: Negative  Genitourinary: Negative  Musculoskeletal: Positive for back pain  Negative for arthralgias, gait problem, joint swelling, myalgias, neck pain and neck stiffness  Skin: Negative  Allergic/Immunologic: Negative  Neurological: Negative  Hematological: Negative  Psychiatric/Behavioral: Negative  Past Medical History:   Diagnosis Date   • Anxiety    • GERD (gastroesophageal reflux disease)    • Hyperlipidemia    • Hypertension    • Osteoarthritis      Past Surgical History:   Procedure Laterality Date   • CATARACT EXTRACTION       Family History   Problem Relation Age of Onset   • Heart failure Mother    • Heart disease Mother    • Hypertension Father    • Hypertension Brother      Social History     Socioeconomic History   • Marital status:       Spouse name: None   • Number of children: None   • Years of education: None   • Highest education level: None   Occupational History   • None   Tobacco Use   • Smoking status: Former   • Smokeless tobacco: Never   Substance and Sexual Activity   • Alcohol use: No   • Drug use: No   • Sexual activity: None   Other Topics Concern   • None   Social History Narrative    Daily Coffee Consumption (4 Cups/Day)     Social Determinants of Health     Financial Resource Strain: Low Risk    • Difficulty of Paying Living Expenses: Not very hard   Food Insecurity: Not on file   Transportation Needs: No Transportation Needs   • Lack of Transportation (Medical): No   • Lack of Transportation (Non-Medical):  No   Physical Activity: Not on file   Stress: Not on file   Social Connections: Not on file   Intimate Partner Violence: Not on file   Housing Stability: Not on file     Current Outpatient Medications on File Prior to Visit   Medication Sig   • aspirin 81 mg chewable tablet Chew Twice daily   • atorvastatin (LIPITOR) 10 mg tablet TAKE 1 TABLET DAILY   • CALCIUM CARBONATE-VITAMIN D PO Take 1 tablet by mouth daily   • citalopram (CeleXA) 40 mg tablet TAKE 1 TABLET DAILY   • losartan-hydrochlorothiazide (HYZAAR) 100-25 MG per tablet TAKE 1 TABLET DAILY   • oxyCODONE-acetaminophen (Percocet) 5-325 mg per tablet Take 1 tablet by mouth every 6 (six) hours as needed for moderate pain or severe pain Max Daily Amount: 4 tablets   • potassium chloride (K-DUR,KLOR-CON) 10 mEq tablet TAKE 1 TABLET DAILY   • acyclovir (ZOVIRAX) 800 mg tablet Take 1 tablet (800 mg total) by mouth 4 (four) times a day for 7 days     Allergies   Allergen Reactions   • Ampicillin      Other reaction(s): Unknown Allergic Reaction  Reaction Date: [id number];    • Sulfa Antibiotics      Other reaction(s): Unknown Allergic Reaction   • Sulfamethoxazole-Trimethoprim      Reaction Date: [id number];      Immunization History   Administered Date(s) Administered   • COVID-19 PFIZER VACCINE 0 3 ML IM 02/10/2021, 03/03/2021, 11/29/2021   • Khris 78 vac (Kang-sucrose, gray cap) 12 yr+ IM 06/08/2022   • DTaP 5 05/14/2014   • INFLUENZA 11/08/2015   • Influenza Quadrivalent, 6-35 Months IM 09/28/2017   • Influenza Split High Dose Preservative Free IM 09/23/2013, 09/11/2014, 09/08/2016   • Influenza, high dose seasonal 0 7 mL 10/01/2018, 10/24/2019, 10/22/2020, 11/09/2021   • Influenza, seasonal, injectable 10/01/2010   • Pneumococcal Conjugate 13-Valent 12/03/2014   • Pneumococcal Polysaccharide PPV23 02/04/2019       Objective     /83 (BP Location: Left arm, Patient Position: Sitting, Cuff Size: Adult)   Pulse 75   Temp 98 °F (36 7 °C) (Tympanic)   Resp 18   Ht 5' 6" (1 676 m)   Wt 82 1 kg (181 lb)   LMP  (LMP Unknown)   SpO2 97%   BMI 29 21 kg/m²     Physical Exam  Vitals and nursing note reviewed  Constitutional:       General: She is not in acute distress  Appearance: Normal appearance  She is not ill-appearing, toxic-appearing or diaphoretic  Comments: Pleasant, cheerful, articulate, overweight 51-year-old female who is awake alert in no acute distress and oriented x3   HENT:      Head: Normocephalic and atraumatic  Right Ear: Tympanic membrane, ear canal and external ear normal  There is no impacted cerumen  Left Ear: Tympanic membrane, ear canal and external ear normal  There is no impacted cerumen  Nose: Nose normal  No congestion or rhinorrhea  Mouth/Throat:      Mouth: Mucous membranes are moist       Pharynx: Oropharynx is clear  No oropharyngeal exudate or posterior oropharyngeal erythema  Eyes:      General: No scleral icterus  Right eye: No discharge  Left eye: No discharge  Extraocular Movements: Extraocular movements intact  Conjunctiva/sclera: Conjunctivae normal       Pupils: Pupils are equal, round, and reactive to light  Neck:      Vascular: No carotid bruit  Cardiovascular:      Rate and Rhythm: Normal rate and regular rhythm  Heart sounds: Normal heart sounds   No murmur heard  No friction rub  No gallop  Pulmonary:      Effort: Pulmonary effort is normal  No respiratory distress  Breath sounds: Normal breath sounds  No stridor  No wheezing, rhonchi or rales  Chest:      Chest wall: No tenderness  Abdominal:      General: Bowel sounds are normal  There is no distension  Palpations: Abdomen is soft  There is no mass  Tenderness: There is no abdominal tenderness  There is no right CVA tenderness, left CVA tenderness, guarding or rebound  Hernia: No hernia is present  Musculoskeletal:         General: No swelling, tenderness, deformity or signs of injury  Cervical back: Normal range of motion and neck supple  No rigidity or tenderness  Right lower leg: Edema present  Left lower leg: Edema present  Comments: +1 type edema bilateral lower extremities   Lymphadenopathy:      Cervical: No cervical adenopathy  Skin:     General: Skin is warm and dry  Capillary Refill: Capillary refill takes less than 2 seconds  Coloration: Skin is not jaundiced or pale  Findings: No bruising, erythema, lesion or rash  Neurological:      General: No focal deficit present  Mental Status: She is alert and oriented to person, place, and time  Mental status is at baseline  Cranial Nerves: No cranial nerve deficit  Sensory: No sensory deficit  Motor: No weakness  Coordination: Coordination normal       Gait: Gait normal    Psychiatric:         Mood and Affect: Mood normal          Behavior: Behavior normal          Thought Content:  Thought content normal          Judgment: Judgment normal        Tre Kirby DO

## 2022-12-14 NOTE — ASSESSMENT & PLAN NOTE
Patient this year did have a severe case of herpes zoster  Severe pain right sided right arm and right chest wall  Patient states the pain has subsided  She keeps her Percocet on hand to take as needed but states usually will just take some Advil or Aleve to help with pain

## 2022-12-14 NOTE — ASSESSMENT & PLAN NOTE
Has a history of elevated blood sugar readings but hemoglobin A1c is not in the diabetic range  Again as previously we have discussed with the patient the importance of diet, watching her intake not only of concentrated sweets and simple carbohydrates but also looking at her total caloric intake in order to keep her sugar under control  Check a fasting blood sugar and hemoglobin A1c with her next visit

## 2022-12-14 NOTE — ASSESSMENT & PLAN NOTE
History of osteoporosis  She remains on Prolia and will receive injection today  Patient has had no side effects from the medication    Along with Prolia patient is taking her calcium and vitamin D

## 2022-12-22 DIAGNOSIS — F41.9 ANXIETY: ICD-10-CM

## 2022-12-22 DIAGNOSIS — I10 ESSENTIAL HYPERTENSION: ICD-10-CM

## 2022-12-22 RX ORDER — CITALOPRAM 40 MG/1
TABLET ORAL
Qty: 90 TABLET | Refills: 3 | Status: SHIPPED | OUTPATIENT
Start: 2022-12-22

## 2022-12-22 RX ORDER — POTASSIUM CHLORIDE 750 MG/1
TABLET, EXTENDED RELEASE ORAL
Qty: 90 TABLET | Refills: 3 | Status: SHIPPED | OUTPATIENT
Start: 2022-12-22

## 2022-12-26 RX ORDER — LOSARTAN POTASSIUM AND HYDROCHLOROTHIAZIDE 25; 100 MG/1; MG/1
TABLET ORAL
Qty: 90 TABLET | Refills: 3 | Status: SHIPPED | OUTPATIENT
Start: 2022-12-26

## 2022-12-27 DIAGNOSIS — E78.00 PURE HYPERCHOLESTEROLEMIA: ICD-10-CM

## 2022-12-27 RX ORDER — ATORVASTATIN CALCIUM 10 MG/1
10 TABLET, FILM COATED ORAL DAILY
Qty: 90 TABLET | Refills: 3 | Status: SHIPPED | OUTPATIENT
Start: 2022-12-27

## 2023-03-28 ENCOUNTER — OFFICE VISIT (OUTPATIENT)
Dept: INTERNAL MEDICINE CLINIC | Facility: CLINIC | Age: 88
End: 2023-03-28

## 2023-03-28 VITALS
WEIGHT: 178.4 LBS | OXYGEN SATURATION: 98 % | HEART RATE: 81 BPM | TEMPERATURE: 99 F | HEIGHT: 66 IN | DIASTOLIC BLOOD PRESSURE: 80 MMHG | SYSTOLIC BLOOD PRESSURE: 140 MMHG | BODY MASS INDEX: 28.67 KG/M2

## 2023-03-28 DIAGNOSIS — R73.9 HYPERGLYCEMIA: ICD-10-CM

## 2023-03-28 DIAGNOSIS — I10 PRIMARY HYPERTENSION: ICD-10-CM

## 2023-03-28 DIAGNOSIS — F41.9 ANXIETY: Primary | ICD-10-CM

## 2023-03-28 RX ORDER — LORAZEPAM 0.5 MG/1
0.5 TABLET ORAL EVERY 8 HOURS PRN
Qty: 30 TABLET | Refills: 2 | Status: SHIPPED | OUTPATIENT
Start: 2023-03-28

## 2023-03-28 NOTE — ASSESSMENT & PLAN NOTE
History of hypertension  It is noted patient's blood pressure is stable and patient states she is compliant with medication  She had no acute difficulties with her renal function with the labs that were performed in the emergency room  We will make adjustments with medication in the future if indicated

## 2023-03-28 NOTE — PROGRESS NOTES
Name: Joanne Esteban      : 1935      MRN: [id number]  Encounter Provider: Basia Escamilla DO  Encounter Date: 3/28/2023   Encounter department: [facility]    Assessment & Plan     1  Anxiety  Assessment & Plan:  Patient is here today for evaluation  Was recently seen in the emergency room outside of [place]  Apparently she went to go to bed at night and did not notice that the family dog was on the bed and she did disturb the dog and the dog did sleep at her  Patient did fall backwards with some head trauma external injury  Because of the dog attacked the patient became extremely anxious and was brought by her family because of concern to the emergency room for evaluation  We did look at the hospital records and complete work-up and evaluation which was extremely thorough with no acute findings  Patient has had no sustained injuries or discomfort but states her biggest problem at this point in time is anxiety  Patient has been on citalopram for a long period of time which had helped previously and we were able in the past to wean her off of Valium which has been stopped  She states she is having some acute anxiety still and after a long decision and discussion with the patient we have decided to give her a prescription for Ativan to take only as needed if she does have increased difficulties with anxiety  This was sent to the patient's mail order pharmacy and she may not have this for a few days  Has an appointment in approximately 3 weeks for reevaluation and we will discuss her emotional issues at that time  On maximal dose of citalopram    Orders:  -     LORazepam (ATIVAN) 0 5 mg tablet; Take 1 tablet (0 5 mg total) by mouth every 8 (eight) hours as needed for anxiety    2  Primary hypertension  Assessment & Plan:  History of hypertension  It is noted patient's blood pressure is stable and patient states she is compliant with medication    She had no acute difficulties with her renal function with the labs that were performed in the emergency room  We will make adjustments with medication in the future if indicated  3  Hyperglycemia  Assessment & Plan:  History of hyperglycemia but not overt diabetes  Patient has a slip for labs to be performed looking at a fasting blood sugar hemoglobin A1c with her next visit  As previously we discussed with the patient the importance of diet and watching her intake of not only concentrated sweets and simple carbohydrates but calories overall  Patient is commended on her weight loss since her last visit             Subjective     Patient is an 61-year-old female history of multiple medical problems outlined previously who is here today for acute evaluation  Patient relates approximately 1 week ago she was seen in the emergency room after an attack by her dog at home  Patient was able to prevent any major injury but did have a fall backwards and did have some muscle sprain and strain head contusion but not concussion  She states she is feeling back to normal physically but is having problems with anxiety because of this incident  Review of Systems   Constitutional: Negative  Patient states she is back to her normal activity level without sequela   HENT: Negative  Eyes: Negative  Respiratory: Negative  Cardiovascular: Negative  Gastrointestinal: Negative  Endocrine: Negative  Genitourinary: Negative  Musculoskeletal: Positive for arthralgias and back pain  Negative for gait problem, joint swelling, myalgias, neck pain and neck stiffness  Diffuse arthralgias with nothing acute  Skin: Negative  Allergic/Immunologic: Negative  Neurological: Negative  Hematological: Negative  Psychiatric/Behavioral: Negative for agitation, behavioral problems, confusion, decreased concentration, dysphoric mood, hallucinations, self-injury, sleep disturbance and suicidal ideas   The patient is nervous/anxious  The patient is not hyperactive  Past Medical History:   Diagnosis Date   • Anxiety    • GERD (gastroesophageal reflux disease)    • Hyperlipidemia    • Hypertension    • Osteoarthritis      Past Surgical History:   Procedure Laterality Date   • CATARACT EXTRACTION       Family History   Problem Relation Age of Onset   • Heart failure Mother    • Heart disease Mother    • Hypertension Father    • Hypertension Brother      Social History     Socioeconomic History   • Marital status:      Spouse name: Not on file   • Number of children: Not on file   • Years of education: Not on file   • Highest education level: Not on file   Occupational History   • Not on file   Tobacco Use   • Smoking status: Former   • Smokeless tobacco: Never   Substance and Sexual Activity   • Alcohol use: No   • Drug use: No   • Sexual activity: Not on file   Other Topics Concern   • Not on file   Social History Narrative    Daily Coffee Consumption (4 Cups/Day)     Social Determinants of Health     Financial Resource Strain: Low Risk    • Difficulty of Paying Living Expenses: Not very hard   Food Insecurity: Not on file   Transportation Needs: No Transportation Needs   • Lack of Transportation (Medical): No   • Lack of Transportation (Non-Medical):  No   Physical Activity: Not on file   Stress: Not on file   Social Connections: Not on file   Intimate Partner Violence: Not on file   Housing Stability: Not on file     Current Outpatient Medications on File Prior to Visit   Medication Sig   • aspirin 81 mg chewable tablet Chew Twice daily   • atorvastatin (LIPITOR) 10 mg tablet Take 1 tablet (10 mg total) by mouth daily   • CALCIUM CARBONATE-VITAMIN D PO Take 1 tablet by mouth daily   • citalopram (CeleXA) 40 mg tablet TAKE 1 TABLET DAILY   • losartan-hydrochlorothiazide (HYZAAR) 100-25 MG per tablet TAKE 1 TABLET DAILY   • potassium chloride (K-DUR,KLOR-CON) 10 mEq tablet TAKE 1 TABLET DAILY   • acyclovir (ZOVIRAX) "800 mg tablet Take 1 tablet (800 mg total) by mouth 4 (four) times a day for 7 days   • oxyCODONE-acetaminophen (Percocet) 5-325 mg per tablet Take 1 tablet by mouth every 6 (six) hours as needed for moderate pain or severe pain Max Daily Amount: 4 tablets (Patient not taking: Reported on 3/28/2023)     Allergies   Allergen Reactions   • Ampicillin      Other reaction(s): Unknown Allergic Reaction  Reaction Date: [id number];    • Sulfa Antibiotics      Other reaction(s): Unknown Allergic Reaction   • Sulfamethoxazole-Trimethoprim      Reaction Date: [id number];      Immunization History   Administered Date(s) Administered   • COVID-19 PFIZER VACCINE 0 3 ML IM 02/10/2021, 03/03/2021, 11/29/2021   • COVID-19 Pfizer vac (Kang-sucrose, gray cap) 12 yr+ IM 06/08/2022   • DTaP 5 05/14/2014   • INFLUENZA 11/08/2015   • Influenza Quadrivalent, 6-35 Months IM 09/28/2017   • Influenza Split High Dose Preservative Free IM 09/23/2013, 09/11/2014, 09/08/2016   • Influenza, high dose seasonal 0 7 mL 10/01/2018, 10/24/2019, 10/22/2020, 11/09/2021   • Influenza, seasonal, injectable 10/01/2010   • Pneumococcal Conjugate 13-Valent 12/03/2014   • Pneumococcal Polysaccharide PPV23 02/04/2019       Objective     /80 (BP Location: Left arm, Patient Position: Sitting)   Pulse 81   Temp 99 °F (37 2 °C) (Tympanic)   Ht 5' 6\" (1 676 m)   Wt 80 9 kg (178 lb 6 4 oz)   LMP  (LMP Unknown)   SpO2 98%   BMI 28 79 kg/m²     Physical Exam  Vitals and nursing note reviewed  Constitutional:       General: She is not in acute distress  Appearance: Normal appearance  She is not ill-appearing, toxic-appearing or diaphoretic  Comments: Patient is an anxious overweight 80-year-old female who is awake alert in no acute distress and oriented x3   HENT:      Head: Normocephalic and atraumatic  Right Ear: Tympanic membrane, ear canal and external ear normal  There is no impacted cerumen        Left Ear: Tympanic membrane, ear canal " and external ear normal  There is no impacted cerumen  Nose: Nose normal  No congestion or rhinorrhea  Mouth/Throat:      Mouth: Mucous membranes are moist       Pharynx: Oropharynx is clear  No oropharyngeal exudate or posterior oropharyngeal erythema  Eyes:      General: No scleral icterus  Right eye: No discharge  Left eye: No discharge  Extraocular Movements: Extraocular movements intact  Conjunctiva/sclera: Conjunctivae normal       Pupils: Pupils are equal, round, and reactive to light  Neck:      Vascular: No carotid bruit  Comments: Patient does have a slightly decreased range of motion both actively and passively to the cervical spine but today no pain with movement  Cardiovascular:      Rate and Rhythm: Normal rate and regular rhythm  Heart sounds: Normal heart sounds  No murmur heard  No friction rub  No gallop  Pulmonary:      Effort: Pulmonary effort is normal  No respiratory distress  Breath sounds: Normal breath sounds  No stridor  No wheezing, rhonchi or rales  Chest:      Chest wall: No tenderness  Abdominal:      General: Bowel sounds are normal  There is no distension  Palpations: Abdomen is soft  There is no mass  Tenderness: There is no abdominal tenderness  There is no right CVA tenderness, left CVA tenderness, guarding or rebound  Hernia: No hernia is present  Musculoskeletal:         General: Tenderness and deformity present  No swelling or signs of injury  Cervical back: Neck supple  No rigidity or tenderness  Right lower leg: Edema present  Left lower leg: Edema present  Comments: Diffuse tenderness lower cervical upper thoracic spine to palpation with slight paravertebral muscle spasm  Patient does have a flattening to her lumbar curve and slight tenderness to palpation SI joints bilaterally  Some mild degenerative changes in the hands and digits but nothing acute    Patient has a +1 edema bilateral lower extremities which is unchanged from previously   Lymphadenopathy:      Cervical: No cervical adenopathy  Skin:     General: Skin is warm and dry  Coloration: Skin is not jaundiced or pale  Findings: No bruising, erythema, lesion or rash  Neurological:      Mental Status: She is alert and oriented to person, place, and time  Mental status is at baseline  Cranial Nerves: No cranial nerve deficit  Sensory: No sensory deficit  Motor: No weakness  Coordination: Coordination normal       Gait: Gait normal       Deep Tendon Reflexes: Reflexes normal    Psychiatric:         Behavior: Behavior normal          Thought Content:  Thought content normal          Judgment: Judgment normal       Comments: Mildly anxious mood and affect       Claudine Mins, DO

## 2023-03-28 NOTE — ASSESSMENT & PLAN NOTE
Patient is here today for evaluation  Was recently seen in the emergency room outside of [place]  Apparently she went to go to bed at night and did not notice that the family dog was on the bed and she did disturb the dog and the dog did sleep at her  Patient did fall backwards with some head trauma external injury  Because of the dog attacked the patient became extremely anxious and was brought by her family because of concern to the emergency room for evaluation  We did look at the hospital records and complete work-up and evaluation which was extremely thorough with no acute findings  Patient has had no sustained injuries or discomfort but states her biggest problem at this point in time is anxiety  Patient has been on citalopram for a long period of time which had helped previously and we were able in the past to wean her off of Valium which has been stopped  She states she is having some acute anxiety still and after a long decision and discussion with the patient we have decided to give her a prescription for Ativan to take only as needed if she does have increased difficulties with anxiety  This was sent to the patient's mail order pharmacy and she may not have this for a few days  Has an appointment in approximately 3 weeks for reevaluation and we will discuss her emotional issues at that time    On maximal dose of citalopram

## 2023-03-28 NOTE — ASSESSMENT & PLAN NOTE
History of hyperglycemia but not overt diabetes  Patient has a slip for labs to be performed looking at a fasting blood sugar hemoglobin A1c with her next visit  As previously we discussed with the patient the importance of diet and watching her intake of not only concentrated sweets and simple carbohydrates but calories overall    Patient is commended on her weight loss since her last visit

## 2023-06-15 ENCOUNTER — RA CDI HCC (OUTPATIENT)
Dept: OTHER | Facility: HOSPITAL | Age: 88
End: 2023-06-15

## 2023-06-22 ENCOUNTER — OFFICE VISIT (OUTPATIENT)
Dept: INTERNAL MEDICINE CLINIC | Facility: CLINIC | Age: 88
End: 2023-06-22
Payer: MEDICARE

## 2023-06-22 VITALS
TEMPERATURE: 98.8 F | BODY MASS INDEX: 29.12 KG/M2 | OXYGEN SATURATION: 98 % | HEART RATE: 79 BPM | WEIGHT: 181.2 LBS | HEIGHT: 66 IN | DIASTOLIC BLOOD PRESSURE: 70 MMHG | SYSTOLIC BLOOD PRESSURE: 130 MMHG

## 2023-06-22 DIAGNOSIS — R73.9 HYPERGLYCEMIA: ICD-10-CM

## 2023-06-22 DIAGNOSIS — M81.0 AGE-RELATED OSTEOPOROSIS WITHOUT CURRENT PATHOLOGICAL FRACTURE: ICD-10-CM

## 2023-06-22 DIAGNOSIS — F41.9 ANXIETY: ICD-10-CM

## 2023-06-22 DIAGNOSIS — M54.2 CERVICAL MUSCLE PAIN: ICD-10-CM

## 2023-06-22 DIAGNOSIS — Z00.00 MEDICARE ANNUAL WELLNESS VISIT, SUBSEQUENT: Primary | ICD-10-CM

## 2023-06-22 DIAGNOSIS — I10 PRIMARY HYPERTENSION: ICD-10-CM

## 2023-06-22 PROCEDURE — 99214 OFFICE O/P EST MOD 30 MIN: CPT | Performed by: INTERNAL MEDICINE

## 2023-06-22 PROCEDURE — 96372 THER/PROPH/DIAG INJ SC/IM: CPT | Performed by: INTERNAL MEDICINE

## 2023-06-22 NOTE — PROGRESS NOTES
Name: Brendan Covington      : 1935      MRN: [id number]  Encounter Provider: Delores Hatch DO  Encounter Date: 2023   Encounter department: [facility]    Assessment & Plan     1  Medicare annual wellness visit, subsequent  -     Comprehensive metabolic panel; Future  -     CBC and differential; Future  -     Lipid panel; Future  -     UA (URINE) with reflex to Scope; Future; Expected date: 2023  -     Hemoglobin A1C; Future    2  Hyperglycemia  Assessment & Plan:  History of hyperglycemia but not overt diabetes  She admits that she is not always perfect with her diet but knows to watch her intake of untreated sweets and simple carbohydrates and calories overall  No substantial weight loss some weight since her last visit  We will continue to monitor her sugar levels but again overly did discuss the importance of diet to help important this is for her health and welfare  Orders:  -     Hemoglobin A1C; Future    3  Primary hypertension  Assessment & Plan:  History of hypertension  Patient's blood pressure was mildly elevated initially with presentation to the office  Once the patient was allowed to sit and relax it did come down to an acceptable range  Patient will continue present medication and renal function and make adjustment to medication if needed in the future  4  Age-related osteoporosis without current pathological fracture  Assessment & Plan:  Patient does have a history of osteoporosis  Will be receiving reinjection with Prolia today in the office  She is tolerating the medication well  We will consider when to recheck a another DEXA scan to make sure there is stability improvement with her bone function  Will return to the office in 6 months for repeat injection  Orders:  -     denosumab (PROLIA) subcutaneous injection 60 mg    5  Anxiety  Assessment & Plan:  Patient does have a longstanding history of anxiety disorder    Patient is now no longer taking the Ativan which she had been using  He is commended on her endeavors to get off of this medication  She continues to take escitalopram as previously prescribed  States her anxiety is under control  6  Cervical muscle pain  Assessment & Plan:  Still is having problems with neck pain and discomfort  Patient does have some tenderness to palpation  See for C5 on the right  Patient states it is better we did discuss returning to physical therapy for reevaluation but she states that she is satisfied at this point in time call if she needs further treatment  Subjective     80year-old female history of extensive medical problems outlined previously who is here today for a routine follow-up  She did have some labs performed prior to the visit today we did discuss the results  Patient states in general she is doing well doing well very proud of the fact she stopped using the Ativan and her anxiety is under control  She is also here today for repeat Prolia  injection    Review of Systems   Constitutional: Negative  HENT: Negative  Eyes: Negative  Respiratory: Negative  Cardiovascular: Positive for leg swelling  Negative for chest pain and palpitations  Chronic edema bilateral lower extremities but no increase  Venous stasis   Gastrointestinal: Negative  Endocrine: Negative  Genitourinary: Negative  Musculoskeletal: Positive for neck pain  Negative for arthralgias, back pain, gait problem, joint swelling, myalgias and neck stiffness  Skin: Negative  Allergic/Immunologic: Negative  Neurological: Negative  Hematological: Negative  Psychiatric/Behavioral: Negative          Past Medical History:   Diagnosis Date   • Anxiety    • GERD (gastroesophageal reflux disease)    • Hyperlipidemia    • Hypertension    • Osteoarthritis      Past Surgical History:   Procedure Laterality Date   • CATARACT EXTRACTION       Family History   Problem Relation Age of Onset   • Heart failure Mother    • Heart disease Mother    • Hypertension Father    • Hypertension Brother      Social History     Socioeconomic History   • Marital status:      Spouse name: Not on file   • Number of children: Not on file   • Years of education: Not on file   • Highest education level: Not on file   Occupational History   • Not on file   Tobacco Use   • Smoking status: Former   • Smokeless tobacco: Never   Substance and Sexual Activity   • Alcohol use: No   • Drug use: No   • Sexual activity: Not on file   Other Topics Concern   • Not on file   Social History Narrative    Daily Coffee Consumption (4 Cups/Day)     Social Determinants of Health     Financial Resource Strain: Low Risk  (9/9/2022)    Overall Financial Resource Strain (CARDIA)    • Difficulty of Paying Living Expenses: Not very hard   Food Insecurity: Not on file   Transportation Needs: No Transportation Needs (9/9/2022)    PRAPARE - Transportation    • Lack of Transportation (Medical): No    • Lack of Transportation (Non-Medical):  No   Physical Activity: Not on file   Stress: Not on file   Social Connections: Not on file   Intimate Partner Violence: Not on file   Housing Stability: Not on file     Current Outpatient Medications on File Prior to Visit   Medication Sig   • atorvastatin (LIPITOR) 10 mg tablet Take 1 tablet (10 mg total) by mouth daily   • CALCIUM CARBONATE-VITAMIN D PO Take 1 tablet by mouth daily   • citalopram (CeleXA) 40 mg tablet TAKE 1 TABLET DAILY   • losartan-hydrochlorothiazide (HYZAAR) 100-25 MG per tablet TAKE 1 TABLET DAILY   • potassium chloride (K-DUR,KLOR-CON) 10 mEq tablet TAKE 1 TABLET DAILY   • acyclovir (ZOVIRAX) 800 mg tablet Take 1 tablet (800 mg total) by mouth 4 (four) times a day for 7 days   • aspirin 81 mg chewable tablet Chew Twice daily (Patient not taking: Reported on 6/22/2023)   • LORazepam (ATIVAN) 0 5 mg tablet Take 1 tablet (0 5 mg total) by mouth every 8 (eight) hours as needed for "anxiety (Patient not taking: Reported on 6/22/2023)   • oxyCODONE-acetaminophen (Percocet) 5-325 mg per tablet Take 1 tablet by mouth every 6 (six) hours as needed for moderate pain or severe pain Max Daily Amount: 4 tablets (Patient not taking: Reported on 4/20/2023)     Allergies   Allergen Reactions   • Ampicillin      Other reaction(s): Unknown Allergic Reaction  Reaction Date: [id number];    • Sulfa Antibiotics      Other reaction(s): Unknown Allergic Reaction   • Sulfamethoxazole-Trimethoprim      Reaction Date: [id number];      Immunization History   Administered Date(s) Administered   • COVID-19 PFIZER VACCINE 0 3 ML IM 02/10/2021, 03/03/2021, 11/29/2021   • COVID-19 Pfizer vac (Kang-sucrose, gray cap) 12 yr+ IM 06/08/2022   • DTaP 5 05/14/2014   • INFLUENZA 11/08/2015   • Influenza Quadrivalent, 6-35 Months IM 09/28/2017   • Influenza Split High Dose Preservative Free IM 09/23/2013, 09/11/2014, 09/08/2016   • Influenza, high dose seasonal 0 7 mL 10/01/2018, 10/24/2019, 10/22/2020, 11/09/2021   • Influenza, seasonal, injectable 10/01/2010   • Pneumococcal Conjugate 13-Valent 12/03/2014   • Pneumococcal Polysaccharide PPV23 02/04/2019       Objective     /70   Pulse 79   Temp 98 8 °F (37 1 °C) (Tympanic)   Ht 5' 6\" (1 676 m)   Wt 82 2 kg (181 lb 3 2 oz)   LMP  (LMP Unknown)   SpO2 98%   BMI 29 25 kg/m²     Physical Exam  Vitals and nursing note reviewed  Constitutional:       General: She is not in acute distress  Appearance: Normal appearance  She is not ill-appearing, toxic-appearing or diaphoretic  Comments: Pleasant, overweight 58-year-old female who is awake alert no acute distress oriented x3   HENT:      Head: Normocephalic and atraumatic  Right Ear: Tympanic membrane, ear canal and external ear normal  There is no impacted cerumen  Left Ear: Tympanic membrane, ear canal and external ear normal  There is no impacted cerumen        Nose: Nose normal  No congestion or " rhinorrhea  Mouth/Throat:      Mouth: Mucous membranes are moist       Pharynx: Oropharynx is clear  No oropharyngeal exudate or posterior oropharyngeal erythema  Eyes:      General: No scleral icterus  Right eye: No discharge  Left eye: No discharge  Extraocular Movements: Extraocular movements intact  Conjunctiva/sclera: Conjunctivae normal       Pupils: Pupils are equal, round, and reactive to light  Neck:      Vascular: No carotid bruit  Comments: Minor tenderness to palpation posteriorly to the cervical spine area of approximately C3-C4 on the right  Slight paravertebral muscle spasm  Full range of motion without pain  Cardiovascular:      Rate and Rhythm: Normal rate and regular rhythm  Heart sounds: Normal heart sounds  No murmur heard  No friction rub  No gallop  Pulmonary:      Effort: Pulmonary effort is normal  No respiratory distress  Breath sounds: Normal breath sounds  No stridor  No wheezing, rhonchi or rales  Chest:      Chest wall: No tenderness  Abdominal:      General: Bowel sounds are normal  There is no distension  Palpations: Abdomen is soft  There is no mass  Tenderness: There is no abdominal tenderness  There is no right CVA tenderness, left CVA tenderness, guarding or rebound  Hernia: No hernia is present  Musculoskeletal:         General: No swelling, deformity or signs of injury  Cervical back: Normal range of motion and neck supple  Tenderness present  No rigidity  Right lower leg: Edema present  Left lower leg: Edema present  Comments: Chronic venous stasis +1 edema bilateral lower extremities  Does have diffuse arthritic pain acute   Lymphadenopathy:      Cervical: No cervical adenopathy  Skin:     General: Skin is warm and dry  Coloration: Skin is not jaundiced or pale  Findings: Lesion present  No bruising, erythema or rash        Comments: Patient does have a crusted lesion midline to her nares  Will be seeing her dermatologist for this  Does not have increasing vascularity  Seems like a verrucous   Neurological:      Mental Status: She is alert and oriented to person, place, and time  Mental status is at baseline  Cranial Nerves: No cranial nerve deficit  Sensory: No sensory deficit  Motor: No weakness  Coordination: Coordination normal       Gait: Gait normal       Deep Tendon Reflexes: Reflexes normal    Psychiatric:         Mood and Affect: Mood normal          Behavior: Behavior normal          Thought Content:  Thought content normal          Judgment: Judgment normal        Dante Olszewski, DO

## 2023-06-22 NOTE — ASSESSMENT & PLAN NOTE
History of hypertension  Patient's blood pressure was mildly elevated initially with presentation to the office  Once the patient was allowed to sit and relax it did come down to an acceptable range  Patient will continue present medication and renal function and make adjustment to medication if needed in the future

## 2023-06-22 NOTE — ASSESSMENT & PLAN NOTE
Patient does have a longstanding history of anxiety disorder  Patient is now no longer taking the Ativan which she had been using  He is commended on her endeavors to get off of this medication  She continues to take escitalopram as previously prescribed  States her anxiety is under control

## 2023-06-22 NOTE — ASSESSMENT & PLAN NOTE
Patient does have a history of osteoporosis  Will be receiving reinjection with Prolia today in the office  She is tolerating the medication well  We will consider when to recheck a another DEXA scan to make sure there is stability improvement with her bone function  Will return to the office in 6 months for repeat injection

## 2023-06-22 NOTE — ASSESSMENT & PLAN NOTE
History of hyperglycemia but not overt diabetes  She admits that she is not always perfect with her diet but knows to watch her intake of untreated sweets and simple carbohydrates and calories overall  No substantial weight loss some weight since her last visit  We will continue to monitor her sugar levels but again overly did discuss the importance of diet to help important this is for her health and welfare

## 2023-06-22 NOTE — ASSESSMENT & PLAN NOTE
Still is having problems with neck pain and discomfort  Patient does have some tenderness to palpation  See for C5 on the right  Patient states it is better we did discuss returning to physical therapy for reevaluation but she states that she is satisfied at this point in time call if she needs further treatment

## 2023-10-05 ENCOUNTER — RA CDI HCC (OUTPATIENT)
Dept: OTHER | Facility: HOSPITAL | Age: 88
End: 2023-10-05

## 2023-10-05 NOTE — PROGRESS NOTES
[address] [facility] coding opportunities       Chart reviewed, no opportunity found: CHART REVIEWED, NO OPPORTUNITY FOUND        Patients Insurance     Medicare Insurance: Medicare

## 2023-10-10 ENCOUNTER — TRANSITIONAL CARE MANAGEMENT (OUTPATIENT)
Dept: INTERNAL MEDICINE CLINIC | Facility: CLINIC | Age: 88
End: 2023-10-10

## 2023-10-11 ENCOUNTER — OFFICE VISIT (OUTPATIENT)
Dept: INTERNAL MEDICINE CLINIC | Facility: CLINIC | Age: 88
End: 2023-10-11
Payer: MEDICARE

## 2023-10-11 VITALS
SYSTOLIC BLOOD PRESSURE: 144 MMHG | TEMPERATURE: 98.1 F | RESPIRATION RATE: 16 BRPM | WEIGHT: 176 LBS | DIASTOLIC BLOOD PRESSURE: 88 MMHG | OXYGEN SATURATION: 95 % | HEART RATE: 92 BPM | HEIGHT: 66 IN | BODY MASS INDEX: 28.28 KG/M2

## 2023-10-11 DIAGNOSIS — M81.0 AGE-RELATED OSTEOPOROSIS WITHOUT CURRENT PATHOLOGICAL FRACTURE: Primary | ICD-10-CM

## 2023-10-11 DIAGNOSIS — R73.9 HYPERGLYCEMIA: ICD-10-CM

## 2023-10-11 DIAGNOSIS — F41.9 ANXIETY: ICD-10-CM

## 2023-10-11 PROBLEM — M79.604 ANTERIOR LEG PAIN, RIGHT: Status: ACTIVE | Noted: 2023-10-11

## 2023-10-11 PROCEDURE — 99495 TRANSJ CARE MGMT MOD F2F 14D: CPT | Performed by: INTERNAL MEDICINE

## 2023-10-11 RX ORDER — PREDNISONE 20 MG/1
40 TABLET ORAL
COMMUNITY
Start: 2023-10-10

## 2023-10-11 RX ORDER — GABAPENTIN 100 MG/1
100 CAPSULE ORAL 3 TIMES DAILY
COMMUNITY
Start: 2023-10-09 | End: 2023-11-08

## 2023-10-11 RX ORDER — ACETAMINOPHEN 500 MG
1000 TABLET ORAL 3 TIMES DAILY
COMMUNITY
Start: 2023-10-09 | End: 2023-10-19

## 2023-10-11 RX ORDER — LOSARTAN POTASSIUM 100 MG/1
100 TABLET ORAL DAILY
COMMUNITY
Start: 2023-10-10 | End: 2023-11-09

## 2023-10-11 RX ORDER — NIFEDIPINE 30 MG/1
30 TABLET, EXTENDED RELEASE ORAL DAILY
COMMUNITY
Start: 2023-10-10 | End: 2023-11-09

## 2023-10-11 RX ORDER — LORAZEPAM 0.5 MG/1
0.5 TABLET ORAL EVERY 8 HOURS PRN
Qty: 90 TABLET | Refills: 2 | Status: SHIPPED | OUTPATIENT
Start: 2023-10-11

## 2023-10-11 NOTE — ASSESSMENT & PLAN NOTE
No evidence of diabetes during her hospitalization. Apparently patient has cut down on her calorie intake has lost some weight. As previously we will continue to monitor her sugar and hemoglobin A1c.

## 2023-10-11 NOTE — ASSESSMENT & PLAN NOTE
Patient is here today for transition of care visit after hospitalization. Patient was complaining of some discomfort to her anterior thigh on the right-hand side. Did have acute ER visit and evaluation and discharged to home. With recurrence of pain again return to the emergency room and did end up being admitted because of difficulties with ambulation and increasing pain and discomfort. She did have full work-up and evaluation during her hospitalization including x-rays, vascular studies, checking metabolic profile finding no acute lesions that needed to be addressed. After weeks period of time she was discharged to home and they did modify some of her medication including changing her blood pressure to pill to Procardia, giving the patient gabapentin 100 mg 3 times a day to take to help with her discomfort, to have arrangements made to be seen by physical therapy, Occupational Therapy at home to help with ambulation and return to independence as far as activities of daily living. She is accompanied today to the office with her daughter. Patient is complaining of some chronic pain into the anterior thigh on the right which she states comes and goes and apparently she has occasionally some shooting pain and discomfort and on a scale of 1-10 approximately 5-6 at the worst.  She further relates the Tylenol and gabapentin seem to be helping. With maneuvers patient does have some tenderness to palpation to anterior thigh. With gentle maneuvers internal/external rotation no pain referable to the groin on the right. Patient has no significant weakness to lower extremities. As to the etiology of her pain and discomfort this seems to be mostly muscular. She will continue present medication and again orthopedics will probably arrange for the patient to have physical therapy, occupational therapy at home.   Continue with change of medication and the only request the patient is that she have new prescription for Ativan to take as needed she has any increasing with anxiety and prescription was sent. She again will be seen by orthopedics early next week and begin physical therapy occupational therapy at home. Return to our office in approximately 4 to 6 weeks.

## 2023-10-11 NOTE — PROGRESS NOTES
Name: Ayaka Osman      : 1935      MRN: [id number]  Encounter Provider: Krupa Mireles DO  Encounter Date: 10/11/2023   Encounter department: [address] [place] [address]  TCM Call       Date and time call was made  10/10/2023  4:23 PM    Hospital care reviewed  Records reviewed    Patient was hospitialized at  Other (comment)    Date of Admission  10/04/23    Date of discharge  10/06/23    Disposition  Home    Were the patients medications reviewed and updated  No          TCM Call       Scheduled for follow up? Yes    Did you obtain your prescribed medications  Yes    Do you need help managing your prescriptions or medications  No    Is transportation to your appointment needed  No    I have advised the patient to call PCP with any new or worsening symptoms  Rosa Hoffmann MA            Assessment & Plan     1. Age-related osteoporosis without current pathological fracture  Assessment & Plan: We will continue with Prolia injections through our office      2. Anxiety  -     LORazepam (ATIVAN) 0.5 mg tablet; Take 1 tablet (0.5 mg total) by mouth every 8 (eight) hours as needed for anxiety    3. Hyperglycemia  Assessment & Plan:  No evidence of diabetes during her hospitalization. Apparently patient has cut down on her calorie intake has lost some weight. As previously we will continue to monitor her sugar and hemoglobin A1c. Depression Screening and Follow-up Plan: Patient was screened for depression during today's encounter. They screened negative with a PHQ-2 score of 2. Subjective     Patient is a 79-year-old female history of multiple medical problems outlined previously who is here today for transition of care visit. Patient had recent hospitalization because of intractable right leg discomfort anteriorly to the thigh. .  We did review hospital records work-up and evaluation which was extensive finding no acute etiology for her pain and discomfort which apparently is improving mildly but still having some difficulty. To be seen by orthopedics next week to start physical therapy, Occupational Therapy at home. For pain they gave the patient gabapentin 100 mg 3 times daily which she states has been helpful. Review of Systems   Constitutional:  Positive for activity change. Negative for appetite change, chills, diaphoresis, fatigue, fever and unexpected weight change. Some restriction with activity level secondary to her leg pain. Now walking with a walker   HENT: Negative. Eyes: Negative. Respiratory: Negative. Cardiovascular: Negative. Chronic edema bilateral lower extremity which is unchanged   Gastrointestinal: Negative. Endocrine: Negative. Genitourinary: Negative. Musculoskeletal:  Positive for arthralgias and gait problem. Negative for back pain, joint swelling, myalgias, neck pain and neck stiffness. Skin: Negative. Allergic/Immunologic: Negative. Hematological: Negative. Psychiatric/Behavioral: Negative. Past Medical History:   Diagnosis Date    Anxiety     GERD (gastroesophageal reflux disease)     Hyperlipidemia     Hypertension     Osteoarthritis      Past Surgical History:   Procedure Laterality Date    CATARACT EXTRACTION       Family History   Problem Relation Age of Onset    Heart failure Mother     Heart disease Mother     Hypertension Father     Hypertension Brother      Social History     Socioeconomic History    Marital status:       Spouse name: None    Number of children: None    Years of education: None    Highest education level: None   Occupational History    None   Tobacco Use    Smoking status: Former    Smokeless tobacco: Never   Substance and Sexual Activity    Alcohol use: No    Drug use: No    Sexual activity: None   Other Topics Concern    None   Social History Narrative    Daily Coffee Consumption (4 Cups/Day)     Social Determinants of Health     Financial Resource Strain: Low Risk (9/9/2022)    Overall Financial Resource Strain (CARDIA)     Difficulty of Paying Living Expenses: Not very hard   Food Insecurity: Not on file   Transportation Needs: No Transportation Needs (9/9/2022)    PRAPARE - Transportation     Lack of Transportation (Medical): No     Lack of Transportation (Non-Medical):  No   Physical Activity: Not on file   Stress: Not on file   Social Connections: Not on file   Intimate Partner Violence: Not on file   Housing Stability: Not on file     Current Outpatient Medications on File Prior to Visit   Medication Sig    acetaminophen (TYLENOL) 500 mg tablet Take 1,000 mg by mouth Three times a day    aspirin 81 mg chewable tablet Chew Twice daily    atorvastatin (LIPITOR) 10 mg tablet Take 1 tablet (10 mg total) by mouth daily    CALCIUM CARBONATE-VITAMIN D PO Take 1 tablet by mouth daily    citalopram (CeleXA) 40 mg tablet TAKE 1 TABLET DAILY    gabapentin (NEURONTIN) 100 mg capsule Take 100 mg by mouth Three times a day    losartan (COZAAR) 100 MG tablet Take 100 mg by mouth daily    NIFEdipine (PROCARDIA XL) 30 mg 24 hr tablet Take 30 mg by mouth daily    potassium chloride (K-DUR,KLOR-CON) 10 mEq tablet TAKE 1 TABLET DAILY    predniSONE 20 mg tablet Take 40 mg by mouth    [DISCONTINUED] LORazepam (ATIVAN) 0.5 mg tablet Take 1 tablet (0.5 mg total) by mouth every 8 (eight) hours as needed for anxiety    [DISCONTINUED] acyclovir (ZOVIRAX) 800 mg tablet Take 1 tablet (800 mg total) by mouth 4 (four) times a day for 7 days    [DISCONTINUED] losartan-hydrochlorothiazide (HYZAAR) 100-25 MG per tablet TAKE 1 TABLET DAILY (Patient not taking: Reported on 10/11/2023)    [DISCONTINUED] oxyCODONE-acetaminophen (Percocet) 5-325 mg per tablet Take 1 tablet by mouth every 6 (six) hours as needed for moderate pain or severe pain Max Daily Amount: 4 tablets (Patient not taking: Reported on 4/20/2023)     Allergies   Allergen Reactions    Ampicillin      Other reaction(s): Unknown Allergic Reaction  Reaction Date: [id number];     Sulfa Antibiotics      Other reaction(s): Unknown Allergic Reaction    Sulfamethoxazole-Trimethoprim      Reaction Date: [id number];      Immunization History   Administered Date(s) Administered    COVID-19 PFIZER VACCINE 0.3 ML IM 02/10/2021, 03/03/2021, 11/29/2021    COVID-19 Pfizer vac (Kang-sucrose, gray cap) 12 yr+ IM 06/08/2022    DTaP 5 05/14/2014    INFLUENZA 11/08/2015    Influenza Quadrivalent, 6-35 Months IM 09/28/2017    Influenza Split High Dose Preservative Free IM 09/23/2013, 09/11/2014, 09/08/2016    Influenza, high dose seasonal 0.7 mL 10/01/2018, 10/24/2019, 10/22/2020, 11/09/2021, 10/09/2023    Influenza, seasonal, injectable 10/01/2010    Pneumococcal Conjugate 13-Valent 12/03/2014    Pneumococcal Polysaccharide PPV23 02/04/2019       Objective     /88   Pulse 92   Temp 98.1 °F (36.7 °C)   Resp 16   Ht 5' 6" (1.676 m)   Wt 79.8 kg (176 lb)   LMP  (LMP Unknown)   SpO2 95%   BMI 28.41 kg/m²     Physical Exam  Vitals and nursing note reviewed. Constitutional:       General: She is not in acute distress. Appearance: Normal appearance. She is not ill-appearing, toxic-appearing or diaphoretic. Comments: Patient is an 26-year-old female who is awake alert walking with a walker accompanied by her daughter. Patient is constantly rubbing her right anterior thigh. Otherwise in no acute distress and oriented x3 slightly anxious   HENT:      Head: Normocephalic and atraumatic. Right Ear: Tympanic membrane, ear canal and external ear normal. There is no impacted cerumen. Left Ear: Tympanic membrane, ear canal and external ear normal. There is no impacted cerumen. Nose: Nose normal. No congestion or rhinorrhea. Mouth/Throat:      Mouth: Mucous membranes are moist.      Pharynx: Oropharynx is clear. No oropharyngeal exudate or posterior oropharyngeal erythema. Eyes:      General: No scleral icterus.         Right eye: No discharge. Left eye: No discharge. Extraocular Movements: Extraocular movements intact. Conjunctiva/sclera: Conjunctivae normal.      Pupils: Pupils are equal, round, and reactive to light. Neck:      Vascular: No carotid bruit. Comments: Minor tenderness to palpation posteriorly to the cervical spine area of approximately C3-C4 on the right. Slight paravertebral muscle spasm. Full range of motion without pain. Cardiovascular:      Rate and Rhythm: Normal rate and regular rhythm. Heart sounds: Normal heart sounds. No murmur heard. No friction rub. No gallop. Pulmonary:      Effort: Pulmonary effort is normal. No respiratory distress. Breath sounds: Normal breath sounds. No stridor. No wheezing, rhonchi or rales. Chest:      Chest wall: No tenderness. Abdominal:      General: Bowel sounds are normal. There is no distension. Palpations: Abdomen is soft. There is no mass. Tenderness: There is no abdominal tenderness. There is no right CVA tenderness, left CVA tenderness, guarding or rebound. Hernia: No hernia is present. Musculoskeletal:         General: Tenderness present. No swelling, deformity or signs of injury. Cervical back: Normal range of motion and neck supple. Tenderness present. No rigidity. Right lower leg: Edema present. Left lower leg: Edema present. Comments: Chronic venous stasis +1 edema bilateral lower extremities. Does have diffuse arthritic pain acute to anterior thigh on the right mostly in the middle of her thigh   Lymphadenopathy:      Cervical: No cervical adenopathy. Skin:     General: Skin is warm and dry. Coloration: Skin is not jaundiced or pale. Findings: No bruising, erythema, lesion or rash. Comments: Patient does have a crusted lesion midline to her nares. Will be seeing her dermatologist for this. Does not have increasing vascularity.   Seems like a verrucous   Neurological: Mental Status: She is alert and oriented to person, place, and time. Mental status is at baseline. Cranial Nerves: No cranial nerve deficit. Sensory: No sensory deficit. Motor: No weakness. Coordination: Coordination normal.      Gait: Gait normal.      Deep Tendon Reflexes: Reflexes normal.   Psychiatric:         Mood and Affect: Mood normal.         Behavior: Behavior normal.         Thought Content:  Thought content normal.         Judgment: Judgment normal.       Jaleesa Fluke, DO

## 2023-11-06 DIAGNOSIS — G89.29 CHRONIC BILATERAL LOW BACK PAIN WITHOUT SCIATICA: Primary | ICD-10-CM

## 2023-11-06 DIAGNOSIS — M54.50 CHRONIC BILATERAL LOW BACK PAIN WITHOUT SCIATICA: Primary | ICD-10-CM

## 2023-11-06 DIAGNOSIS — I10 ESSENTIAL HYPERTENSION: Primary | ICD-10-CM

## 2023-11-06 RX ORDER — NIFEDIPINE 30 MG/1
30 TABLET, EXTENDED RELEASE ORAL DAILY
Qty: 30 TABLET | Refills: 0 | Status: SHIPPED | OUTPATIENT
Start: 2023-11-06 | End: 2023-12-06

## 2023-11-06 RX ORDER — GABAPENTIN 100 MG/1
100 CAPSULE ORAL 3 TIMES DAILY
Qty: 90 CAPSULE | Refills: 3 | Status: SHIPPED | OUTPATIENT
Start: 2023-11-06 | End: 2023-11-15

## 2023-11-06 RX ORDER — LOSARTAN POTASSIUM 100 MG/1
100 TABLET ORAL DAILY
Qty: 30 TABLET | Refills: 0 | Status: SHIPPED | OUTPATIENT
Start: 2023-11-06 | End: 2023-11-13

## 2023-11-13 ENCOUNTER — TELEPHONE (OUTPATIENT)
Dept: INTERNAL MEDICINE CLINIC | Facility: CLINIC | Age: 88
End: 2023-11-13

## 2023-11-13 ENCOUNTER — OFFICE VISIT (OUTPATIENT)
Dept: INTERNAL MEDICINE CLINIC | Facility: CLINIC | Age: 88
End: 2023-11-13
Payer: MEDICARE

## 2023-11-13 VITALS
TEMPERATURE: 98.9 F | SYSTOLIC BLOOD PRESSURE: 134 MMHG | HEART RATE: 96 BPM | OXYGEN SATURATION: 95 % | HEIGHT: 66 IN | DIASTOLIC BLOOD PRESSURE: 74 MMHG | BODY MASS INDEX: 27.8 KG/M2 | WEIGHT: 173 LBS

## 2023-11-13 DIAGNOSIS — M54.2 CERVICAL MUSCLE PAIN: ICD-10-CM

## 2023-11-13 DIAGNOSIS — E78.01 FAMILIAL HYPERCHOLESTEROLEMIA: ICD-10-CM

## 2023-11-13 DIAGNOSIS — I10 PRIMARY HYPERTENSION: Primary | ICD-10-CM

## 2023-11-13 DIAGNOSIS — F41.9 ANXIETY: ICD-10-CM

## 2023-11-13 DIAGNOSIS — M79.604 ANTERIOR LEG PAIN, RIGHT: ICD-10-CM

## 2023-11-13 PROCEDURE — 99214 OFFICE O/P EST MOD 30 MIN: CPT | Performed by: INTERNAL MEDICINE

## 2023-11-13 RX ORDER — LOSARTAN POTASSIUM 100 MG/1
100 TABLET ORAL DAILY
Qty: 90 TABLET | Refills: 3 | Status: SHIPPED | OUTPATIENT
Start: 2023-11-13

## 2023-11-13 RX ORDER — NIFEDIPINE 30 MG/1
30 TABLET, FILM COATED, EXTENDED RELEASE ORAL DAILY
Qty: 90 TABLET | Refills: 3 | Status: SHIPPED | OUTPATIENT
Start: 2023-11-13 | End: 2024-11-07

## 2023-11-13 NOTE — ASSESSMENT & PLAN NOTE
History of hyperlipidemia. Remains on medication as previously. Patient states that she has made some changes with her diet trying to reduce her intake of fats and cholesterol. As noted patient has been losing some weight and patient is commended for this.

## 2023-11-13 NOTE — ASSESSMENT & PLAN NOTE
Longstanding history of anxiety disorder. Patient continues to take lorazepam as needed which she stated has been helpful. Patient relates that she is feeling better physically she needs less medication in order to control her anxiety.   She also continues to take citalopram as previously prescribed

## 2023-11-13 NOTE — ASSESSMENT & PLAN NOTE
Patient is still having some spasm to the cervical muscular region on the right. Patient has a known masses or abnormalities slight tenderness to the scalene muscle on the right-hand side. No pain with movement to her neck.

## 2023-11-13 NOTE — TELEPHONE ENCOUNTER
Was a prior auth done for gabapentin? 73y Male complaining of shortness of breath.  Patient complaining of shortness of breath    copd  copd ex  smoker - nicotine dep  anxious    ct chest reviewed - pulm nodules - will need to follow up with Dr Nieves for F/U and surveillance -     outpatient records reviewed - follows with Dr Nieves - Pulm - outpatient - in [place]  smoking cess ed - counseling  COPD ex - Prednisone - Spiriva - Symbicort - Albuterol PRN nebs for sob and or wheezing  robitussin prn  tylenol prn  VBG - pending - TSH - CRP - noted  Xanax prn for anxiety  monitor VS and Sat  keep sat > 88 pct  Lung Ca screen as outpatient with Low dose CT chest  spoke with pt - wife -   RVP - noted

## 2023-11-13 NOTE — ASSESSMENT & PLAN NOTE
Patient is here today for reevaluation. As with her previous visit was seen and admitted to the hospital because of right leg pain quadriceps. Patient did have full work-up and evaluation and was found that this was secondary to muscle stress and injury. Patient is now going to physical therapy states that she is now ambulating with a cane alone and is having less pain and disability feeling overall improved. Had no evidence of any abnormalities on examination today and she will continue with her physical therapy program for strengthening ambulation and stability.

## 2023-11-13 NOTE — PROGRESS NOTES
Name: Geena Ritter      : 1935      MRN: [id number]  Encounter Provider: Arthur De Guzman DO  Encounter Date: 2023   Encounter department: [facility] INTERNAL MEDICINE    Assessment & Plan     1. Primary hypertension  Assessment & Plan:  History of hypertension. As noted blood pressure is showing adequate control with present treatment. Patient will continue present medication and surveillance and we will continue to monitor her blood pressure and her kidney function and make changes to medication in the future if necessary. With her hospitalization there were some changes of medication now on nifedipine and losartan. Orders:  -     losartan (Cozaar) 100 MG tablet; Take 1 tablet (100 mg total) by mouth daily  -     NIFEdipine ER (ADALAT CC) 30 MG 24 hr tablet; Take 1 tablet (30 mg total) by mouth daily    2. Anterior leg pain, right  Assessment & Plan:  Patient is here today for reevaluation. As with her previous visit was seen and admitted to the hospital because of right leg pain quadriceps. Patient did have full work-up and evaluation and was found that this was secondary to muscle stress and injury. Patient is now going to physical therapy states that she is now ambulating with a cane alone and is having less pain and disability feeling overall improved. Had no evidence of any abnormalities on examination today and she will continue with her physical therapy program for strengthening ambulation and stability. 3. Familial hypercholesterolemia  Assessment & Plan:  History of hyperlipidemia. Remains on medication as previously. Patient states that she has made some changes with her diet trying to reduce her intake of fats and cholesterol. As noted patient has been losing some weight and patient is commended for this. 4. Anxiety  Assessment & Plan:  Longstanding history of anxiety disorder.   Patient continues to take lorazepam as needed which she stated has been helpful. Patient relates that she is feeling better physically she needs less medication in order to control her anxiety. She also continues to take citalopram as previously prescribed      5. Cervical muscle pain  Assessment & Plan:  Patient is still having some spasm to the cervical muscular region on the right. Patient has a known masses or abnormalities slight tenderness to the scalene muscle on the right-hand side. No pain with movement to her neck. Subjective     Patient is here for reevaluation. Admitted to the hospital recently with pain in her right anterior thigh. Patient did undergo a very thorough work-up and evaluation for her pain and discomfort and now is continuing her physical therapy program stating that she has less pain and discomfort and overall improved. Patient states further she has had more stability with ambulation and she is feeling stronger  Review of Systems   Constitutional:  Positive for activity change. Negative for appetite change, chills, diaphoresis, fatigue, fever and unexpected weight change. Has some improvements as far as activity level is concerned with better stability. HENT: Negative. Eyes: Negative. Respiratory: Negative. Cardiovascular: Negative. Gastrointestinal: Negative. Endocrine: Negative. Genitourinary: Negative. Musculoskeletal: Negative. Skin: Negative. Allergic/Immunologic: Negative. Neurological: Negative. Hematological: Negative. Psychiatric/Behavioral: Negative.        Past Medical History:   Diagnosis Date   • Anxiety    • GERD (gastroesophageal reflux disease)    • Hyperlipidemia    • Hypertension    • Osteoarthritis      Past Surgical History:   Procedure Laterality Date   • CATARACT EXTRACTION       Family History   Problem Relation Age of Onset   • Heart failure Mother    • Heart disease Mother    • Hypertension Father    • Hypertension Brother      Social History     Socioeconomic History • Marital status:      Spouse name: None   • Number of children: None   • Years of education: None   • Highest education level: None   Occupational History   • None   Tobacco Use   • Smoking status: Former   • Smokeless tobacco: Never   Vaping Use   • Vaping Use: Never used   Substance and Sexual Activity   • Alcohol use: No   • Drug use: No   • Sexual activity: None   Other Topics Concern   • None   Social History Narrative    Daily Coffee Consumption (4 Cups/Day)     Social Determinants of Health     Financial Resource Strain: Low Risk  (9/9/2022)    Overall Financial Resource Strain (CARDIA)    • Difficulty of Paying Living Expenses: Not very hard   Food Insecurity: Not on file   Transportation Needs: No Transportation Needs (9/9/2022)    PRAPARE - Transportation    • Lack of Transportation (Medical): No    • Lack of Transportation (Non-Medical):  No   Physical Activity: Not on file   Stress: Not on file   Social Connections: Not on file   Intimate Partner Violence: Not on file   Housing Stability: Not on file     Current Outpatient Medications on File Prior to Visit   Medication Sig   • aspirin 81 mg chewable tablet Chew Twice daily   • atorvastatin (LIPITOR) 10 mg tablet Take 1 tablet (10 mg total) by mouth daily   • CALCIUM CARBONATE-VITAMIN D PO Take 1 tablet by mouth daily   • citalopram (CeleXA) 40 mg tablet TAKE 1 TABLET DAILY   • gabapentin (NEURONTIN) 100 mg capsule Take 1 capsule (100 mg total) by mouth 3 (three) times a day   • LORazepam (ATIVAN) 0.5 mg tablet Take 1 tablet (0.5 mg total) by mouth every 8 (eight) hours as needed for anxiety   • NIFEdipine (PROCARDIA XL) 30 mg 24 hr tablet Take 1 tablet (30 mg total) by mouth daily   • potassium chloride (K-DUR,KLOR-CON) 10 mEq tablet TAKE 1 TABLET DAILY   • predniSONE 20 mg tablet Take 40 mg by mouth   • [DISCONTINUED] losartan (COZAAR) 100 MG tablet Take 1 tablet (100 mg total) by mouth daily     Allergies   Allergen Reactions   • Ampicillin Other reaction(s): Unknown Allergic Reaction  Reaction Date: [id number];    • Sulfa Antibiotics      Other reaction(s): Unknown Allergic Reaction   • Sulfamethoxazole-Trimethoprim      Reaction Date: [id number];      Immunization History   Administered Date(s) Administered   • COVID-19 PFIZER VACCINE 0.3 ML IM 02/10/2021, 03/03/2021, 11/29/2021   • COVID-19 Pfizer vac (Kang-sucrose, gray cap) 12 yr+ IM 06/08/2022   • DTaP 5 05/14/2014   • INFLUENZA 11/08/2015   • Influenza Quadrivalent, 6-35 Months IM 09/28/2017   • Influenza Split High Dose Preservative Free IM 09/23/2013, 09/11/2014, 09/08/2016   • Influenza, high dose seasonal 0.7 mL 10/01/2018, 10/24/2019, 10/22/2020, 11/09/2021, 10/09/2023   • Influenza, seasonal, injectable 10/01/2010   • Pneumococcal Conjugate 13-Valent 12/03/2014   • Pneumococcal Polysaccharide PPV23 02/04/2019       Objective     /74 (BP Location: Left arm, Patient Position: Sitting, Cuff Size: Standard)   Pulse 96   Temp 98.9 °F (37.2 °C)   Ht 5' 6" (1.676 m)   Wt 78.5 kg (173 lb)   LMP  (LMP Unknown)   SpO2 95%   BMI 27.92 kg/m²     Physical Exam  Vitals and nursing note reviewed. Constitutional:       General: She is not in acute distress. Appearance: Normal appearance. She is not ill-appearing, toxic-appearing or diaphoretic. Comments: Pleasant, cheerful 80-year-old female who is awake alert in no acute distress oriented x3. Walking with a cane. Much brighter than previous evaluation   HENT:      Head: Normocephalic and atraumatic. Right Ear: Tympanic membrane, ear canal and external ear normal. There is no impacted cerumen. Left Ear: Tympanic membrane, ear canal and external ear normal. There is no impacted cerumen. Nose: Nose normal. No congestion or rhinorrhea. Mouth/Throat:      Mouth: Mucous membranes are moist.      Pharynx: Oropharynx is clear. No oropharyngeal exudate or posterior oropharyngeal erythema.    Eyes:      General: No scleral icterus. Right eye: No discharge. Left eye: No discharge. Extraocular Movements: Extraocular movements intact. Conjunctiva/sclera: Conjunctivae normal.      Pupils: Pupils are equal, round, and reactive to light. Neck:      Vascular: No carotid bruit. Comments: Tenderness to the neck lateral scalene musculature on the right. Full range of motion without pain  Cardiovascular:      Rate and Rhythm: Normal rate and regular rhythm. Heart sounds: Normal heart sounds. No murmur heard. No friction rub. No gallop. Pulmonary:      Effort: Pulmonary effort is normal. No respiratory distress. Breath sounds: Normal breath sounds. No stridor. No wheezing, rhonchi or rales. Chest:      Chest wall: No tenderness. Abdominal:      General: Bowel sounds are normal. There is no distension. Palpations: Abdomen is soft. There is no mass. Tenderness: There is no abdominal tenderness. There is no right CVA tenderness, left CVA tenderness, guarding or rebound. Hernia: No hernia is present. Musculoskeletal:         General: Tenderness present. No swelling, deformity or signs of injury. Cervical back: Normal range of motion and neck supple. Tenderness present. No rigidity. Right lower leg: Edema present. Left lower leg: Edema present. Comments: Chronic venous stasis +1 edema bilateral lower extremities. Decreasing pain tenderness to palpation anterior thigh on the right. States that she is feeling much better overall   Lymphadenopathy:      Cervical: No cervical adenopathy. Skin:     General: Skin is warm and dry. Coloration: Skin is not jaundiced or pale. Findings: No bruising, erythema, lesion or rash. Comments: Patient does have a crusted lesion midline to her nares. Will be seeing her dermatologist for this. Does not have increasing vascularity.   Seems like a verrucous   Neurological:      Mental Status: She is alert and oriented to person, place, and time. Mental status is at baseline. Cranial Nerves: No cranial nerve deficit. Sensory: No sensory deficit. Motor: No weakness. Coordination: Coordination normal.      Gait: Gait normal.      Deep Tendon Reflexes: Reflexes normal.   Psychiatric:         Mood and Affect: Mood normal.         Behavior: Behavior normal.         Thought Content:  Thought content normal.         Judgment: Judgment normal.   Naga Su, DO

## 2023-11-13 NOTE — ASSESSMENT & PLAN NOTE
History of hypertension. As noted blood pressure is showing adequate control with present treatment. Patient will continue present medication and surveillance and we will continue to monitor her blood pressure and her kidney function and make changes to medication in the future if necessary. With her hospitalization there were some changes of medication now on nifedipine and losartan.

## 2023-11-14 NOTE — TELEPHONE ENCOUNTER
The scripts were sent to the mail in and the local pharmacy for the Gabapentin. Do you know which one was denied.

## 2023-11-15 DIAGNOSIS — M54.2 CERVICAL MUSCLE PAIN: Primary | ICD-10-CM

## 2023-11-15 DIAGNOSIS — M79.604 ANTERIOR LEG PAIN, RIGHT: ICD-10-CM

## 2023-11-15 RX ORDER — GABAPENTIN 100 MG/1
100 CAPSULE ORAL 3 TIMES DAILY
Qty: 90 CAPSULE | Refills: 0 | Status: SHIPPED | OUTPATIENT
Start: 2023-11-15

## 2023-11-15 RX ORDER — GABAPENTIN 100 MG/1
100 CAPSULE ORAL 3 TIMES DAILY
Qty: 270 CAPSULE | Refills: 2 | Status: SHIPPED | OUTPATIENT
Start: 2023-11-15

## 2023-12-18 DIAGNOSIS — F41.9 ANXIETY: ICD-10-CM

## 2023-12-18 DIAGNOSIS — I10 ESSENTIAL HYPERTENSION: ICD-10-CM

## 2023-12-18 RX ORDER — POTASSIUM CHLORIDE 750 MG/1
TABLET, EXTENDED RELEASE ORAL
Qty: 90 TABLET | Refills: 3 | Status: SHIPPED | OUTPATIENT
Start: 2023-12-18

## 2023-12-18 RX ORDER — CITALOPRAM 40 MG/1
TABLET ORAL
Qty: 90 TABLET | Refills: 3 | Status: SHIPPED | OUTPATIENT
Start: 2023-12-18

## 2024-02-21 PROBLEM — Z00.00 MEDICARE ANNUAL WELLNESS VISIT, SUBSEQUENT: Status: RESOLVED | Noted: 2019-10-24 | Resolved: 2024-02-21

## 2024-02-22 DIAGNOSIS — E78.00 PURE HYPERCHOLESTEROLEMIA: ICD-10-CM

## 2024-02-22 RX ORDER — ATORVASTATIN CALCIUM 10 MG/1
10 TABLET, FILM COATED ORAL DAILY
Qty: 90 TABLET | Refills: 3 | Status: SHIPPED | OUTPATIENT
Start: 2024-02-22

## 2024-02-27 ENCOUNTER — APPOINTMENT (OUTPATIENT)
Dept: LAB | Age: 89
End: 2024-02-27
Payer: MEDICARE

## 2024-02-27 ENCOUNTER — OFFICE VISIT (OUTPATIENT)
Dept: INTERNAL MEDICINE CLINIC | Facility: CLINIC | Age: 89
End: 2024-02-27
Payer: MEDICARE

## 2024-02-27 ENCOUNTER — HOSPITAL ENCOUNTER (OUTPATIENT)
Dept: RADIOLOGY | Age: 89
Discharge: HOME | End: 2024-02-27
Payer: MEDICARE

## 2024-02-27 VITALS
DIASTOLIC BLOOD PRESSURE: 80 MMHG | HEART RATE: 85 BPM | WEIGHT: 170 LBS | TEMPERATURE: 98.9 F | OXYGEN SATURATION: 94 % | BODY MASS INDEX: 27.32 KG/M2 | SYSTOLIC BLOOD PRESSURE: 136 MMHG | HEIGHT: 66 IN

## 2024-02-27 DIAGNOSIS — R73.9 HYPERGLYCEMIA: ICD-10-CM

## 2024-02-27 DIAGNOSIS — I10 PRIMARY HYPERTENSION: ICD-10-CM

## 2024-02-27 DIAGNOSIS — Z00.00 MEDICARE ANNUAL WELLNESS VISIT, SUBSEQUENT: ICD-10-CM

## 2024-02-27 DIAGNOSIS — R10.11 RUQ PAIN: Primary | ICD-10-CM

## 2024-02-27 DIAGNOSIS — R10.11 RUQ PAIN: ICD-10-CM

## 2024-02-27 DIAGNOSIS — M81.0 AGE-RELATED OSTEOPOROSIS WITHOUT CURRENT PATHOLOGICAL FRACTURE: ICD-10-CM

## 2024-02-27 LAB
ALBUMIN SERPL BCP-MCNC: 4.2 G/DL (ref 3.5–5)
ALP SERPL-CCNC: 50 U/L (ref 34–104)
ALT SERPL W P-5'-P-CCNC: 4 U/L (ref 7–52)
ANION GAP SERPL CALCULATED.3IONS-SCNC: 9 MMOL/L
AST SERPL W P-5'-P-CCNC: 15 U/L (ref 13–39)
BACTERIA UR QL AUTO: ABNORMAL /HPF
BASOPHILS # BLD AUTO: 0.02 THOUSANDS/ÂΜL (ref 0–0.1)
BASOPHILS NFR BLD AUTO: 0 % (ref 0–1)
BILIRUB SERPL-MCNC: 0.7 MG/DL (ref 0.2–1)
BILIRUB UR QL STRIP: NEGATIVE
BUN SERPL-MCNC: 12 MG/DL (ref 5–25)
CALCIUM SERPL-MCNC: 9.5 MG/DL (ref 8.4–10.2)
CHLORIDE SERPL-SCNC: 99 MMOL/L (ref 96–108)
CHOLEST SERPL-MCNC: 148 MG/DL
CLARITY UR: ABNORMAL
CO2 SERPL-SCNC: 30 MMOL/L (ref 21–32)
COLOR UR: ABNORMAL
CREAT SERPL-MCNC: 0.77 MG/DL (ref 0.6–1.3)
EOSINOPHIL # BLD AUTO: 0.03 THOUSAND/ÂΜL (ref 0–0.61)
EOSINOPHIL NFR BLD AUTO: 0 % (ref 0–6)
ERYTHROCYTE [DISTWIDTH] IN BLOOD BY AUTOMATED COUNT: 12.1 % (ref 11.6–15.1)
EST. AVERAGE GLUCOSE BLD GHB EST-MCNC: 105 MG/DL
GFR SERPL CREATININE-BSD FRML MDRD: 69 ML/MIN/1.73SQ M
GLUCOSE P FAST SERPL-MCNC: 105 MG/DL (ref 65–99)
GLUCOSE UR STRIP-MCNC: NEGATIVE MG/DL
HBA1C MFR BLD: 5.3 %
HCT VFR BLD AUTO: 37.6 % (ref 34.8–46.1)
HDLC SERPL-MCNC: 72 MG/DL
HGB BLD-MCNC: 12.3 G/DL (ref 11.5–15.4)
HGB UR QL STRIP.AUTO: ABNORMAL
IMM GRANULOCYTES # BLD AUTO: 0.02 THOUSAND/UL (ref 0–0.2)
IMM GRANULOCYTES NFR BLD AUTO: 0 % (ref 0–2)
KETONES UR STRIP-MCNC: NEGATIVE MG/DL
LDLC SERPL CALC-MCNC: 65 MG/DL (ref 0–100)
LEUKOCYTE ESTERASE UR QL STRIP: ABNORMAL
LYMPHOCYTES # BLD AUTO: 2.09 THOUSANDS/ÂΜL (ref 0.6–4.47)
LYMPHOCYTES NFR BLD AUTO: 27 % (ref 14–44)
MCH RBC QN AUTO: 31.9 PG (ref 26.8–34.3)
MCHC RBC AUTO-ENTMCNC: 32.7 G/DL (ref 31.4–37.4)
MCV RBC AUTO: 98 FL (ref 82–98)
MONOCYTES # BLD AUTO: 0.56 THOUSAND/ÂΜL (ref 0.17–1.22)
MONOCYTES NFR BLD AUTO: 7 % (ref 4–12)
MUCOUS THREADS UR QL AUTO: ABNORMAL
NEUTROPHILS # BLD AUTO: 5.12 THOUSANDS/ÂΜL (ref 1.85–7.62)
NEUTS SEG NFR BLD AUTO: 66 % (ref 43–75)
NITRITE UR QL STRIP: NEGATIVE
NON-SQ EPI CELLS URNS QL MICRO: ABNORMAL /HPF
NONHDLC SERPL-MCNC: 76 MG/DL
NRBC BLD AUTO-RTO: 0 /100 WBCS
PH UR STRIP.AUTO: 7 [PH]
PLATELET # BLD AUTO: 180 THOUSANDS/UL (ref 149–390)
PMV BLD AUTO: 10.8 FL (ref 8.9–12.7)
POTASSIUM SERPL-SCNC: 4 MMOL/L (ref 3.5–5.3)
PROT SERPL-MCNC: 6.4 G/DL (ref 6.4–8.4)
PROT UR STRIP-MCNC: NEGATIVE MG/DL
RBC # BLD AUTO: 3.85 MILLION/UL (ref 3.81–5.12)
RBC #/AREA URNS AUTO: ABNORMAL /HPF
SODIUM SERPL-SCNC: 138 MMOL/L (ref 135–147)
SP GR UR STRIP.AUTO: 1.01 (ref 1–1.03)
TRIGL SERPL-MCNC: 53 MG/DL
UROBILINOGEN UR STRIP-ACNC: <2 MG/DL
WBC # BLD AUTO: 7.84 THOUSAND/UL (ref 4.31–10.16)
WBC #/AREA URNS AUTO: ABNORMAL /HPF

## 2024-02-27 PROCEDURE — 76705 ECHO EXAM OF ABDOMEN: CPT

## 2024-02-27 PROCEDURE — 80061 LIPID PANEL: CPT

## 2024-02-27 PROCEDURE — 85025 COMPLETE CBC W/AUTO DIFF WBC: CPT

## 2024-02-27 PROCEDURE — 36415 COLL VENOUS BLD VENIPUNCTURE: CPT

## 2024-02-27 PROCEDURE — 80053 COMPREHEN METABOLIC PANEL: CPT

## 2024-02-27 PROCEDURE — 81001 URINALYSIS AUTO W/SCOPE: CPT

## 2024-02-27 PROCEDURE — 83036 HEMOGLOBIN GLYCOSYLATED A1C: CPT

## 2024-02-27 PROCEDURE — 99213 OFFICE O/P EST LOW 20 MIN: CPT | Performed by: STUDENT IN AN ORGANIZED HEALTH CARE EDUCATION/TRAINING PROGRAM

## 2024-02-27 PROCEDURE — 96372 THER/PROPH/DIAG INJ SC/IM: CPT | Performed by: STUDENT IN AN ORGANIZED HEALTH CARE EDUCATION/TRAINING PROGRAM

## 2024-02-27 NOTE — PROGRESS NOTES
Name: Ollie Tellez      : 1935      MRN: [id number]  Encounter Provider: Arminda Talbert MD  Encounter Date: 2024   Encounter department: [facility] INTERNAL MEDICINE    Assessment & Plan     1. RUQ pain  Assessment & Plan:  4 day onset. PE significant for exquisite RUQ tenderness on palpation.  Will obtain imaging.   Labs including CMP pending from prior visit. Have advised having them completed at this time.    Orders:  -     US right upper quadrant; Future; Expected date: 2024    2. Primary hypertension  Assessment & Plan:  Controlled on current regimen  Will continue to monitor              Subjective     HPI  Patient presents with 4 day onset of intermittent RUQ pain. Pain has been unchanged and patient cannot attribute pain to PO intake.   Pain unlike anything she has felt before. Rated 5/10 in severity, non radiating. Exacerbated with movement and palpation.   Denies fever, n/v/d.    Review of Systems   Constitutional:  Negative for chills and fever.   HENT:  Negative for congestion, ear pain, rhinorrhea and sinus pain.    Eyes:  Negative for visual disturbance.   Respiratory:  Negative for chest tightness, shortness of breath and wheezing.    Cardiovascular:  Negative for chest pain and palpitations.   Gastrointestinal:  Positive for abdominal pain (as per hpi). Negative for constipation, diarrhea and vomiting.   Endocrine: Negative for polyuria.   Genitourinary:  Negative for dysuria.   Musculoskeletal:  Negative for arthralgias and myalgias.   Neurological:  Negative for dizziness, syncope and light-headedness.       Past Medical History:   Diagnosis Date    Anxiety     GERD (gastroesophageal reflux disease)     Hyperlipidemia     Hypertension     Osteoarthritis      Past Surgical History:   Procedure Laterality Date    CATARACT EXTRACTION       Family History   Problem Relation Age of Onset    Heart failure Mother     Heart disease Mother     Hypertension Father      Hypertension Brother      Social History     Socioeconomic History    Marital status:      Spouse name: None    Number of children: None    Years of education: None    Highest education level: None   Occupational History    None   Tobacco Use    Smoking status: Former    Smokeless tobacco: Never   Vaping Use    Vaping status: Never Used   Substance and Sexual Activity    Alcohol use: No    Drug use: No    Sexual activity: None   Other Topics Concern    None   Social History Narrative    Daily Coffee Consumption (4 Cups/Day)     Social Determinants of Health     Financial Resource Strain: Low Risk  (9/9/2022)    Overall Financial Resource Strain (CARDIA)     Difficulty of Paying Living Expenses: Not very hard   Food Insecurity: Not on file   Transportation Needs: No Transportation Needs (9/9/2022)    PRAPARE - Transportation     Lack of Transportation (Medical): No     Lack of Transportation (Non-Medical): No   Physical Activity: Not on file   Stress: Not on file   Social Connections: Not on file   Intimate Partner Violence: Not on file   Housing Stability: Not on file     Current Outpatient Medications on File Prior to Visit   Medication Sig    aspirin 81 mg chewable tablet Chew Twice daily    atorvastatin (LIPITOR) 10 mg tablet TAKE 1 TABLET DAILY    CALCIUM CARBONATE-VITAMIN D PO Take 1 tablet by mouth daily    citalopram (CeleXA) 40 mg tablet TAKE 1 TABLET DAILY    gabapentin (Neurontin) 100 mg capsule Take 1 capsule (100 mg total) by mouth 3 (three) times a day (Patient taking differently: Take 100 mg by mouth 3 (three) times a day As needed)    LORazepam (ATIVAN) 0.5 mg tablet Take 1 tablet (0.5 mg total) by mouth every 8 (eight) hours as needed for anxiety    losartan (Cozaar) 100 MG tablet Take 1 tablet (100 mg total) by mouth daily    NIFEdipine ER (ADALAT CC) 30 MG 24 hr tablet Take 1 tablet (30 mg total) by mouth daily    potassium chloride (K-DUR,KLOR-CON) 10 mEq tablet TAKE 1 TABLET DAILY     "gabapentin (Neurontin) 100 mg capsule Take 1 capsule (100 mg total) by mouth 3 (three) times a day (Patient not taking: Reported on 2/27/2024)    NIFEdipine (PROCARDIA XL) 30 mg 24 hr tablet Take 1 tablet (30 mg total) by mouth daily    predniSONE 20 mg tablet Take 40 mg by mouth (Patient not taking: Reported on 2/27/2024)     Allergies   Allergen Reactions    Ampicillin      Other reaction(s): Unknown Allergic Reaction  Reaction Date: 01Nov2011;     Sulfa Antibiotics      Other reaction(s): Unknown Allergic Reaction    Sulfamethoxazole-Trimethoprim      Reaction Date: 01Nov2011;      Immunization History   Administered Date(s) Administered    COVID-19 PFIZER VACCINE 0.3 ML IM 02/10/2021, 03/03/2021, 11/29/2021    COVID-19 Pfizer vac (Kang-sucrose, gray cap) 12 yr+ IM 06/08/2022    DTaP 5 05/14/2014    INFLUENZA 11/08/2015    Influenza Quadrivalent, 6-35 Months IM 09/28/2017    Influenza Split High Dose Preservative Free IM 09/23/2013, 09/11/2014, 09/08/2016    Influenza, high dose seasonal 0.7 mL 10/01/2018, 10/24/2019, 10/22/2020, 11/09/2021, 10/09/2023    Influenza, seasonal, injectable 10/01/2010    Pneumococcal Conjugate 13-Valent 12/03/2014    Pneumococcal Polysaccharide PPV23 02/04/2019       Objective     /80 (BP Location: Left arm, Patient Position: Sitting, Cuff Size: Standard)   Pulse 85   Temp 98.9 °F (37.2 °C)   Ht 5' 6\" (1.676 m)   Wt 77.1 kg (170 lb)   LMP  (LMP Unknown)   SpO2 94%   BMI 27.44 kg/m²     Physical Exam  Constitutional:       Appearance: Normal appearance.   HENT:      Head: Normocephalic and atraumatic.      Nose: Nose normal.   Eyes:      Conjunctiva/sclera: Conjunctivae normal.   Cardiovascular:      Rate and Rhythm: Normal rate and regular rhythm.      Heart sounds: Normal heart sounds.   Pulmonary:      Effort: Pulmonary effort is normal.      Breath sounds: Normal breath sounds.   Abdominal:      General: There is no distension.      Palpations: Abdomen is soft. There " is no mass.      Tenderness: There is abdominal tenderness (on deep palpation of RUQ). There is no guarding.   Musculoskeletal:         General: Normal range of motion.      Cervical back: Normal range of motion.      Right lower leg: No edema.      Left lower leg: No edema.   Skin:     General: Skin is warm and dry.   Neurological:      Mental Status: She is alert and oriented to person, place, and time.   Psychiatric:         Behavior: Behavior normal.       Arminda Talbert MD

## 2024-02-27 NOTE — ASSESSMENT & PLAN NOTE
4 day onset. PE significant for exquisite RUQ tenderness on palpation.  Will obtain imaging.   Labs including CMP pending from prior visit. Have advised having them completed at this time.

## 2024-03-01 ENCOUNTER — TELEPHONE (OUTPATIENT)
Dept: INTERNAL MEDICINE CLINIC | Facility: CLINIC | Age: 89
End: 2024-03-01

## 2024-03-01 DIAGNOSIS — R82.71 BACTERIA IN URINE: ICD-10-CM

## 2024-03-01 DIAGNOSIS — R82.71 BACTERIA IN URINE: Primary | ICD-10-CM

## 2024-03-01 RX ORDER — NITROFURANTOIN 25; 75 MG/1; MG/1
100 CAPSULE ORAL 2 TIMES DAILY
Qty: 10 CAPSULE | Refills: 0 | Status: SHIPPED | OUTPATIENT
Start: 2024-03-01 | End: 2024-03-06

## 2024-03-01 NOTE — TELEPHONE ENCOUNTER
Patient left message requesting her blood work results and her ultrasound    Please advise         :  Hi, this is Neena Tellez [id number]. I'm calling for a report on my blood tests and ultrasound that I had on Tuesday this week. My number is [phone]. Thank you very much.

## 2024-03-04 RX ORDER — NITROFURANTOIN 25; 75 MG/1; MG/1
100 CAPSULE ORAL 2 TIMES DAILY
Qty: 10 CAPSULE | Refills: 0 | Status: SHIPPED | OUTPATIENT
Start: 2024-03-04 | End: 2024-03-09

## 2024-03-05 ENCOUNTER — TELEPHONE (OUTPATIENT)
Dept: INTERNAL MEDICINE CLINIC | Facility: CLINIC | Age: 89
End: 2024-03-05

## 2024-03-05 DIAGNOSIS — K76.89 LIVER CYST: Primary | ICD-10-CM

## 2024-03-11 NOTE — ASSESSMENT & PLAN NOTE
Blood pressure is mildly elevated today but patient is very anxious about her medical condition  At this point we will continue present medication and check this again with her next visit in the next few months 
Patient is here today complaining of problems with urinary hesitancy, decreased urination  She states this is been going on for approximately 1 week  She does try to keep herself well hydrated  She is has no abdominal pain or cramping, dysuria, back pain related to this  She further denies any fever or chills  On evaluation of her urine it does show large amount of leukocytes, trace red blood cells  Presumptive diagnosis this point time is possible urinary tract infection  Patient's urine will be sent for culture and she was placed on Macrobid 1 pill twice a day for 5 days  She is to call if not improving    If patient still is symptomatic would consider ultrasound of the kidneys ureter bladder
Patient is very proud of the fact that she has been on a new diet and is losing weight  She is down approximately 9 lb since October of last year  Patient was told to continue with present diet, and hopefully with the warmer weather she will get more regular exercise  We will check her weight with her next upcoming visit 
12-Mar-2024 02:57

## 2024-03-23 ENCOUNTER — HOSPITAL ENCOUNTER (OUTPATIENT)
Dept: MRI IMAGING | Facility: HOSPITAL | Age: 89
Discharge: HOME | End: 2024-03-23
Payer: MEDICARE

## 2024-03-23 DIAGNOSIS — K76.89 LIVER CYST: ICD-10-CM

## 2024-03-23 PROCEDURE — A9585 GADOBUTROL INJECTION: HCPCS | Performed by: INTERNAL MEDICINE

## 2024-03-23 PROCEDURE — 74183 MRI ABD W/O CNTR FLWD CNTR: CPT

## 2024-03-23 RX ORDER — GADOBUTROL 604.72 MG/ML
8 INJECTION INTRAVENOUS
Status: COMPLETED | OUTPATIENT
Start: 2024-03-23 | End: 2024-03-23

## 2024-03-23 RX ADMIN — GADOBUTROL 8 ML: 604.72 INJECTION INTRAVENOUS at 16:47

## 2024-04-04 ENCOUNTER — TELEPHONE (OUTPATIENT)
Dept: INTERNAL MEDICINE CLINIC | Facility: CLINIC | Age: 89
End: 2024-04-04

## 2024-04-04 NOTE — TELEPHONE ENCOUNTER
Pt  would like to know the results of her MRI  Please advise   Pt can be reached at  [phone]

## 2024-04-05 NOTE — TELEPHONE ENCOUNTER
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  Patient states that she completed the antibiotic for her urinary tract infection.  No new complaints or concerns.  Feeling back to normal.  Will be seen in the office in June

## 2024-06-12 ENCOUNTER — OFFICE VISIT (OUTPATIENT)
Dept: INTERNAL MEDICINE CLINIC | Facility: CLINIC | Age: 89
End: 2024-06-12
Payer: MEDICARE

## 2024-06-12 VITALS
SYSTOLIC BLOOD PRESSURE: 150 MMHG | HEART RATE: 80 BPM | WEIGHT: 170 LBS | BODY MASS INDEX: 27.32 KG/M2 | DIASTOLIC BLOOD PRESSURE: 80 MMHG | OXYGEN SATURATION: 97 % | HEIGHT: 66 IN

## 2024-06-12 DIAGNOSIS — M79.604 ANTERIOR LEG PAIN, RIGHT: ICD-10-CM

## 2024-06-12 DIAGNOSIS — M54.2 CERVICAL MUSCLE PAIN: ICD-10-CM

## 2024-06-12 DIAGNOSIS — I10 PRIMARY HYPERTENSION: ICD-10-CM

## 2024-06-12 DIAGNOSIS — R60.0 LOWER EXTREMITY EDEMA: Primary | ICD-10-CM

## 2024-06-12 DIAGNOSIS — F41.9 ANXIETY: ICD-10-CM

## 2024-06-12 DIAGNOSIS — E78.01 FAMILIAL HYPERCHOLESTEROLEMIA: ICD-10-CM

## 2024-06-12 PROCEDURE — 99214 OFFICE O/P EST MOD 30 MIN: CPT | Performed by: INTERNAL MEDICINE

## 2024-06-12 PROCEDURE — G2211 COMPLEX E/M VISIT ADD ON: HCPCS | Performed by: INTERNAL MEDICINE

## 2024-06-12 RX ORDER — LORAZEPAM 0.5 MG/1
0.5 TABLET ORAL EVERY 8 HOURS PRN
Qty: 90 TABLET | Refills: 2 | Status: SHIPPED | OUTPATIENT
Start: 2024-06-12

## 2024-06-12 RX ORDER — FUROSEMIDE 20 MG/1
20 TABLET ORAL DAILY
Qty: 30 TABLET | Refills: 5 | Status: SHIPPED | OUTPATIENT
Start: 2024-06-12

## 2024-06-12 RX ORDER — GABAPENTIN 100 MG/1
100 CAPSULE ORAL 3 TIMES DAILY
Qty: 270 CAPSULE | Refills: 2 | Status: SHIPPED | OUTPATIENT
Start: 2024-06-12

## 2024-06-12 NOTE — PROGRESS NOTES
Ambulatory Visit  Name: Ollie Tellez      : 1935      MRN: [id number]  Encounter Provider: Eusebio Potts DO  Encounter Date: 2024   Encounter department: [facility] INTERNAL MEDICINE    Assessment & Plan   1. Lower extremity edema  Assessment & Plan:  Patient is complaining of increasing swelling to both lower extremities.  States it does go down somewhat during the night but as she ambulates during the day gets more pronounced.  She is wearing support hose both lower extremities and interestingly has noted blood pressure is slightly elevated today.  We feel it is appropriate to place her on a diuretic and patient was placed on low-dose of furosemide at 20 mg to take daily as needed    We have continued concerns about her kidney function and potassium and she will have this checked in 2 weeks.  Call the office for an update of her condition next week.  Orders:  -     furosemide (LASIX) 20 mg tablet; Take 1 tablet (20 mg total) by mouth daily  2. Familial hypercholesterolemia  Assessment & Plan:  History of hyperlipidemia.  Patient remains on atorvastatin 10 mg daily.  She admits she is not always perfect with her diet and we will continue to to monitor her cholesterol level make adjustment to patient in future if indicated.  Orders:  -     Lipid panel; Future; Expected date: 2024  3. Primary hypertension  Assessment & Plan:  Blood pressure is moderately elevated today in the office    This may be secondary to her fluid overload and has noted we started her on a low-dose of Lasix 20 mg daily.  She has an appointment to be seen next month to get a Prolia injection and we will check blood pressure at that time and hopefully this will show some improvement.  Continue to monitor renal function      Orders:  -     Basic metabolic panel; Future; Expected date: 2024  4. Cervical muscle pain  -     gabapentin (Neurontin) 100 mg capsule; Take 1 capsule (100 mg total) by mouth 3 (three)  times a day  5. Anterior leg pain, right  Assessment & Plan:  Patient does have a history of chronic arthritis.  She remains on gabapentin and new prescription was sent to the pharmacy.  She states this helps with the pain but we feel this may also increase the amount of edema to lower extremities.  Orders:  -     gabapentin (Neurontin) 100 mg capsule; Take 1 capsule (100 mg total) by mouth 3 (three) times a day  6. Anxiety  Assessment & Plan:  History of anxiety disorder.  Patient was given another prescription for Ativan to take as needed.  She uses this rarely.  Orders:  -     LORazepam (ATIVAN) 0.5 mg tablet; Take 1 tablet (0.5 mg total) by mouth every 8 (eight) hours as needed for anxiety         History of Present Illness     Patient is an 88-year-old female history of medical problems outlined previously who is here today for a routine follow-up.  Patient states her only complaint at this point in time is increase in her lower extremity edema.  This has been a chronic issue with the patient and seems to be getting worse.  Denies any shortness of breath with exertion and no chest pain or pressure.  She continues to live with her daughter in [facility] and does commute to her office for appointments      Review of Systems   Constitutional: Negative.    HENT: Negative.     Eyes: Negative.    Respiratory: Negative.     Cardiovascular:  Positive for leg swelling. Negative for chest pain and palpitations.   Gastrointestinal: Negative.    Endocrine: Negative.    Genitourinary: Negative.    Musculoskeletal:  Positive for arthralgias. Negative for back pain, gait problem, joint swelling, myalgias, neck pain and neck stiffness.   Skin: Negative.    Allergic/Immunologic: Negative.    Neurological: Negative.    Hematological: Negative.    Psychiatric/Behavioral: Negative.       Past Medical History:   Diagnosis Date    Anxiety     GERD (gastroesophageal reflux disease)     Hyperlipidemia     Hypertension      Osteoarthritis      Past Surgical History:   Procedure Laterality Date    CATARACT EXTRACTION       Family History   Problem Relation Age of Onset    Heart failure Mother     Heart disease Mother     Hypertension Father     Hypertension Brother      Social History     Tobacco Use    Smoking status: Former    Smokeless tobacco: Never   Vaping Use    Vaping status: Never Used   Substance and Sexual Activity    Alcohol use: No    Drug use: No    Sexual activity: Not on file     Current Outpatient Medications on File Prior to Visit   Medication Sig    aspirin 81 mg chewable tablet Chew Twice daily    atorvastatin (LIPITOR) 10 mg tablet TAKE 1 TABLET DAILY    CALCIUM CARBONATE-VITAMIN D PO Take 1 tablet by mouth daily    citalopram (CeleXA) 40 mg tablet TAKE 1 TABLET DAILY    gabapentin (Neurontin) 100 mg capsule Take 1 capsule (100 mg total) by mouth 3 (three) times a day (Patient taking differently: Take 100 mg by mouth 3 (three) times a day As needed)    losartan (Cozaar) 100 MG tablet Take 1 tablet (100 mg total) by mouth daily    NIFEdipine ER (ADALAT CC) 30 MG 24 hr tablet Take 1 tablet (30 mg total) by mouth daily    potassium chloride (K-DUR,KLOR-CON) 10 mEq tablet TAKE 1 TABLET DAILY    [DISCONTINUED] LORazepam (ATIVAN) 0.5 mg tablet Take 1 tablet (0.5 mg total) by mouth every 8 (eight) hours as needed for anxiety    NIFEdipine (PROCARDIA XL) 30 mg 24 hr tablet Take 1 tablet (30 mg total) by mouth daily    [DISCONTINUED] gabapentin (Neurontin) 100 mg capsule Take 1 capsule (100 mg total) by mouth 3 (three) times a day (Patient not taking: Reported on 2/27/2024)    [DISCONTINUED] predniSONE 20 mg tablet Take 40 mg by mouth (Patient not taking: Reported on 2/27/2024)     Allergies   Allergen Reactions    Ampicillin      Other reaction(s): Unknown Allergic Reaction  Reaction Date: 01Nov2011;     Sulfa Antibiotics      Other reaction(s): Unknown Allergic Reaction    Sulfamethoxazole-Trimethoprim      Reaction Date:  "01Nov2011;      Immunization History   Administered Date(s) Administered    COVID-19 PFIZER VACCINE 0.3 ML IM 02/10/2021, 03/03/2021, 11/29/2021    COVID-19 Pfizer vac (Kang-sucrose, gray cap) 12 yr+ IM 06/08/2022    DTaP 5 05/14/2014    INFLUENZA 11/08/2015    Influenza Quadrivalent, 6-35 Months IM 09/28/2017    Influenza Split High Dose Preservative Free IM 09/23/2013, 09/11/2014, 09/08/2016    Influenza, high dose seasonal 0.7 mL 10/01/2018, 10/24/2019, 10/22/2020, 11/09/2021, 10/09/2023    Influenza, seasonal, injectable 10/01/2010    Pneumococcal Conjugate 13-Valent 12/03/2014    Pneumococcal Polysaccharide PPV23 02/04/2019     Objective     /80   Pulse 80   Ht 5' 6\" (1.676 m)   Wt 77.1 kg (170 lb)   LMP  (LMP Unknown)   SpO2 97%   BMI 27.44 kg/m²     Physical Exam  Vitals and nursing note reviewed.   Constitutional:       General: She is not in acute distress.     Appearance: Normal appearance. She is not ill-appearing, toxic-appearing or diaphoretic.      Comments: Pleasant articulate overweight 88-year-old female who is awake alert in no acute distress oriented x 3   HENT:      Head: Normocephalic and atraumatic.      Right Ear: Tympanic membrane, ear canal and external ear normal. There is no impacted cerumen.      Left Ear: Tympanic membrane, ear canal and external ear normal. There is no impacted cerumen.      Nose: Nose normal. No congestion or rhinorrhea.      Mouth/Throat:      Mouth: Mucous membranes are moist.      Pharynx: Oropharynx is clear. No oropharyngeal exudate or posterior oropharyngeal erythema.   Eyes:      General: No scleral icterus.        Right eye: No discharge.         Left eye: No discharge.      Extraocular Movements: Extraocular movements intact.      Conjunctiva/sclera: Conjunctivae normal.      Pupils: Pupils are equal, round, and reactive to light.   Neck:      Vascular: No carotid bruit.   Cardiovascular:      Rate and Rhythm: Normal rate and regular rhythm.      " Heart sounds: No murmur heard.     No friction rub. No gallop.   Pulmonary:      Effort: Pulmonary effort is normal. No respiratory distress.      Breath sounds: Normal breath sounds. No stridor. No wheezing, rhonchi or rales.   Chest:      Chest wall: No tenderness.   Abdominal:      General: Bowel sounds are normal. There is no distension.      Palpations: Abdomen is soft. There is no mass.      Tenderness: There is no abdominal tenderness. There is no right CVA tenderness, left CVA tenderness, guarding or rebound.      Hernia: No hernia is present.   Musculoskeletal:         General: Deformity present. No swelling, tenderness or signs of injury.      Cervical back: Normal range of motion and neck supple. No rigidity or tenderness.      Right lower leg: Edema present.      Left lower leg: Edema present.      Comments: Patient on examination +2 edema bilateral lower extremities which extends up into the mid thighs bilaterally.  Patient states it is slowly been increasing over the past few months..  Patient also has diffuse arthritic changes as noted previously but nothing acute   Lymphadenopathy:      Cervical: No cervical adenopathy.   Skin:     General: Skin is warm and dry.      Coloration: Skin is not jaundiced or pale.      Findings: No bruising, erythema, lesion or rash.   Neurological:      General: No focal deficit present.      Mental Status: She is alert and oriented to person, place, and time. Mental status is at baseline.      Cranial Nerves: No cranial nerve deficit.      Sensory: No sensory deficit.      Motor: No weakness.      Coordination: Coordination normal.      Gait: Gait normal.      Deep Tendon Reflexes: Reflexes normal.   Psychiatric:         Mood and Affect: Mood normal.         Behavior: Behavior normal.         Thought Content: Thought content normal.         Judgment: Judgment normal.       Administrative Statements

## 2024-06-12 NOTE — ASSESSMENT & PLAN NOTE
History of anxiety disorder.  Patient was given another prescription for Ativan to take as needed.  She uses this rarely.

## 2024-06-12 NOTE — ASSESSMENT & PLAN NOTE
Patient is complaining of increasing swelling to both lower extremities.  States it does go down somewhat during the night but as she ambulates during the day gets more pronounced.  She is wearing support hose both lower extremities and interestingly has noted blood pressure is slightly elevated today.  We feel it is appropriate to place her on a diuretic and patient was placed on low-dose of furosemide at 20 mg to take daily as needed    We have continued concerns about her kidney function and potassium and she will have this checked in 2 weeks.  Call the office for an update of her condition next week.

## 2024-06-12 NOTE — ASSESSMENT & PLAN NOTE
Patient does have a history of chronic arthritis.  She remains on gabapentin and new prescription was sent to the pharmacy.  She states this helps with the pain but we feel this may also increase the amount of edema to lower extremities.

## 2024-06-12 NOTE — ASSESSMENT & PLAN NOTE
History of hyperlipidemia.  Patient remains on atorvastatin 10 mg daily.  She admits she is not always perfect with her diet and we will continue to to monitor her cholesterol level make adjustment to patient in future if indicated.

## 2024-06-12 NOTE — ASSESSMENT & PLAN NOTE
Blood pressure is moderately elevated today in the office    This may be secondary to her fluid overload and has noted we started her on a low-dose of Lasix 20 mg daily.  She has an appointment to be seen next month to get a Prolia injection and we will check blood pressure at that time and hopefully this will show some improvement.  Continue to monitor renal function

## 2024-06-20 DIAGNOSIS — I10 PRIMARY HYPERTENSION: Primary | ICD-10-CM

## 2024-06-20 NOTE — ASSESSMENT & PLAN NOTE
Patient recently seen with problems with lower extremity edema which has gotten worse.  We did place the patient on Lasix 20 mg daily.  She states that she has been losing a lot of weight approximately 6 pounds since her last visit.  Is scheduled to have kidney function checked tomorrow.  We will call if there is any abnormalities that we need to address.  She does feel better overall with less shortness of breath with exertion.

## 2024-06-21 ENCOUNTER — APPOINTMENT (OUTPATIENT)
Dept: LAB | Facility: HOSPITAL | Age: 89
End: 2024-06-21
Payer: MEDICARE

## 2024-06-21 DIAGNOSIS — I10 PRIMARY HYPERTENSION: ICD-10-CM

## 2024-06-21 LAB
ANION GAP SERPL CALCULATED.3IONS-SCNC: 11 MMOL/L (ref 4–13)
BUN SERPL-MCNC: 11 MG/DL (ref 5–25)
CALCIUM SERPL-MCNC: 9.1 MG/DL (ref 8.4–10.2)
CHLORIDE SERPL-SCNC: 96 MMOL/L (ref 96–108)
CO2 SERPL-SCNC: 29 MMOL/L (ref 21–32)
CREAT SERPL-MCNC: 0.79 MG/DL (ref 0.6–1.3)
GFR SERPL CREATININE-BSD FRML MDRD: 67 ML/MIN/1.73SQ M
GLUCOSE P FAST SERPL-MCNC: 100 MG/DL (ref 65–99)
POTASSIUM SERPL-SCNC: 4.1 MMOL/L (ref 3.5–5.3)
SODIUM SERPL-SCNC: 136 MMOL/L (ref 135–147)

## 2024-06-21 PROCEDURE — 36415 COLL VENOUS BLD VENIPUNCTURE: CPT

## 2024-06-21 PROCEDURE — 80048 BASIC METABOLIC PNL TOTAL CA: CPT

## 2024-07-03 ENCOUNTER — TELEPHONE (OUTPATIENT)
Age: 89
End: 2024-07-03

## 2024-07-03 NOTE — TELEPHONE ENCOUNTER
Patient calling requesting to speak with Dr. Potts.    Advised patient provider out on office until Monday 7/8/24.    Please review.  Thank You.

## 2024-07-26 DIAGNOSIS — R60.0 LOWER EXTREMITY EDEMA: Primary | ICD-10-CM

## 2024-07-26 RX ORDER — FUROSEMIDE 20 MG/1
20 TABLET ORAL DAILY
Qty: 100 TABLET | Refills: 3 | Status: SHIPPED | OUTPATIENT
Start: 2024-07-26

## 2024-08-28 ENCOUNTER — CLINICAL SUPPORT (OUTPATIENT)
Dept: INTERNAL MEDICINE CLINIC | Facility: CLINIC | Age: 89
End: 2024-08-28

## 2024-08-28 DIAGNOSIS — M81.0 AGE-RELATED OSTEOPOROSIS WITHOUT CURRENT PATHOLOGICAL FRACTURE: Primary | ICD-10-CM

## 2024-09-16 ENCOUNTER — APPOINTMENT (OUTPATIENT)
Dept: LAB | Facility: HOSPITAL | Age: 89
End: 2024-09-16
Payer: MEDICARE

## 2024-09-16 DIAGNOSIS — E78.01 FAMILIAL HYPERCHOLESTEROLEMIA: ICD-10-CM

## 2024-09-16 DIAGNOSIS — I10 PRIMARY HYPERTENSION: ICD-10-CM

## 2024-09-16 LAB
ANION GAP SERPL CALCULATED.3IONS-SCNC: 9 MMOL/L (ref 4–13)
BUN SERPL-MCNC: 12 MG/DL (ref 5–25)
CALCIUM SERPL-MCNC: 9.2 MG/DL (ref 8.4–10.2)
CHLORIDE SERPL-SCNC: 96 MMOL/L (ref 96–108)
CHOLEST SERPL-MCNC: 139 MG/DL
CO2 SERPL-SCNC: 29 MMOL/L (ref 21–32)
CREAT SERPL-MCNC: 0.81 MG/DL (ref 0.6–1.3)
GFR SERPL CREATININE-BSD FRML MDRD: 64 ML/MIN/1.73SQ M
GLUCOSE P FAST SERPL-MCNC: 106 MG/DL (ref 65–99)
HDLC SERPL-MCNC: 65 MG/DL
LDLC SERPL CALC-MCNC: 61 MG/DL (ref 0–100)
NONHDLC SERPL-MCNC: 74 MG/DL
POTASSIUM SERPL-SCNC: 3.9 MMOL/L (ref 3.5–5.3)
SODIUM SERPL-SCNC: 134 MMOL/L (ref 135–147)
TRIGL SERPL-MCNC: 67 MG/DL

## 2024-09-16 PROCEDURE — 80061 LIPID PANEL: CPT

## 2024-09-16 PROCEDURE — 80048 BASIC METABOLIC PNL TOTAL CA: CPT

## 2024-09-16 PROCEDURE — 36415 COLL VENOUS BLD VENIPUNCTURE: CPT

## 2024-09-19 DIAGNOSIS — F41.9 ANXIETY: ICD-10-CM

## 2024-09-19 RX ORDER — LORAZEPAM 0.5 MG/1
0.5 TABLET ORAL EVERY 8 HOURS PRN
Qty: 90 TABLET | Refills: 0 | Status: SHIPPED | OUTPATIENT
Start: 2024-09-19

## 2024-09-19 NOTE — TELEPHONE ENCOUNTER
Reason for call:   [x] Refill   [] Prior Auth  [] Other:     Office:   [x] PCP/Provider -   [] Specialty/Provider -     Medication: LORazepam (ATIVAN) 0.5 mg tablet     Dose/Frequency: Take 1 tablet (0.5 mg total) by mouth every 8 (eight) hours as needed for anxiety     Quantity: 90    Pharmacy: [facility] - [address]     Does the patient have enough for 3 days?   [x] Yes   [] No - Send as HP to POD

## 2024-09-27 ENCOUNTER — APPOINTMENT (EMERGENCY)
Dept: RADIOLOGY | Facility: HOSPITAL | Age: 89
End: 2024-09-27
Payer: MEDICARE

## 2024-09-27 ENCOUNTER — APPOINTMENT (EMERGENCY)
Dept: CT IMAGING | Facility: HOSPITAL | Age: 89
End: 2024-09-27
Payer: MEDICARE

## 2024-09-27 ENCOUNTER — HOSPITAL ENCOUNTER (EMERGENCY)
Facility: HOSPITAL | Age: 89
Discharge: HOME | End: 2024-09-27
Attending: EMERGENCY MEDICINE
Payer: MEDICARE

## 2024-09-27 VITALS
DIASTOLIC BLOOD PRESSURE: 77 MMHG | TEMPERATURE: 98.3 F | SYSTOLIC BLOOD PRESSURE: 156 MMHG | OXYGEN SATURATION: 93 % | HEART RATE: 71 BPM | RESPIRATION RATE: 22 BRPM

## 2024-09-27 DIAGNOSIS — K76.89 LIVER CYST: ICD-10-CM

## 2024-09-27 DIAGNOSIS — K57.92 DIVERTICULITIS: Primary | ICD-10-CM

## 2024-09-27 DIAGNOSIS — K44.9 HIATAL HERNIA: ICD-10-CM

## 2024-09-27 LAB
ALBUMIN SERPL BCG-MCNC: 4.5 G/DL (ref 3.5–5)
ALP SERPL-CCNC: 46 U/L (ref 34–104)
ALT SERPL W P-5'-P-CCNC: 10 U/L (ref 7–52)
ANION GAP SERPL CALCULATED.3IONS-SCNC: 10 MMOL/L (ref 4–13)
AST SERPL W P-5'-P-CCNC: 22 U/L (ref 13–39)
ATRIAL RATE: 77 BPM
BASOPHILS # BLD AUTO: 0.01 THOUSANDS/ΜL (ref 0–0.1)
BASOPHILS NFR BLD AUTO: 0 % (ref 0–1)
BILIRUB SERPL-MCNC: 0.81 MG/DL (ref 0.2–1)
BILIRUB UR QL STRIP: NEGATIVE
BUN SERPL-MCNC: 7 MG/DL (ref 5–25)
CALCIUM SERPL-MCNC: 9.7 MG/DL (ref 8.4–10.2)
CHLORIDE SERPL-SCNC: 101 MMOL/L (ref 96–108)
CLARITY UR: CLEAR
CO2 SERPL-SCNC: 27 MMOL/L (ref 21–32)
COLOR UR: COLORLESS
CREAT SERPL-MCNC: 0.78 MG/DL (ref 0.6–1.3)
EOSINOPHIL # BLD AUTO: 0.04 THOUSAND/ΜL (ref 0–0.61)
EOSINOPHIL NFR BLD AUTO: 1 % (ref 0–6)
ERYTHROCYTE [DISTWIDTH] IN BLOOD BY AUTOMATED COUNT: 11.6 % (ref 11.6–15.1)
FLUAV AG UPPER RESP QL IA.RAPID: NEGATIVE
FLUBV AG UPPER RESP QL IA.RAPID: NEGATIVE
GFR SERPL CREATININE-BSD FRML MDRD: 67 ML/MIN/1.73SQ M
GLUCOSE SERPL-MCNC: 110 MG/DL (ref 65–140)
GLUCOSE UR STRIP-MCNC: NEGATIVE MG/DL
HCT VFR BLD AUTO: 38.4 % (ref 34.8–46.1)
HGB BLD-MCNC: 12.9 G/DL (ref 11.5–15.4)
HGB UR QL STRIP.AUTO: NEGATIVE
IMM GRANULOCYTES # BLD AUTO: 0.03 THOUSAND/UL (ref 0–0.2)
IMM GRANULOCYTES NFR BLD AUTO: 1 % (ref 0–2)
KETONES UR STRIP-MCNC: NEGATIVE MG/DL
LEUKOCYTE ESTERASE UR QL STRIP: NEGATIVE
LIPASE SERPL-CCNC: 33 U/L (ref 11–82)
LYMPHOCYTES # BLD AUTO: 1.63 THOUSANDS/ΜL (ref 0.6–4.47)
LYMPHOCYTES NFR BLD AUTO: 25 % (ref 14–44)
MCH RBC QN AUTO: 32.5 PG (ref 26.8–34.3)
MCHC RBC AUTO-ENTMCNC: 33.6 G/DL (ref 31.4–37.4)
MCV RBC AUTO: 97 FL (ref 82–98)
MONOCYTES # BLD AUTO: 0.4 THOUSAND/ΜL (ref 0.17–1.22)
MONOCYTES NFR BLD AUTO: 6 % (ref 4–12)
NEUTROPHILS # BLD AUTO: 4.36 THOUSANDS/ΜL (ref 1.85–7.62)
NEUTS SEG NFR BLD AUTO: 67 % (ref 43–75)
NITRITE UR QL STRIP: NEGATIVE
NRBC BLD AUTO-RTO: 0 /100 WBCS
P AXIS: 56 DEGREES
PH UR STRIP.AUTO: 7.5 [PH]
PLATELET # BLD AUTO: 171 THOUSANDS/UL (ref 149–390)
PMV BLD AUTO: 8.9 FL (ref 8.9–12.7)
POTASSIUM SERPL-SCNC: 4.3 MMOL/L (ref 3.5–5.3)
PR INTERVAL: 162 MS
PROT SERPL-MCNC: 7.6 G/DL (ref 6.4–8.4)
PROT UR STRIP-MCNC: NEGATIVE MG/DL
QRS AXIS: 52 DEGREES
QRSD INTERVAL: 120 MS
QT INTERVAL: 436 MS
QTC INTERVAL: 493 MS
RBC # BLD AUTO: 3.97 MILLION/UL (ref 3.81–5.12)
SARS-COV+SARS-COV-2 AG RESP QL IA.RAPID: NEGATIVE
SODIUM SERPL-SCNC: 138 MMOL/L (ref 135–147)
SP GR UR STRIP.AUTO: <1.005 (ref 1–1.03)
T WAVE AXIS: 60 DEGREES
UROBILINOGEN UR STRIP-ACNC: <2 MG/DL
VENTRICULAR RATE: 77 BPM
WBC # BLD AUTO: 6.47 THOUSAND/UL (ref 4.31–10.16)

## 2024-09-27 PROCEDURE — 99284 EMERGENCY DEPT VISIT MOD MDM: CPT

## 2024-09-27 PROCEDURE — 74177 CT ABD & PELVIS W/CONTRAST: CPT

## 2024-09-27 PROCEDURE — 87811 SARS-COV-2 COVID19 W/OPTIC: CPT | Performed by: PHYSICIAN ASSISTANT

## 2024-09-27 PROCEDURE — 99285 EMERGENCY DEPT VISIT HI MDM: CPT | Performed by: PHYSICIAN ASSISTANT

## 2024-09-27 PROCEDURE — 87804 INFLUENZA ASSAY W/OPTIC: CPT | Performed by: PHYSICIAN ASSISTANT

## 2024-09-27 PROCEDURE — 83690 ASSAY OF LIPASE: CPT | Performed by: EMERGENCY MEDICINE

## 2024-09-27 PROCEDURE — 85025 COMPLETE CBC W/AUTO DIFF WBC: CPT | Performed by: EMERGENCY MEDICINE

## 2024-09-27 PROCEDURE — 93005 ELECTROCARDIOGRAM TRACING: CPT

## 2024-09-27 PROCEDURE — 93010 ELECTROCARDIOGRAM REPORT: CPT | Performed by: INTERNAL MEDICINE

## 2024-09-27 PROCEDURE — 81003 URINALYSIS AUTO W/O SCOPE: CPT | Performed by: EMERGENCY MEDICINE

## 2024-09-27 PROCEDURE — 36415 COLL VENOUS BLD VENIPUNCTURE: CPT

## 2024-09-27 PROCEDURE — 71045 X-RAY EXAM CHEST 1 VIEW: CPT

## 2024-09-27 PROCEDURE — 80053 COMPREHEN METABOLIC PANEL: CPT | Performed by: EMERGENCY MEDICINE

## 2024-09-27 RX ORDER — LORAZEPAM 0.5 MG/1
0.5 TABLET ORAL ONCE
Status: COMPLETED | OUTPATIENT
Start: 2024-09-27 | End: 2024-09-27

## 2024-09-27 RX ORDER — CIPROFLOXACIN 500 MG/1
500 TABLET, FILM COATED ORAL 2 TIMES DAILY
Qty: 14 TABLET | Refills: 0 | Status: SHIPPED | OUTPATIENT
Start: 2024-09-27 | End: 2024-10-04

## 2024-09-27 RX ORDER — ACETAMINOPHEN 325 MG/1
650 TABLET ORAL ONCE
Status: COMPLETED | OUTPATIENT
Start: 2024-09-27 | End: 2024-09-27

## 2024-09-27 RX ORDER — METRONIDAZOLE 500 MG/1
500 TABLET ORAL EVERY 8 HOURS SCHEDULED
Qty: 21 TABLET | Refills: 0 | Status: SHIPPED | OUTPATIENT
Start: 2024-09-27 | End: 2024-10-04

## 2024-09-27 RX ORDER — METRONIDAZOLE 500 MG/1
500 TABLET ORAL ONCE
Status: COMPLETED | OUTPATIENT
Start: 2024-09-27 | End: 2024-09-27

## 2024-09-27 RX ORDER — CIPROFLOXACIN 500 MG/1
500 TABLET, FILM COATED ORAL ONCE
Status: COMPLETED | OUTPATIENT
Start: 2024-09-27 | End: 2024-09-27

## 2024-09-27 RX ADMIN — LORAZEPAM 0.5 MG: 0.5 TABLET ORAL at 15:14

## 2024-09-27 RX ADMIN — ACETAMINOPHEN 650 MG: 325 TABLET ORAL at 16:00

## 2024-09-27 RX ADMIN — CIPROFLOXACIN 500 MG: 500 TABLET ORAL at 16:00

## 2024-09-27 RX ADMIN — METRONIDAZOLE 500 MG: 500 TABLET ORAL at 16:00

## 2024-09-27 RX ADMIN — IOHEXOL 90 ML: 350 INJECTION, SOLUTION INTRAVENOUS at 14:49

## 2024-09-27 NOTE — ED PROVIDER NOTES
Final diagnoses:   Diverticulitis - duodenum   Hiatal hernia   Liver cyst     ED Disposition       ED Disposition   Discharge    Condition   Stable    Date/Time   Fri Sep 27, 2024  3:40 PM    Comment   Ollie Tellez discharge to home/self care.                   Assessment & Plan       Medical Decision Making  Amount and/or Complexity of Data Reviewed  Labs: ordered.  Radiology: ordered and independent interpretation performed.    Risk  OTC drugs.  Prescription drug management.    Patient with cough, URI symptoms will order covid/flu/rsv, CXR to r/o pneumonia.  Patient with abdominal pain, right flank pain, will order CT scan to r/o acute pyelonephritis, kidney stone, UTI, colitis.   Patient with duodenal diverticulitis on CT scan, will start cipro/flagyl given patient's allergy to ampicillin.    Patient instructed to f/u with PCP for recheck.  Return precautions given.   Patient with liver cyst on CT scan, patient was already aware of this, advised f/u with PCP for further monitoring.          Medications   iohexol (OMNIPAQUE) 350 MG/ML injection (MULTI-DOSE) 100 mL (90 mL Intravenous Given 9/27/24 1449)   LORazepam (ATIVAN) tablet 0.5 mg (0.5 mg Oral Given 9/27/24 1514)   ciprofloxacin (CIPRO) tablet 500 mg (500 mg Oral Given 9/27/24 1600)   metroNIDAZOLE (FLAGYL) tablet 500 mg (500 mg Oral Given 9/27/24 1600)   acetaminophen (TYLENOL) tablet 650 mg (650 mg Oral Given 9/27/24 1600)       ED Risk Strat Scores                           SBIRT 20yo+      Flowsheet Row Most Recent Value   Initial Alcohol Screen: US AUDIT-C     1. How often do you have a drink containing alcohol? 0 Filed at: 09/27/2024 4823   2. How many drinks containing alcohol do you have on a typical day you are drinking?  0 Filed at: 09/27/2024 9592   3a. Male UNDER 65: How often do you have five or more drinks on one occasion? 0 Filed at: 09/27/2024 8446   3b. FEMALE Any Age, or MALE 65+: How often do you have 4 or more drinks on one occassion? 0  Filed at: 09/27/2024 6285   Audit-C Score 0 Filed at: 09/27/2024 2601   DOLORES: How many times in the past year have you...    Used an illegal drug or used a prescription medication for non-medical reasons? Never Filed at: 09/27/2024 5249                            History of Present Illness       Chief Complaint   Patient presents with    Flank Pain     Pt reports cold symptoms for 6 days. Neck pain. Has right flank pain and has been having trouble having a bowel movement. Worried about a uti       Past Medical History:   Diagnosis Date    Anxiety     GERD (gastroesophageal reflux disease)     Hyperlipidemia     Hypertension     Osteoarthritis       Past Surgical History:   Procedure Laterality Date    CATARACT EXTRACTION        Family History   Problem Relation Age of Onset    Heart failure Mother     Heart disease Mother     Hypertension Father     Hypertension Brother       Social History     Tobacco Use    Smoking status: Former    Smokeless tobacco: Never   Vaping Use    Vaping status: Never Used   Substance Use Topics    Alcohol use: No    Drug use: No      E-Cigarette/Vaping    E-Cigarette Use Never User       E-Cigarette/Vaping Substances    Nicotine No     THC No     CBD No     Flavoring No     Other No     Unknown No       I have reviewed and agree with the history as documented.       Flank Pain  Associated symptoms: cough and nausea    Associated symptoms: no chills, no diarrhea, no dysuria, no fever, no shortness of breath and no vomiting      Patient is an 89 y/o F with h/o anxiety, HTN that presents to the ED with with Right sided abdomina pain that radiates to right flank for 6 days.  She states she started 6 days ago with runny nose and cough, a couple days later she developed right sided abdominal pain.  She states her family members are sick with similar URI symptoms, but they don't have abdominal pain.  She has nausea, no vomiting or diarrhea.  She denies dysuria, but has urinary frequency.       Review of Systems   Constitutional:  Negative for chills and fever.   HENT:  Positive for congestion and rhinorrhea.    Respiratory:  Positive for cough. Negative for shortness of breath.    Gastrointestinal:  Positive for abdominal pain and nausea. Negative for blood in stool, diarrhea and vomiting.   Genitourinary:  Positive for flank pain and frequency. Negative for dysuria and urgency.   Musculoskeletal:  Negative for neck pain.   Skin:  Negative for color change and rash.   Neurological:  Negative for dizziness, weakness, numbness and headaches.   Psychiatric/Behavioral:  Negative for confusion.    All other systems reviewed and are negative.          Objective       ED Triage Vitals   Temperature Pulse Blood Pressure Respirations SpO2 Patient Position - Orthostatic VS   09/27/24 1356 09/27/24 1356 09/27/24 1357 09/27/24 1356 09/27/24 1356 09/27/24 1357   98.3 °F (36.8 °C) 96 (!) 178/93 18 95 % Sitting      Temp Source Heart Rate Source BP Location FiO2 (%) Pain Score    09/27/24 1356 09/27/24 1356 09/27/24 1357 -- --    Oral Monitor Right arm        Vitals      Date and Time Temp Pulse SpO2 Resp BP Pain Score FACES Pain Rating User   09/27/24 1500 -- 71 93 % 22 156/77 -- -- AP   09/27/24 1357 -- -- -- -- 178/93 -- -- HR   09/27/24 1356 98.3 °F (36.8 °C) 96 95 % 18 -- -- -- HR            Physical Exam  Vitals and nursing note reviewed.   Constitutional:       General: She is not in acute distress.     Appearance: Normal appearance. She is well-developed, well-groomed and normal weight. She is not ill-appearing.   HENT:      Head: Normocephalic and atraumatic.      Ears:      Comments: Patient [place]     Nose: Nose normal.      Mouth/Throat:      Mouth: Mucous membranes are moist.      Pharynx: Oropharynx is clear.   Eyes:      Conjunctiva/sclera: Conjunctivae normal.   Cardiovascular:      Rate and Rhythm: Normal rate and regular rhythm.      Heart sounds: Normal heart sounds.   Pulmonary:      Effort:  Pulmonary effort is normal.      Breath sounds: Normal breath sounds.   Abdominal:      General: Abdomen is flat. Bowel sounds are normal.      Palpations: Abdomen is soft.      Tenderness: There is abdominal tenderness in the right upper quadrant, right lower quadrant and suprapubic area. There is no guarding or rebound.   Musculoskeletal:      Cervical back: Normal range of motion and neck supple.      Right lower leg: Pitting Edema (trace) present.      Left lower leg: Pitting Edema (trace) present.   Skin:     General: Skin is warm and dry.      Coloration: Skin is not pale.      Findings: No erythema or rash.   Neurological:      Mental Status: She is alert and oriented to person, place, and time.      Sensory: Sensation is intact.      Motor: Motor function is intact.   Psychiatric:         Mood and Affect: Mood normal.         Behavior: Behavior is cooperative.         Results Reviewed       Procedure Component Value Units Date/Time    FLU/COVID Rapid Antigen (30 min. TAT) - Preferred screening test in ED [[id number]]  (Normal) Collected: 09/27/24 1437    Lab Status: Final result Specimen: Nares from Nose Updated: 09/27/24 1504     SARS COV Rapid Antigen Negative     Influenza A Rapid Antigen Negative     Influenza B Rapid Antigen Negative    Narrative:      This test has been performed using the [employer] Opal 2 FLU+SARS Antigen test under the Emergency Use Authorization (EUA). This test has been validated by the  and verified by the performing laboratory. The Opal uses lateral flow immunofluorescent sandwich assay to detect SARS-COV, Influenza A and Influenza B Antigen.     The Quidel Opal 2 SARS Antigen test does not differentiate between SARS-CoV and SARS-CoV-2.     Negative results are presumptive and may be confirmed with a molecular assay, if necessary, for patient management. Negative results do not rule out SARS-CoV-2 or influenza infection and should not be used as the sole basis for  treatment or patient management decisions. A negative test result may occur if the level of antigen in a sample is below the limit of detection of this test.     Positive results are indicative of the presence of viral antigens, but do not rule out bacterial infection or co-infection with other viruses.     All test results should be used as an adjunct to clinical observations and other information available to the provider.    FOR PEDIATRIC PATIENTS - copy/paste COVID Guidelines URL to browser: [email or web address]    UA w Reflex to Microscopic w Reflex to Culture [[id number]]  (Abnormal) Collected: 09/27/24 1428    Lab Status: Final result Specimen: Urine, Clean Catch Updated: 09/27/24 1436     Color, UA Colorless     Clarity, UA Clear     Specific Gravity, UA <1.005     pH, UA 7.5     Leukocytes, UA Negative     Nitrite, UA Negative     Protein, UA Negative mg/dl      Glucose, UA Negative mg/dl      Ketones, UA Negative mg/dl      Urobilinogen, UA <2.0 mg/dl      Bilirubin, UA Negative     Occult Blood, UA Negative    Comprehensive metabolic panel [[id number]] Collected: 09/27/24 1403    Lab Status: Final result Specimen: Blood from Arm, Left Updated: 09/27/24 1430     Sodium 138 mmol/L      Potassium 4.3 mmol/L      Chloride 101 mmol/L      CO2 27 mmol/L      ANION GAP 10 mmol/L      BUN 7 mg/dL      Creatinine 0.78 mg/dL      Glucose 110 mg/dL      Calcium 9.7 mg/dL      AST 22 U/L      ALT 10 U/L      Alkaline Phosphatase 46 U/L      Total Protein 7.6 g/dL      Albumin 4.5 g/dL      Total Bilirubin 0.81 mg/dL      eGFR 67 ml/min/1.73sq m     Narrative:      National Kidney Disease Foundation guidelines for Chronic Kidney Disease (CKD):     Stage 1 with normal or high GFR (GFR > 90 mL/min/1.73 square meters)    Stage 2 Mild CKD (GFR = 60-89 mL/min/1.73 square meters)    Stage 3A Moderate CKD (GFR = 45-59 mL/min/1.73 square meters)    Stage 3B Moderate CKD (GFR  = 30-44 mL/min/1.73 square meters)    Stage 4 Severe CKD (GFR = 15-29 mL/min/1.73 square meters)    Stage 5 End Stage CKD (GFR <15 mL/min/1.73 square meters)  Note: GFR calculation is accurate only with a steady state creatinine    Lipase [[id number]]  (Normal) Collected: 09/27/24 1403    Lab Status: Final result Specimen: Blood from Arm, Left Updated: 09/27/24 1430     Lipase 33 u/L     CBC and differential [[id number]] Collected: 09/27/24 1403    Lab Status: Final result Specimen: Blood from Arm, Left Updated: 09/27/24 1412     WBC 6.47 Thousand/uL      RBC 3.97 Million/uL      Hemoglobin 12.9 g/dL      Hematocrit 38.4 %      MCV 97 fL      MCH 32.5 pg      MCHC 33.6 g/dL      RDW 11.6 %      MPV 8.9 fL      Platelets 171 Thousands/uL      nRBC 0 /100 WBCs      Segmented % 67 %      Immature Grans % 1 %      Lymphocytes % 25 %      Monocytes % 6 %      Eosinophils Relative 1 %      Basophils Relative 0 %      Absolute Neutrophils 4.36 Thousands/µL      Absolute Immature Grans 0.03 Thousand/uL      Absolute Lymphocytes 1.63 Thousands/µL      Absolute Monocytes 0.40 Thousand/µL      Eosinophils Absolute 0.04 Thousand/µL      Basophils Absolute 0.01 Thousands/µL             CT abdomen pelvis with contrast   Final Interpretation by Bro Pelletier MD (09/27 1532)      1.  Large duodenal diverticulum shows mild eccentric wall thickening medially which could suggest inflammation. Duodenal diverticulitis could, theoretically, refer to the right flank. Consider endoscopy.      2.  No other acute findings to explain right flank pain.      Note: The study was marked in EPIC for immediate notification. Endoscopic follow-up recommendation notification was scheduled in the electronic medical record.      Workstation performed: [id number]         XR chest 1 view portable   ED Interpretation by Lashon Dolan PA-C (09/27 1542)   No acute abnormalities.       Final Interpretation by Herbert Balderas MD (09/27 1636)       No acute cardiopulmonary disease.            Workstation performed: [id number]             Procedures    ED Medication and Procedure Management   Prior to Admission Medications   Prescriptions Last Dose Informant Patient Reported? Taking?   CALCIUM CARBONATE-VITAMIN D PO  Self Yes No   Sig: Take 1 tablet by mouth daily   LORazepam (ATIVAN) 0.5 mg tablet   No No   Sig: Take 1 tablet (0.5 mg total) by mouth every 8 (eight) hours as needed for anxiety   NIFEdipine (PROCARDIA XL) 30 mg 24 hr tablet  Self No No   Sig: Take 1 tablet (30 mg total) by mouth daily   NIFEdipine ER (ADALAT CC) 30 MG 24 hr tablet  Self No No   Sig: Take 1 tablet (30 mg total) by mouth daily   aspirin 81 mg chewable tablet  Self Yes No   Sig: Chew Twice daily   atorvastatin (LIPITOR) 10 mg tablet  Self No No   Sig: TAKE 1 TABLET DAILY   citalopram (CeleXA) 40 mg tablet  Self No No   Sig: TAKE 1 TABLET DAILY   furosemide (LASIX) 20 mg tablet   No No   Sig: Take 1 tablet (20 mg total) by mouth daily Takes the medication daily as needed for lower extremity edema   gabapentin (Neurontin) 100 mg capsule  Self No No   Sig: Take 1 capsule (100 mg total) by mouth 3 (three) times a day   Patient taking differently: Take 100 mg by mouth 3 (three) times a day As needed   gabapentin (Neurontin) 100 mg capsule   No No   Sig: Take 1 capsule (100 mg total) by mouth 3 (three) times a day   losartan (Cozaar) 100 MG tablet  Self No No   Sig: Take 1 tablet (100 mg total) by mouth daily   potassium chloride (K-DUR,KLOR-CON) 10 mEq tablet  Self No No   Sig: TAKE 1 TABLET DAILY      Facility-Administered Medications Last Administration Doses Remaining   denosumab (PROLIA) subcutaneous injection 60 mg 6/28/2022  1:26 PM         Discharge Medication List as of 9/27/2024  3:59 PM        START taking these medications    Details   ciprofloxacin (CIPRO) 500 mg tablet Take 1 tablet (500 mg total) by mouth 2 (two) times a day for 7 days, Starting Fri 9/27/2024, Until Fri  10/4/2024, Normal      metroNIDAZOLE (FLAGYL) 500 mg tablet Take 1 tablet (500 mg total) by mouth every 8 (eight) hours for 7 days, Starting Fri 9/27/2024, Until Fri 10/4/2024, Normal           CONTINUE these medications which have NOT CHANGED    Details   aspirin 81 mg chewable tablet Chew Twice daily, Historical Med      atorvastatin (LIPITOR) 10 mg tablet TAKE 1 TABLET DAILY, Starting Thu 2/22/2024, Normal      CALCIUM CARBONATE-VITAMIN D PO Take 1 tablet by mouth daily, Starting Thu 9/28/2017, Historical Med      citalopram (CeleXA) 40 mg tablet TAKE 1 TABLET DAILY, Normal      furosemide (LASIX) 20 mg tablet Take 1 tablet (20 mg total) by mouth daily Takes the medication daily as needed for lower extremity edema, Starting Fri 7/26/2024, Normal      !! gabapentin (Neurontin) 100 mg capsule Take 1 capsule (100 mg total) by mouth 3 (three) times a day, Starting Wed 11/15/2023, Normal      !! gabapentin (Neurontin) 100 mg capsule Take 1 capsule (100 mg total) by mouth 3 (three) times a day, Starting Wed 6/12/2024, Normal      LORazepam (ATIVAN) 0.5 mg tablet Take 1 tablet (0.5 mg total) by mouth every 8 (eight) hours as needed for anxiety, Starting Thu 9/19/2024, Normal      losartan (Cozaar) 100 MG tablet Take 1 tablet (100 mg total) by mouth daily, Starting Mon 11/13/2023, Normal      NIFEdipine (PROCARDIA XL) 30 mg 24 hr tablet Take 1 tablet (30 mg total) by mouth daily, Starting Mon 11/6/2023, Until Wed 12/6/2023, Normal      NIFEdipine ER (ADALAT CC) 30 MG 24 hr tablet Take 1 tablet (30 mg total) by mouth daily, Starting Mon 11/13/2023, Until Thu 11/7/2024, Normal      potassium chloride (K-DUR,KLOR-CON) 10 mEq tablet TAKE 1 TABLET DAILY, Normal       !! - Potential duplicate medications found. Please discuss with provider.        No discharge procedures on file.  ED SEPSIS DOCUMENTATION   Time reflects when diagnosis was documented in both MDM as applicable and the Disposition within this note       Time User  Action Codes Description Comment    9/27/2024  3:39 PM Lashon Dolan Add [K57.92] Diverticulitis     9/27/2024  3:39 PM Lashon Dolan Modify [K57.92] Diverticulitis duodenum    9/27/2024  3:40 PM Lashon Dolan Add [K44.9] Hiatal hernia     9/27/2024  3:40 PM Lashon Dolan Add [K76.89] Liver cyst                  Lashon Dolan PA-C  09/27/24 1909

## 2024-09-27 NOTE — DISCHARGE INSTRUCTIONS
Rest, bland diet for 2 days, then slowly advance foods.  Tylenol for discomfort.  Take cipro and flagyl as directed.  Follow up with PCP in 3-5 days for recheck.  Return to ER if vomiting, fevers, pain worsens.

## 2024-10-03 ENCOUNTER — TELEPHONE (OUTPATIENT)
Age: 89
End: 2024-10-03

## 2024-10-03 NOTE — TELEPHONE ENCOUNTER
Pt called and stated she had gone to [facility] last week and was diagnosed with diverticulitis and wanted to make sure Dr. Potts had received the summary and all the updated information like medications and more. Pt is currently taking the ciprofloxacin and metronidazole until Saturday and she wants to make sure Dr. Potts thinks that's okay to continue; she also has an upcoming appt on 10/16 but if she needs to be seen earlier, then Dr. Potts can give her a call and let her know or if need to discuss any additional information. Please advise.

## 2024-10-04 ENCOUNTER — OFFICE VISIT (OUTPATIENT)
Age: 89
End: 2024-10-04
Payer: MEDICARE

## 2024-10-04 VITALS
DIASTOLIC BLOOD PRESSURE: 72 MMHG | RESPIRATION RATE: 18 BRPM | SYSTOLIC BLOOD PRESSURE: 140 MMHG | WEIGHT: 165 LBS | BODY MASS INDEX: 26.52 KG/M2 | TEMPERATURE: 97.6 F | HEART RATE: 82 BPM | HEIGHT: 66 IN | OXYGEN SATURATION: 97 %

## 2024-10-04 DIAGNOSIS — R60.0 LOWER EXTREMITY EDEMA: ICD-10-CM

## 2024-10-04 DIAGNOSIS — K57.12 DIVERTICULITIS OF DUODENUM: ICD-10-CM

## 2024-10-04 DIAGNOSIS — I10 PRIMARY HYPERTENSION: ICD-10-CM

## 2024-10-04 DIAGNOSIS — F41.9 ANXIETY: ICD-10-CM

## 2024-10-04 DIAGNOSIS — K57.32 DIVERTICULITIS OF LARGE INTESTINE WITHOUT PERFORATION OR ABSCESS WITHOUT BLEEDING: Primary | ICD-10-CM

## 2024-10-04 DIAGNOSIS — J40 BRONCHITIS: ICD-10-CM

## 2024-10-04 PROCEDURE — 99214 OFFICE O/P EST MOD 30 MIN: CPT | Performed by: INTERNAL MEDICINE

## 2024-10-04 PROCEDURE — G2211 COMPLEX E/M VISIT ADD ON: HCPCS | Performed by: INTERNAL MEDICINE

## 2024-10-04 NOTE — ASSESSMENT & PLAN NOTE
Longstanding history of hypertension.  Blood pressure is under adequate control with present treatment.  Patient will continue present medication and surveillance.  Patient is complaining of some lightheadedness but this does not seem to be related to her blood pressure which again is showing adequate control.

## 2024-10-04 NOTE — ASSESSMENT & PLAN NOTE
Patient does have a history of chronic edema bilateral lower extremities and was placed on Lasix previously to help with this.  She states with her recent illness she has not been taking Lasix and she has significant swelling to both lower extremities.  On evaluation patient appears to be hydrated and blood pressure is controlled.  Suggest that she takes the Lasix intermittently but not necessarily on a daily basis to help her with her edema and to lessen edema to lower extremities during her illness

## 2024-10-04 NOTE — PROGRESS NOTES
Ambulatory Visit  Name: Ollie Tellez      : 1935      MRN: [id number]  Encounter Provider: Eusebio Potts DO  Encounter Date: 10/4/2024   Encounter department: [facility]    Assessment & Plan  Diverticulitis of large intestine without perforation or abscess without bleeding         Diverticulitis of duodenum  Patient seen recently evaluation in the emergency room for upper respiratory tract symptoms abdominal pain right flank pain.  Episodes of nausea but no vomiting.  Patient on evaluation underwent CT scan of the abdomen showing a diverticulosis, diverticulitis to the duodenum.  Decision at that point in time is to place the patient on antibiotic treatment and started on a combination of metronidazole and Cipro.  Patient states that she was having some abdominal pain and discomfort prior to the emergency room evaluation and difficulty with her bowel function including straining to move her bowels.  At this time she states her bowels are loose that she is sticking to a very light diet but admits that she is not getting adequate nutrition.  Patient today is awake alert afebrile.  Patient has diffuse tenderness to the abdomen throughout all 4 quadrants no rebound or rigidity no CVA tenderness to percussion.  We told the patient that she should continue with her soft diet and complete antibiotics as prescribed.  In order to get adequate nutrition we suggest supplementing with Ensure or boost shakes which she has used in the past.  To continue to keep us informed of her condition and in the emergency room they felt it may be imperative patient have an endoscopy and further studies through gastroenterologist and if not improving we will try to help arrange this for the patient who is living outside of [employer]         Lower extremity edema  Patient does have a history of chronic edema bilateral lower extremities and was placed on Lasix previously to help with this.  She states  with her recent illness she has not been taking Lasix and she has significant swelling to both lower extremities.  On evaluation patient appears to be hydrated and blood pressure is controlled.  Suggest that she takes the Lasix intermittently but not necessarily on a daily basis to help her with her edema and to lessen edema to lower extremities during her illness         Primary hypertension  Longstanding history of hypertension.  Blood pressure is under adequate control with present treatment.  Patient will continue present medication and surveillance.  Patient is complaining of some lightheadedness but this does not seem to be related to her blood pressure which again is showing adequate control.         Bronchitis  Patient was seen in the emergency room also complaining of upper respiratory tract symptoms nasal congestion and slight cough postnasal drip.  Patient on evaluation today is afebrile lungs are clear.  Some mild congestion nasal mucosa.  O2 sat at 97% which is stable for this patient.  No treatment was indicated at this time other than to continue with her Flonase nasal spray         Anxiety  Patient is having continued difficulties with anxiety especially with this acute medical issue and problem.  Patient was reassured that she is getting adequate and appropriate treatment at this time.  To continue to take the Ativan as needed              History of Present Illness     89-year-old female history of medical problems outlined previously who is being seen today for evaluation, reevaluation after workup and treatment during ER visit.  Was seen and evaluated with multiple complaints including nasal congestion postnasal drip and upper respiratory tract symptoms.  Also diffuse abdominal pain mostly right upper quadrant and right flank pain and discomfort.  We did review hospital records workup treatment and evaluation      Review of Systems   Constitutional:  Positive for activity change, appetite change  and fatigue. Negative for chills, diaphoresis, fever and unexpected weight change.        Relates some decrease in activity level and definitely decrease in her appetite.  Some fatigue.   HENT:  Positive for congestion and postnasal drip. Negative for dental problem, drooling, ear discharge, ear pain, facial swelling, hearing loss, mouth sores, nosebleeds, rhinorrhea, sinus pressure, sinus pain, sneezing, sore throat, tinnitus, trouble swallowing and voice change.    Eyes: Negative.    Respiratory:  Positive for cough. Negative for apnea, choking, chest tightness, shortness of breath, wheezing and stridor.    Cardiovascular:  Positive for leg swelling. Negative for chest pain and palpitations.   Gastrointestinal:  Positive for abdominal pain. Negative for abdominal distention, anal bleeding, blood in stool, constipation, diarrhea, nausea, rectal pain and vomiting.   Endocrine: Negative.    Genitourinary: Negative.    Musculoskeletal: Negative.    Skin: Negative.    Allergic/Immunologic: Negative.    Neurological: Negative.    Hematological: Negative.    Psychiatric/Behavioral:  Negative for agitation, behavioral problems, confusion, decreased concentration, dysphoric mood, hallucinations, self-injury, sleep disturbance and suicidal ideas. The patient is nervous/anxious. The patient is not hyperactive.      Past Medical History:   Diagnosis Date    Anxiety     GERD (gastroesophageal reflux disease)     Hyperlipidemia     Hypertension     Osteoarthritis      Past Surgical History:   Procedure Laterality Date    CATARACT EXTRACTION       Family History   Problem Relation Age of Onset    Heart failure Mother     Heart disease Mother     Hypertension Father     Hypertension Brother      Social History     Tobacco Use    Smoking status: Former    Smokeless tobacco: Never   Vaping Use    Vaping status: Never Used   Substance and Sexual Activity    Alcohol use: No    Drug use: No    Sexual activity: Not on file     Current  Outpatient Medications on File Prior to Visit   Medication Sig    aspirin 81 mg chewable tablet Chew Twice daily    atorvastatin (LIPITOR) 10 mg tablet TAKE 1 TABLET DAILY    CALCIUM CARBONATE-VITAMIN D PO Take 1 tablet by mouth daily    ciprofloxacin (CIPRO) 500 mg tablet Take 1 tablet (500 mg total) by mouth 2 (two) times a day for 7 days    citalopram (CeleXA) 40 mg tablet TAKE 1 TABLET DAILY    furosemide (LASIX) 20 mg tablet Take 1 tablet (20 mg total) by mouth daily Takes the medication daily as needed for lower extremity edema    gabapentin (Neurontin) 100 mg capsule Take 1 capsule (100 mg total) by mouth 3 (three) times a day    LORazepam (ATIVAN) 0.5 mg tablet Take 1 tablet (0.5 mg total) by mouth every 8 (eight) hours as needed for anxiety    losartan (Cozaar) 100 MG tablet Take 1 tablet (100 mg total) by mouth daily    metroNIDAZOLE (FLAGYL) 500 mg tablet Take 1 tablet (500 mg total) by mouth every 8 (eight) hours for 7 days    NIFEdipine ER (ADALAT CC) 30 MG 24 hr tablet Take 1 tablet (30 mg total) by mouth daily    potassium chloride (K-DUR,KLOR-CON) 10 mEq tablet TAKE 1 TABLET DAILY    [DISCONTINUED] gabapentin (Neurontin) 100 mg capsule Take 1 capsule (100 mg total) by mouth 3 (three) times a day (Patient taking differently: Take 100 mg by mouth 3 (three) times a day As needed)    [DISCONTINUED] NIFEdipine (PROCARDIA XL) 30 mg 24 hr tablet Take 1 tablet (30 mg total) by mouth daily     Allergies   Allergen Reactions    Ampicillin      Other reaction(s): Unknown Allergic Reaction  Reaction Date: 01Nov2011;     Sulfa Antibiotics      Other reaction(s): Unknown Allergic Reaction    Sulfamethoxazole-Trimethoprim      Reaction Date: 01Nov2011;      Immunization History   Administered Date(s) Administered    COVID-19 PFIZER VACCINE 0.3 ML IM 02/10/2021, 03/03/2021, 11/29/2021    COVID-19 Pfizer vac (Kang-sucrose, gray cap) 12 yr+ IM 06/08/2022    DTaP 5 05/14/2014    INFLUENZA 11/08/2015    Influenza  "Quadrivalent, 6-35 Months IM 09/28/2017    Influenza Split High Dose Preservative Free IM 09/23/2013, 09/11/2014, 09/08/2016    Influenza, high dose seasonal 0.7 mL 10/01/2018, 10/24/2019, 10/22/2020, 11/09/2021, 10/09/2023    Influenza, seasonal, injectable 10/01/2010    Pneumococcal Conjugate 13-Valent 12/03/2014    Pneumococcal Polysaccharide PPV23 02/04/2019     Objective     /72   Pulse 82   Temp 97.6 °F (36.4 °C)   Resp 18   Ht 5' 6\" (1.676 m)   Wt 74.8 kg (165 lb)   LMP  (LMP Unknown)   SpO2 97%   BMI 26.63 kg/m²     Physical Exam  Vitals and nursing note reviewed.   Constitutional:       General: She is not in acute distress.     Appearance: Normal appearance. She is not ill-appearing, toxic-appearing or diaphoretic.      Comments: Anxious articulate, overweight 89-year-old female who is awake alert.  Not ill-appearing   HENT:      Head: Normocephalic and atraumatic.      Right Ear: Tympanic membrane, ear canal and external ear normal. There is no impacted cerumen.      Left Ear: Tympanic membrane, ear canal and external ear normal. There is no impacted cerumen.      Nose: Rhinorrhea present.      Comments: Slight erythematous nasal mucosa with clear rhinorrhea     Mouth/Throat:      Mouth: Mucous membranes are moist.      Pharynx: Oropharyngeal exudate and posterior oropharyngeal erythema present.      Comments: Mildly erythematous posterior airway with a whitish postnasal drip  Eyes:      General: No scleral icterus.        Right eye: No discharge.         Left eye: No discharge.      Extraocular Movements: Extraocular movements intact.      Conjunctiva/sclera: Conjunctivae normal.      Pupils: Pupils are equal, round, and reactive to light.   Neck:      Vascular: No carotid bruit.   Cardiovascular:      Rate and Rhythm: Normal rate and regular rhythm.      Heart sounds: Normal heart sounds. No murmur heard.     No friction rub. No gallop.   Pulmonary:      Effort: Pulmonary effort is normal. " No respiratory distress.      Breath sounds: Normal breath sounds. No stridor. No wheezing, rhonchi or rales.   Chest:      Chest wall: No tenderness.   Abdominal:      General: Abdomen is flat. There is no distension.      Palpations: There is no mass.      Tenderness: There is abdominal tenderness. There is no right CVA tenderness, left CVA tenderness, guarding or rebound.      Hernia: No hernia is present.      Comments: Diffuse tenderness throughout all 4 quadrants to palpation.  No rebound no masses no rigidity.  Patient has increased bowel sounds x 4 quadrants   Musculoskeletal:      Cervical back: Normal range of motion and neck supple. No rigidity or tenderness.      Right lower leg: Edema present.      Left lower leg: Edema present.   Lymphadenopathy:      Cervical: No cervical adenopathy.   Skin:     General: Skin is warm and dry.      Coloration: Skin is not jaundiced or pale.      Findings: No bruising, erythema, lesion or rash.   Neurological:      Mental Status: She is alert and oriented to person, place, and time. Mental status is at baseline.   Psychiatric:         Behavior: Behavior normal.         Thought Content: Thought content normal.         Judgment: Judgment normal.      Comments: Moderately anxious mood and affect

## 2024-10-04 NOTE — ASSESSMENT & PLAN NOTE
Patient is having continued difficulties with anxiety especially with this acute medical issue and problem.  Patient was reassured that she is getting adequate and appropriate treatment at this time.  To continue to take the Ativan as needed

## 2024-10-04 NOTE — ASSESSMENT & PLAN NOTE
Patient was seen in the emergency room also complaining of upper respiratory tract symptoms nasal congestion and slight cough postnasal drip.  Patient on evaluation today is afebrile lungs are clear.  Some mild congestion nasal mucosa.  O2 sat at 97% which is stable for this patient.  No treatment was indicated at this time other than to continue with her Flonase nasal spray

## 2024-10-04 NOTE — ASSESSMENT & PLAN NOTE
Patient seen recently evaluation in the emergency room for upper respiratory tract symptoms abdominal pain right flank pain.  Episodes of nausea but no vomiting.  Patient on evaluation underwent CT scan of the abdomen showing a diverticulosis, diverticulitis to the duodenum.  Decision at that point in time is to place the patient on antibiotic treatment and started on a combination of metronidazole and Cipro.  Patient states that she was having some abdominal pain and discomfort prior to the emergency room evaluation and difficulty with her bowel function including straining to move her bowels.  At this time she states her bowels are loose that she is sticking to a very light diet but admits that she is not getting adequate nutrition.  Patient today is awake alert afebrile.  Patient has diffuse tenderness to the abdomen throughout all 4 quadrants no rebound or rigidity no CVA tenderness to percussion.  We told the patient that she should continue with her soft diet and complete antibiotics as prescribed.  In order to get adequate nutrition we suggest supplementing with Ensure or boost shakes which she has used in the past.  To continue to keep us informed of her condition and in the emergency room they felt it may be imperative patient have an endoscopy and further studies through gastroenterologist and if not improving we will try to help arrange this for the patient who is living outside of [place]

## 2024-10-04 NOTE — TELEPHONE ENCOUNTER
Pt states she received a call yesterday from Dr Potts but didn't get to phone on time. She would like to speak to him this morning if possible please. Thank you

## 2024-10-04 NOTE — TELEPHONE ENCOUNTER
Patient called back stating that she received another call from the  Tried reaching out to office. Patient is leaving for her 11:20 appointment for today.    Thank you!!

## 2024-10-15 DIAGNOSIS — I10 PRIMARY HYPERTENSION: ICD-10-CM

## 2024-10-15 RX ORDER — LOSARTAN POTASSIUM 100 MG/1
100 TABLET ORAL DAILY
Qty: 90 TABLET | Refills: 1 | Status: SHIPPED | OUTPATIENT
Start: 2024-10-15

## 2024-10-15 RX ORDER — NIFEDIPINE 30 MG
30 TABLET, EXTENDED RELEASE ORAL DAILY
Qty: 90 TABLET | Refills: 1 | Status: SHIPPED | OUTPATIENT
Start: 2024-10-15

## 2024-10-29 ENCOUNTER — OFFICE VISIT (OUTPATIENT)
Age: 89
End: 2024-10-29
Payer: MEDICARE

## 2024-10-29 VITALS
DIASTOLIC BLOOD PRESSURE: 88 MMHG | TEMPERATURE: 98.7 F | HEART RATE: 79 BPM | SYSTOLIC BLOOD PRESSURE: 152 MMHG | BODY MASS INDEX: 25.84 KG/M2 | WEIGHT: 160.8 LBS | HEIGHT: 66 IN | OXYGEN SATURATION: 96 %

## 2024-10-29 DIAGNOSIS — M81.0 AGE-RELATED OSTEOPOROSIS WITHOUT CURRENT PATHOLOGICAL FRACTURE: ICD-10-CM

## 2024-10-29 DIAGNOSIS — F41.9 ANXIETY: ICD-10-CM

## 2024-10-29 DIAGNOSIS — K57.12 DIVERTICULITIS OF DUODENUM: Primary | ICD-10-CM

## 2024-10-29 DIAGNOSIS — I10 PRIMARY HYPERTENSION: ICD-10-CM

## 2024-10-29 DIAGNOSIS — J40 BRONCHITIS: ICD-10-CM

## 2024-10-29 PROCEDURE — G2211 COMPLEX E/M VISIT ADD ON: HCPCS | Performed by: INTERNAL MEDICINE

## 2024-10-29 PROCEDURE — 99214 OFFICE O/P EST MOD 30 MIN: CPT | Performed by: INTERNAL MEDICINE

## 2024-10-29 RX ORDER — LORAZEPAM 0.5 MG/1
0.5 TABLET ORAL EVERY 8 HOURS PRN
Qty: 90 TABLET | Refills: 0 | Status: SHIPPED | OUTPATIENT
Start: 2024-10-29

## 2024-10-29 NOTE — ASSESSMENT & PLAN NOTE
Blood pressure was slightly elevated with presentation in the office today.  Once the patient was allowed to sit and relax it did come down to an acceptable range.  Patient will continue present medication surveillance and we will continue also to monitor renal function

## 2024-10-29 NOTE — ASSESSMENT & PLAN NOTE
Orders:    LORazepam (ATIVAN) 0.5 mg tablet; Take 1 tablet (0.5 mg total) by mouth every 8 (eight) hours as needed for anxiety     7/27/2019    Chief Complaint   Patient presents with   • Annual Exam           HPI:    Pt here for annual exam and to discuss vaccines. He will be moving to [place] with his wife in the next few weeks. For his job, he needs proof of immunity to MMR and varicella, has vaccine records, but requesting titers. Also needing PPD testing for Tb. Denies positive Tb testing in the past, no fevers, chills, cough, night sweats, weight loss. He reports overall feels well, healthy diet and regular exercise.   HPI    Review of Systems   Constitutional: Negative for chills, fatigue, fever and unexpected weight change.   HENT: Negative for congestion, ear discharge, ear pain, sinus pain and sore throat.    Eyes: Negative for visual disturbance.   Respiratory: Negative for chest tightness and shortness of breath.    Cardiovascular: Negative for chest pain and palpitations.   Gastrointestinal: Negative for abdominal pain, constipation, diarrhea, nausea and vomiting.   Endocrine: Negative for cold intolerance, heat intolerance, polydipsia and polyuria.   Genitourinary: Negative for difficulty urinating, dysuria and frequency.   Musculoskeletal: Negative for arthralgias and myalgias.   Skin: Negative for rash.   Neurological: Negative for dizziness, weakness, numbness and headaches.   Hematological: Negative for adenopathy. Does not bruise/bleed easily.   Psychiatric/Behavioral: Negative.        I reviewed pertinent past medical, surgical, family history, imaging and labs as indicated.      Relevant Past Medical History:  No past medical history on file.    Social History     Socioeconomic History   • Marital status: Single     Spouse name: Not on file   • Number of children: Not on file   • Years of education: Not on file   • Highest education level: Not on file   Occupational History   • Not on file   Social Needs   • Financial resource strain: Not on file   • Food insecurity:     Worry: Not on file     Inability: Not on file   •  Transportation needs:     Medical: Not on file     Non-medical: Not on file   Tobacco Use   • Smoking status: Never Smoker   • Smokeless tobacco: Never Used   Substance and Sexual Activity   • Alcohol use: Yes     Comment: socially   • Drug use: Not on file   • Sexual activity: Not on file   Lifestyle   • Physical activity:     Days per week: Not on file     Minutes per session: Not on file   • Stress: Not on file   Relationships   • Social connections:     Talks on phone: Not on file     Gets together: Not on file     Attends Anabaptist service: Not on file     Active member of club or organization: Not on file     Attends meetings of clubs or organizations: Not on file     Relationship status: Not on file   • Intimate partner violence:     Fear of current or ex partner: Not on file     Emotionally abused: Not on file     Physically abused: Not on file     Forced sexual activity: Not on file   Other Topics Concern   • Not on file   Social History Narrative   • Not on file       No past surgical history on file.    ALLERGIES:  No Known Allergies     No family history on file.    Current Medications:   Current Medications    No medications on file       Examination:   Blood pressure 128/73, pulse 73, temperature 97.5 °F (36.4 °C), temperature source Tympanic, resp. rate 16, height 5' 9\" (1.753 m), weight 77.2 kg (170 lb 3.1 oz).  Weight    07/27/19 0802   Weight: 77.2 kg (170 lb 3.1 oz)       Physical Exam   Constitutional: He is oriented to person, place, and time. He appears well-developed and well-nourished.   HENT:   Head: Normocephalic and atraumatic.   Right Ear: External ear normal.   Left Ear: External ear normal.   Nose: Nose normal.   Mouth/Throat: Oropharynx is clear and moist.   Eyes: Pupils are equal, round, and reactive to light. Conjunctivae and EOM are normal.   Neck: Normal range of motion. Neck supple. No thyroid mass and no thyromegaly present.   Cardiovascular: Normal rate, regular rhythm and  normal heart sounds.   No murmur heard.  Pulmonary/Chest: Effort normal and breath sounds normal. No respiratory distress.   Abdominal: Soft. Bowel sounds are normal. There is no tenderness.   Musculoskeletal: Normal range of motion.   Neurological: He is alert and oriented to person, place, and time. He has normal reflexes.   Skin: Skin is warm and dry. No rash noted.   Psychiatric: He has a normal mood and affect. His behavior is normal. Judgment and thought content normal.   Nursing note and vitals reviewed.        Assessment/Plan:  Problem List Items Addressed This Visit        Other    Health maintenance examination     Screening labs as ordered  PPD placed today for TB screening, follow up in office in 48-72 hours for reading  Recommend flu shot in the fall         Relevant Orders    COMPREHENSIVE METABOLIC PANEL    CBC & AUTO DIFFERENTIAL    LIPID PANEL WITH REFLEX    TB INTRADERMAL TEST      Other Visit Diagnoses     Immunity status testing    -  Primary    Relevant Orders    MUMPS IMMUNE AB IGG    RUBELLA ANTIBODY IGG    RUBEOLA IMMUNITY IGG    VZV IMMUNITY IGG            No follow-ups on file.        Meghan M Closser, CNP

## 2024-10-29 NOTE — PROGRESS NOTES
Ambulatory Visit  Name: Ollie Tellez      : 1935      MRN: [id number]  Encounter Provider: Eusebio Potts DO  Encounter Date: 10/29/2024   Encounter department: [facility] INTERNAL MEDICINE    Assessment & Plan  Anxiety    Orders:    LORazepam (ATIVAN) 0.5 mg tablet; Take 1 tablet (0.5 mg total) by mouth every 8 (eight) hours as needed for anxiety    Diverticulitis of duodenum  Patient was recently treated for diverticulitis.  Patient has completed antibiotic treatment and states that her appetite is back to normal.  Denies any difficulties with her bowel function.  No black stools or blood in the stools and very rare abdominal discomfort with cough in the right lower quadrant.  Patient's abdomen is soft nontender with positive bowel sounds active 4 quadrants no rebound rigidity or masses.  Patient was reinforced as far as maintaining proper bowel function making sure she is taking fiber on a daily basis to keep her bowels soft.         Primary hypertension  Blood pressure was slightly elevated with presentation in the office today.  Once the patient was allowed to sit and relax it did come down to an acceptable range.  Patient will continue present medication surveillance and we will continue also to monitor renal function         Age-related osteoporosis without current pathological fracture  Continues on Prolia injections every 6 months         Bronchitis  Patient is presenting today with upper respiratory symptoms.  States that over the past 2 days that she has had some nasal congestion postnasal drip and a cough.  Occasionally bringing up a whitish sometimes slightly yellow mucus.  No fever no chills no shortness of breath.  Patient on evaluation does have some slight erythema to posterior airway with a thick whitish postnasal drip.  Patient has faint rhonchi heard anterior chest which did clear with cough.  Diagnosis of bronchitis and probable viral upper respiratory tract infection.  Was  told she needs to restart her Flonase nasal spray and can continue with the Robitussin DM to help break up the mucus.  To call if any increase mucus production fever chills shortness of breath.           Depression Screening and Follow-up Plan: Patient was screened for depression during today's encounter. They screened negative with a PHQ-2 score of 1.        History of Present Illness     Patient is an 89-year-old female history of multiple medical problems outlined previously who is here today for follow-up after 4-week period of time.  Patient recently seen in the emergency room and then in our office for diverticulitis.  Was treated appropriately with antibiotics and states he is now recovered.  Appetite is gone back to normal.  Patient relates that she has been suffering from an upper respiratory tract infection for the past 2 days with nasal congestion cough and chest congestion      Review of Systems   Constitutional:  Positive for activity change. Negative for appetite change, chills, diaphoresis, fatigue, fever and unexpected weight change.        Has had some restriction with her activity level trying to get back to normal.   HENT:  Positive for congestion and postnasal drip. Negative for dental problem, drooling, ear discharge, ear pain, facial swelling, hearing loss, mouth sores, nosebleeds, rhinorrhea, sinus pressure, sinus pain, sneezing, sore throat, tinnitus, trouble swallowing and voice change.    Eyes: Negative.    Respiratory:  Positive for cough. Negative for apnea, choking, chest tightness, shortness of breath, wheezing and stridor.    Cardiovascular: Negative.    Gastrointestinal: Negative.    Endocrine: Negative.    Genitourinary: Negative.    Musculoskeletal: Negative.    Skin: Negative.    Allergic/Immunologic: Negative.    Neurological: Negative.    Hematological: Negative.    Psychiatric/Behavioral: Negative.       Past Medical History:   Diagnosis Date    Anxiety     GERD  (gastroesophageal reflux disease)     Hyperlipidemia     Hypertension     Osteoarthritis      Past Surgical History:   Procedure Laterality Date    CATARACT EXTRACTION       Family History   Problem Relation Age of Onset    Heart failure Mother     Heart disease Mother     Hypertension Father     Hypertension Brother      Social History     Tobacco Use    Smoking status: Former    Smokeless tobacco: Never   Vaping Use    Vaping status: Never Used   Substance and Sexual Activity    Alcohol use: No    Drug use: No    Sexual activity: Not on file     Current Outpatient Medications on File Prior to Visit   Medication Sig    aspirin 81 mg chewable tablet Chew Twice daily    atorvastatin (LIPITOR) 10 mg tablet TAKE 1 TABLET DAILY    CALCIUM CARBONATE-VITAMIN D PO Take 1 tablet by mouth daily    citalopram (CeleXA) 40 mg tablet TAKE 1 TABLET DAILY    furosemide (LASIX) 20 mg tablet Take 1 tablet (20 mg total) by mouth daily Takes the medication daily as needed for lower extremity edema    gabapentin (Neurontin) 100 mg capsule Take 1 capsule (100 mg total) by mouth 3 (three) times a day    losartan (COZAAR) 100 MG tablet TAKE 1 TABLET DAILY    NIFEdipine ER (ADALAT CC) 30 MG 24 hr tablet TAKE 1 TABLET DAILY    potassium chloride (K-DUR,KLOR-CON) 10 mEq tablet TAKE 1 TABLET DAILY    [DISCONTINUED] LORazepam (ATIVAN) 0.5 mg tablet Take 1 tablet (0.5 mg total) by mouth every 8 (eight) hours as needed for anxiety     Allergies   Allergen Reactions    Ampicillin      Other reaction(s): Unknown Allergic Reaction  Reaction Date: 01Nov2011;     Sulfa Antibiotics      Other reaction(s): Unknown Allergic Reaction    Sulfamethoxazole-Trimethoprim      Reaction Date: 01Nov2011;      Immunization History   Administered Date(s) Administered    COVID-19 PFIZER VACCINE 0.3 ML IM 02/10/2021, 03/03/2021, 11/29/2021    COVID-19 Pfizer vac (Kang-sucrose, gray cap) 12 yr+ IM 06/08/2022    DTaP 5 05/14/2014    INFLUENZA 11/08/2015    Influenza  "Quadrivalent, 6-35 Months IM 09/28/2017    Influenza Split High Dose Preservative Free IM 09/23/2013, 09/11/2014, 09/08/2016    Influenza, high dose seasonal 0.7 mL 10/01/2018, 10/24/2019, 10/22/2020, 11/09/2021, 10/09/2023    Influenza, seasonal, injectable 10/01/2010    Pneumococcal Conjugate 13-Valent 12/03/2014    Pneumococcal Polysaccharide PPV23 02/04/2019     Objective     /88   Pulse 79   Temp 98.7 °F (37.1 °C)   Ht 5' 6\" (1.676 m)   Wt 72.9 kg (160 lb 12.8 oz)   LMP  (LMP Unknown)   SpO2 96%   BMI 25.95 kg/m²     Physical Exam  Vitals and nursing note reviewed.   Constitutional:       General: She is not in acute distress.     Appearance: Normal appearance. She is not ill-appearing, toxic-appearing or diaphoretic.      Comments: Pleasant, mildly anxious 89-year-old female who is awake alert congested sounding   HENT:      Head: Normocephalic and atraumatic.      Right Ear: Tympanic membrane, ear canal and external ear normal. There is no impacted cerumen.      Left Ear: Tympanic membrane, ear canal and external ear normal. There is no impacted cerumen.      Nose: Congestion and rhinorrhea present.      Comments: Some generalized erythematous nasal mucosa with a clear nasal discharge     Mouth/Throat:      Mouth: Mucous membranes are moist.      Pharynx: Oropharyngeal exudate and posterior oropharyngeal erythema present.      Comments: Moderate erythema to posterior airway with a thick whitish to yellow postnasal drip  Eyes:      General: No scleral icterus.        Right eye: No discharge.         Left eye: No discharge.      Extraocular Movements: Extraocular movements intact.      Conjunctiva/sclera: Conjunctivae normal.      Pupils: Pupils are equal, round, and reactive to light.   Neck:      Vascular: No carotid bruit.   Cardiovascular:      Rate and Rhythm: Normal rate and regular rhythm.      Heart sounds: Normal heart sounds.      No friction rub. No gallop.   Pulmonary:      Effort: " Pulmonary effort is normal. No respiratory distress.      Breath sounds: No stridor. Rhonchi present. No wheezing or rales.      Comments: Slightly decreased breath sounds anteriorly and posteriorly with faint rhonchi heard anterior chest wall which cleared with cough  Chest:      Chest wall: No tenderness.   Abdominal:      General: Abdomen is flat. Bowel sounds are normal. There is no distension.      Palpations: Abdomen is soft. There is no mass.      Tenderness: There is no abdominal tenderness. There is no right CVA tenderness, left CVA tenderness, guarding or rebound.      Hernia: No hernia is present.   Musculoskeletal:         General: No swelling, tenderness, deformity or signs of injury.      Cervical back: Normal range of motion and neck supple. No rigidity or tenderness.      Right lower leg: Edema present.      Left lower leg: Edema present.      Comments: +1 edema bilateral lower extremities with compression hose in place.   Lymphadenopathy:      Cervical: No cervical adenopathy.   Skin:     General: Skin is warm and dry.      Coloration: Skin is not jaundiced or pale.      Findings: No bruising, erythema, lesion or rash.   Neurological:      General: No focal deficit present.      Mental Status: She is alert and oriented to person, place, and time. Mental status is at baseline.      Cranial Nerves: No cranial nerve deficit.      Sensory: No sensory deficit.      Motor: No weakness.      Coordination: Coordination normal.      Gait: Gait normal.      Deep Tendon Reflexes: Reflexes normal.   Psychiatric:         Mood and Affect: Mood normal.         Behavior: Behavior normal.         Thought Content: Thought content normal.         Judgment: Judgment normal.

## 2024-10-29 NOTE — ASSESSMENT & PLAN NOTE
Patient was recently treated for diverticulitis.  Patient has completed antibiotic treatment and states that her appetite is back to normal.  Denies any difficulties with her bowel function.  No black stools or blood in the stools and very rare abdominal discomfort with cough in the right lower quadrant.  Patient's abdomen is soft nontender with positive bowel sounds active 4 quadrants no rebound rigidity or masses.  Patient was reinforced as far as maintaining proper bowel function making sure she is taking fiber on a daily basis to keep her bowels soft.

## 2024-10-29 NOTE — ASSESSMENT & PLAN NOTE
Patient is presenting today with upper respiratory symptoms.  States that over the past 2 days that she has had some nasal congestion postnasal drip and a cough.  Occasionally bringing up a whitish sometimes slightly yellow mucus.  No fever no chills no shortness of breath.  Patient on evaluation does have some slight erythema to posterior airway with a thick whitish postnasal drip.  Patient has faint rhonchi heard anterior chest which did clear with cough.  Diagnosis of bronchitis and probable viral upper respiratory tract infection.  Was told she needs to restart her Flonase nasal spray and can continue with the Robitussin DM to help break up the mucus.  To call if any increase mucus production fever chills shortness of breath.

## 2024-11-11 ENCOUNTER — APPOINTMENT (EMERGENCY)
Dept: RADIOLOGY | Facility: HOSPITAL | Age: 89
End: 2024-11-11
Payer: MEDICARE

## 2024-11-11 ENCOUNTER — OFFICE VISIT (OUTPATIENT)
Age: 89
End: 2024-11-11
Payer: MEDICARE

## 2024-11-11 ENCOUNTER — HOSPITAL ENCOUNTER (EMERGENCY)
Facility: HOSPITAL | Age: 89
Discharge: HOME | End: 2024-11-11
Attending: EMERGENCY MEDICINE
Payer: MEDICARE

## 2024-11-11 ENCOUNTER — TELEPHONE (OUTPATIENT)
Age: 89
End: 2024-11-11

## 2024-11-11 VITALS
HEART RATE: 100 BPM | HEIGHT: 66 IN | BODY MASS INDEX: 25.39 KG/M2 | OXYGEN SATURATION: 95 % | DIASTOLIC BLOOD PRESSURE: 72 MMHG | TEMPERATURE: 99.8 F | WEIGHT: 158 LBS | SYSTOLIC BLOOD PRESSURE: 138 MMHG

## 2024-11-11 VITALS
HEART RATE: 80 BPM | SYSTOLIC BLOOD PRESSURE: 151 MMHG | OXYGEN SATURATION: 95 % | DIASTOLIC BLOOD PRESSURE: 72 MMHG | TEMPERATURE: 99.4 F | RESPIRATION RATE: 24 BRPM

## 2024-11-11 DIAGNOSIS — J18.9 PNEUMONIA: Primary | ICD-10-CM

## 2024-11-11 DIAGNOSIS — J40 BRONCHITIS: Primary | ICD-10-CM

## 2024-11-11 DIAGNOSIS — I10 PRIMARY HYPERTENSION: ICD-10-CM

## 2024-11-11 LAB
2HR DELTA HS TROPONIN: 1 NG/L
ANION GAP SERPL CALCULATED.3IONS-SCNC: 8 MMOL/L (ref 4–13)
BASOPHILS # BLD AUTO: 0.03 THOUSANDS/ÂΜL (ref 0–0.1)
BASOPHILS NFR BLD AUTO: 0 % (ref 0–1)
BUN SERPL-MCNC: 12 MG/DL (ref 5–25)
CALCIUM SERPL-MCNC: 9 MG/DL (ref 8.4–10.2)
CARDIAC TROPONIN I PNL SERPL HS: 7 NG/L
CARDIAC TROPONIN I PNL SERPL HS: 8 NG/L
CHLORIDE SERPL-SCNC: 97 MMOL/L (ref 96–108)
CO2 SERPL-SCNC: 28 MMOL/L (ref 21–32)
CREAT SERPL-MCNC: 0.7 MG/DL (ref 0.6–1.3)
EOSINOPHIL # BLD AUTO: 0.08 THOUSAND/ÂΜL (ref 0–0.61)
EOSINOPHIL NFR BLD AUTO: 1 % (ref 0–6)
ERYTHROCYTE [DISTWIDTH] IN BLOOD BY AUTOMATED COUNT: 11.9 % (ref 11.6–15.1)
FLUAV AG UPPER RESP QL IA.RAPID: NEGATIVE
FLUBV AG UPPER RESP QL IA.RAPID: NEGATIVE
GFR SERPL CREATININE-BSD FRML MDRD: 77 ML/MIN/1.73SQ M
GLUCOSE SERPL-MCNC: 142 MG/DL (ref 65–140)
HCT VFR BLD AUTO: 34.6 % (ref 34.8–46.1)
HGB BLD-MCNC: 11.3 G/DL (ref 11.5–15.4)
IMM GRANULOCYTES # BLD AUTO: 0.04 THOUSAND/UL (ref 0–0.2)
IMM GRANULOCYTES NFR BLD AUTO: 0 % (ref 0–2)
LYMPHOCYTES # BLD AUTO: 1.59 THOUSANDS/ÂΜL (ref 0.6–4.47)
LYMPHOCYTES NFR BLD AUTO: 15 % (ref 14–44)
MCH RBC QN AUTO: 32.4 PG (ref 26.8–34.3)
MCHC RBC AUTO-ENTMCNC: 32.7 G/DL (ref 31.4–37.4)
MCV RBC AUTO: 99 FL (ref 82–98)
MONOCYTES # BLD AUTO: 1.06 THOUSAND/ÂΜL (ref 0.17–1.22)
MONOCYTES NFR BLD AUTO: 10 % (ref 4–12)
NEUTROPHILS # BLD AUTO: 8.06 THOUSANDS/ÂΜL (ref 1.85–7.62)
NEUTS SEG NFR BLD AUTO: 74 % (ref 43–75)
NRBC BLD AUTO-RTO: 0 /100 WBCS
PLATELET # BLD AUTO: 236 THOUSANDS/UL (ref 149–390)
PMV BLD AUTO: 8.9 FL (ref 8.9–12.7)
POTASSIUM SERPL-SCNC: 3.7 MMOL/L (ref 3.5–5.3)
RBC # BLD AUTO: 3.49 MILLION/UL (ref 3.81–5.12)
SARS-COV+SARS-COV-2 AG RESP QL IA.RAPID: NEGATIVE
SODIUM SERPL-SCNC: 133 MMOL/L (ref 135–147)
WBC # BLD AUTO: 10.86 THOUSAND/UL (ref 4.31–10.16)

## 2024-11-11 PROCEDURE — 99214 OFFICE O/P EST MOD 30 MIN: CPT | Performed by: INTERNAL MEDICINE

## 2024-11-11 PROCEDURE — 99284 EMERGENCY DEPT VISIT MOD MDM: CPT | Performed by: EMERGENCY MEDICINE

## 2024-11-11 PROCEDURE — G2211 COMPLEX E/M VISIT ADD ON: HCPCS | Performed by: INTERNAL MEDICINE

## 2024-11-11 PROCEDURE — 87811 SARS-COV-2 COVID19 W/OPTIC: CPT

## 2024-11-11 PROCEDURE — 36415 COLL VENOUS BLD VENIPUNCTURE: CPT

## 2024-11-11 PROCEDURE — 84484 ASSAY OF TROPONIN QUANT: CPT

## 2024-11-11 PROCEDURE — 87804 INFLUENZA ASSAY W/OPTIC: CPT

## 2024-11-11 PROCEDURE — 80048 BASIC METABOLIC PNL TOTAL CA: CPT

## 2024-11-11 PROCEDURE — 99285 EMERGENCY DEPT VISIT HI MDM: CPT

## 2024-11-11 PROCEDURE — 85025 COMPLETE CBC W/AUTO DIFF WBC: CPT

## 2024-11-11 PROCEDURE — 71046 X-RAY EXAM CHEST 2 VIEWS: CPT

## 2024-11-11 RX ORDER — DOXYCYCLINE 100 MG/1
100 CAPSULE ORAL 2 TIMES DAILY
Qty: 14 CAPSULE | Refills: 0 | Status: SHIPPED | OUTPATIENT
Start: 2024-11-11 | End: 2024-11-18

## 2024-11-11 RX ORDER — ALBUTEROL SULFATE 90 UG/1
2 INHALANT RESPIRATORY (INHALATION) EVERY 6 HOURS PRN
Qty: 18 G | Refills: 5 | Status: SHIPPED | OUTPATIENT
Start: 2024-11-11 | End: 2024-11-11

## 2024-11-11 RX ORDER — DOXYCYCLINE 100 MG/1
100 CAPSULE ORAL ONCE
Status: COMPLETED | OUTPATIENT
Start: 2024-11-11 | End: 2024-11-11

## 2024-11-11 RX ORDER — DOXYCYCLINE 100 MG/1
100 CAPSULE ORAL EVERY 12 HOURS SCHEDULED
Qty: 20 CAPSULE | Refills: 0 | Status: SHIPPED | OUTPATIENT
Start: 2024-11-11 | End: 2024-11-11

## 2024-11-11 RX ORDER — DOXYCYCLINE 100 MG/1
100 CAPSULE ORAL 2 TIMES DAILY
Qty: 14 CAPSULE | Refills: 0 | Status: SHIPPED | OUTPATIENT
Start: 2024-11-11 | End: 2024-11-11 | Stop reason: CLARIF

## 2024-11-11 RX ADMIN — DOXYCYCLINE 100 MG: 100 CAPSULE ORAL at 19:40

## 2024-11-11 NOTE — PROGRESS NOTES
Ambulatory Visit  Name: Ollie Tellez      : 1935      MRN: [id number]  Encounter Provider: Eusebio Potts DO  Encounter Date: 2024   Encounter department: [facility] INTERNAL MEDICINE    Assessment & Plan  Bronchitis  Patient is being seen today for acute evaluation.  Since she was last seen in the office has continued with a cough and chest congestion.  She has been taking over-the-counter medications such as Mucinex and using her nasal inhaler.  She states she is coughing up greenish to yellowish mucus also likewise from her nares.  She is extremely short of breath and has a very thick heavy cough.  We attempted to give the patient an updraft treatment, mini nebulizer with albuterol and patient was unable to tolerate this with a persistent thick cough.  Lungs sounds show rhonchi bilaterally which did not decrease with cough.  We are worried about a possible pneumonia and patient needs to be seen in the emergency room and have further workup and evaluation.  Patient is going by ambulance for her safety    Orders:    Transfer to other facility    Primary hypertension  Patient does have a longstanding history of hypertension.  She is taking her medication as prescribed and blood pressure is stable today.              History of Present Illness     Patient is an 89-year-old female history of multiple medical problems outlined previously who is being seen today acutely.  Was seen in the office approximately 2 weeks ago after treatment for a diverticulitis.  Patient's GI symptoms had resolved but patient did present with bronchitic type symptoms but no fever or chills and patient was taking over-the-counter medication since that time but has gotten worse with increasing cough chest congestion and nasal congestion and production of a thick greenish-yellowish material from the nares from from the lungs.  Patient is complaining of difficulties with her cough and shortness of breath.      Review  of Systems   Constitutional:  Positive for activity change, appetite change and fatigue. Negative for chills, diaphoresis, fever and unexpected weight change.        Some decrease in activity level secondary to upper respiratory tract symptoms.  Decreased appetite and fatigue   HENT:  Positive for congestion, postnasal drip, rhinorrhea and sinus pressure. Negative for dental problem, drooling, ear discharge, ear pain, facial swelling, hearing loss, mouth sores, nosebleeds, sinus pain, sneezing, sore throat, tinnitus, trouble swallowing and voice change.    Eyes: Negative.    Respiratory:  Positive for cough, choking and shortness of breath. Negative for apnea, chest tightness, wheezing and stridor.    Cardiovascular: Negative.    Gastrointestinal: Negative.    Endocrine: Negative.    Genitourinary: Negative.    Musculoskeletal: Negative.    Skin: Negative.    Allergic/Immunologic: Negative.    Neurological: Negative.    Hematological: Negative.    Psychiatric/Behavioral: Negative.       Past Medical History:   Diagnosis Date    Anxiety     GERD (gastroesophageal reflux disease)     Hyperlipidemia     Hypertension     Osteoarthritis      Past Surgical History:   Procedure Laterality Date    CATARACT EXTRACTION       Family History   Problem Relation Age of Onset    Heart failure Mother     Heart disease Mother     Hypertension Father     Hypertension Brother      Social History     Tobacco Use    Smoking status: Former    Smokeless tobacco: Never   Vaping Use    Vaping status: Never Used   Substance and Sexual Activity    Alcohol use: No    Drug use: No    Sexual activity: Not on file     Current Outpatient Medications on File Prior to Visit   Medication Sig    aspirin 81 mg chewable tablet Chew Twice daily    atorvastatin (LIPITOR) 10 mg tablet TAKE 1 TABLET DAILY    CALCIUM CARBONATE-VITAMIN D PO Take 1 tablet by mouth daily    citalopram (CeleXA) 40 mg tablet TAKE 1 TABLET DAILY    furosemide (LASIX) 20 mg tablet  "Take 1 tablet (20 mg total) by mouth daily Takes the medication daily as needed for lower extremity edema    gabapentin (Neurontin) 100 mg capsule Take 1 capsule (100 mg total) by mouth 3 (three) times a day    LORazepam (ATIVAN) 0.5 mg tablet Take 1 tablet (0.5 mg total) by mouth every 8 (eight) hours as needed for anxiety    losartan (COZAAR) 100 MG tablet TAKE 1 TABLET DAILY    NIFEdipine ER (ADALAT CC) 30 MG 24 hr tablet TAKE 1 TABLET DAILY    potassium chloride (K-DUR,KLOR-CON) 10 mEq tablet TAKE 1 TABLET DAILY     Allergies   Allergen Reactions    Ampicillin      Other reaction(s): Unknown Allergic Reaction  Reaction Date: 01Nov2011;     Sulfa Antibiotics      Other reaction(s): Unknown Allergic Reaction    Sulfamethoxazole-Trimethoprim      Reaction Date: 01Nov2011;      Immunization History   Administered Date(s) Administered    COVID-19 PFIZER VACCINE 0.3 ML IM 02/10/2021, 03/03/2021, 11/29/2021    COVID-19 Pfizer vac (Kang-sucrose, gray cap) 12 yr+ IM 06/08/2022    DTaP 5 05/14/2014    INFLUENZA 11/08/2015    Influenza Quadrivalent, 6-35 Months IM 09/28/2017    Influenza Split High Dose Preservative Free IM 09/23/2013, 09/11/2014, 09/08/2016    Influenza, high dose seasonal 0.7 mL 10/01/2018, 10/24/2019, 10/22/2020, 11/09/2021, 10/09/2023    Influenza, seasonal, injectable 10/01/2010    Pneumococcal Conjugate 13-Valent 12/03/2014    Pneumococcal Polysaccharide PPV23 02/04/2019     Objective     /72   Pulse 100   Temp 99.8 °F (37.7 °C)   Ht 5' 6\" (1.676 m)   Wt 71.7 kg (158 lb)   LMP  (LMP Unknown)   SpO2 95%   BMI 25.50 kg/m²     Physical Exam  Vitals and nursing note reviewed.   Constitutional:       General: She is in acute distress.      Appearance: She is ill-appearing. She is not toxic-appearing or diaphoretic.      Comments: Moderately ill-appearing 89-year-old female who is awake alert severe congestion with a cough with respiratory maneuvers.   HENT:      Head: Normocephalic and " atraumatic.      Right Ear: Tympanic membrane, ear canal and external ear normal. There is no impacted cerumen.      Left Ear: Tympanic membrane, ear canal and external ear normal. There is impacted cerumen.      Nose: Congestion and rhinorrhea present.      Comments: Diffuse erythema to nasal mucosa with yellow discharge,     Mouth/Throat:      Mouth: Mucous membranes are moist.      Pharynx: Oropharyngeal exudate and posterior oropharyngeal erythema present.      Comments: Severe erythema to posterior airway with a thick yellow postnasal drip  Eyes:      General: No scleral icterus.        Right eye: No discharge.         Left eye: No discharge.      Extraocular Movements: Extraocular movements intact.      Conjunctiva/sclera: Conjunctivae normal.      Pupils: Pupils are equal, round, and reactive to light.   Neck:      Vascular: No carotid bruit.   Cardiovascular:      Rate and Rhythm: Regular rhythm. Tachycardia present.      Heart sounds: Normal heart sounds. No murmur heard.     No friction rub. No gallop.   Pulmonary:      Effort: Respiratory distress present.      Breath sounds: No stridor. Rhonchi present. No wheezing or rales.      Comments: Patient has tachypnea with a respiratory rate of 24, decreased breath sounds anteriorly and posteriorly with rhonchi heard throughout both lungs which sent cleared slightly with cough.  No wheezes  Abdominal:      General: Abdomen is flat. Bowel sounds are normal.      Palpations: Abdomen is soft.   Musculoskeletal:         General: Normal range of motion.      Cervical back: Normal range of motion and neck supple. No rigidity or tenderness.   Lymphadenopathy:      Cervical: No cervical adenopathy.   Skin:     General: Skin is warm.      Capillary Refill: Capillary refill takes less than 2 seconds.   Neurological:      General: No focal deficit present.      Mental Status: She is alert and oriented to person, place, and time. Mental status is at baseline.      Cranial  Nerves: No cranial nerve deficit.      Motor: No weakness.   Psychiatric:         Behavior: Behavior normal.         Thought Content: Thought content normal.         Judgment: Judgment normal.      Comments: Mildly anxious mood and affect

## 2024-11-11 NOTE — ASSESSMENT & PLAN NOTE
Patient is being seen today for acute evaluation.  Since she was last seen in the office has continued with a cough and chest congestion.  She has been taking over-the-counter medications such as Mucinex and using her nasal inhaler.  She states she is coughing up greenish to yellowish mucus also likewise from her nares.  She is extremely short of breath and has a very thick heavy cough.  We attempted to give the patient an updraft treatment, mini nebulizer with albuterol and patient was unable to tolerate this with a persistent thick cough.  Lungs sounds show rhonchi bilaterally which did not decrease with cough.  We are worried about a possible pneumonia and patient needs to be seen in the emergency room and have further workup and evaluation.  Patient is going by ambulance for her safety    Orders:    Transfer to other facility

## 2024-11-11 NOTE — ED PROVIDER NOTES
Chief Complaint   Patient presents with    Shortness of Breath     Sent from PCP. SOB and productive cough w/ yellow sputum x 11 days. OTC meds provided no relief. PCP sent pt in for r/o pneumonia     History of Present Illness and Review of Systems   This is a 89 y.o. female with past medical history for hypertension hyperlipidemia and recent upper respiratory infection for the past 11 days comes in after being in her outpatient primary care office with continued symptoms and worsening productive sputum.  Patient has had intermittent fevers.  She has pleurisy but no increased shortness of breath.  Patient is not on any new medication.  She denies any abdominal pain diarrhea or constipation.    No other complaints for this encounter.    Remainder of ROS Reviewed and Non-Pertinent    Past Medical, Past Surgical History:    has a past medical history of Anxiety, GERD (gastroesophageal reflux disease), Hyperlipidemia, Hypertension, and Osteoarthritis.   has a past surgical history that includes Cataract extraction.     Allergies:     Allergies   Allergen Reactions    Ampicillin      Other reaction(s): Unknown Allergic Reaction  Reaction Date: 01Nov2011;     Sulfa Antibiotics      Other reaction(s): Unknown Allergic Reaction    Sulfamethoxazole-Trimethoprim      Reaction Date: 01Nov2011;        Social and Family History:     Social History     Substance and Sexual Activity   Alcohol Use No     Social History     Tobacco Use   Smoking Status Former   Smokeless Tobacco Never     Social History     Substance and Sexual Activity   Drug Use No       Physical Examination     Vitals:    11/11/24 1610 11/11/24 1845   BP: 146/70 151/72   BP Location: Right arm    Pulse: 97 80   Resp: (!) 24 (!) 24   Temp: 99.4 °F (37.4 °C)    TempSrc: Tympanic    SpO2: 94% 95%       Physical Exam  Vitals and nursing note reviewed.   Constitutional:       General: She is not in acute distress.     Appearance: She is well-developed. She is not  ill-appearing or diaphoretic.   HENT:      Head: Normocephalic and atraumatic.   Eyes:      Extraocular Movements: Extraocular movements intact.      Conjunctiva/sclera: Conjunctivae normal.      Pupils: Pupils are equal, round, and reactive to light.   Cardiovascular:      Rate and Rhythm: Normal rate and regular rhythm.      Heart sounds: No murmur heard.  Pulmonary:      Effort: Pulmonary effort is normal. No tachypnea, bradypnea, accessory muscle usage or respiratory distress.      Breath sounds: Examination of the right-lower field reveals decreased breath sounds and rales. Examination of the left-lower field reveals decreased breath sounds and rales. Decreased breath sounds and rales present. No wheezing.   Abdominal:      Palpations: Abdomen is soft.      Tenderness: There is no abdominal tenderness.   Musculoskeletal:         General: No swelling.      Cervical back: Neck supple.   Skin:     General: Skin is warm and dry.      Capillary Refill: Capillary refill takes less than 2 seconds.   Neurological:      Mental Status: She is alert.   Psychiatric:         Mood and Affect: Mood normal.           Procedures   Procedures      MDM:   Medical Decision Making  Amount and/or Complexity of Data Reviewed  Labs: ordered.  Radiology: ordered.    89-year-old female comes in for evaluation of continued upper respiratory infection with cough and productive sputum  Patient has pleurisy on inspiration  She is not requiring supportive oxygen  Vitals are stable  Differential diagnoses include upper respiratory infection, bronchitis, superimposed pneumonia.  Doubt ACS doubt PE  Plan to get CBC CMP troponin EKG chest x-ray and flu COVID testing  Patient found to have pneumonia. She was started on doxycycline, given one dose in the ED and recommended to follow up with your pcp        Final Dispo   Final Diagnosis:  1. Pneumonia      Time reflects when diagnosis was documented in both MDM as applicable and the Disposition  within this note       Time User Action Codes Description Comment    11/11/2024  7:06 PM Ld, Prince Add [J18.9] Pneumonia           ED Disposition       ED Disposition   Discharge    Condition   Stable    Date/Time   Mon Nov 11, 2024  7:17 PM    Comment   Ollie Tellez discharge to home/self care.                   Follow-up Information    None       Medications   doxycycline hyclate (VIBRAMYCIN) capsule 100 mg (100 mg Oral Given 11/11/24 1940)       Risk Stratification Tools                Orders Placed This Encounter   Procedures    FLU/COVID Rapid Antigen (30 min. TAT) - Preferred screening test in ED    XR chest 2 views    CBC and differential    Basic metabolic panel    HS Troponin 0hr (reflex protocol)    HS Troponin I 2hr       Labs:     Labs Reviewed   CBC AND DIFFERENTIAL - Abnormal       Result Value Ref Range Status    WBC 10.86 (*) 4.31 - 10.16 Thousand/uL Final    RBC 3.49 (*) 3.81 - 5.12 Million/uL Final    Hemoglobin 11.3 (*) 11.5 - 15.4 g/dL Final    Hematocrit 34.6 (*) 34.8 - 46.1 % Final    MCV 99 (*) 82 - 98 fL Final    MCH 32.4  26.8 - 34.3 pg Final    MCHC 32.7  31.4 - 37.4 g/dL Final    RDW 11.9  11.6 - 15.1 % Final    MPV 8.9  8.9 - 12.7 fL Final    Platelets 236  149 - 390 Thousands/uL Final    nRBC 0  /100 WBCs Final    Segmented % 74  43 - 75 % Final    Immature Grans % 0  0 - 2 % Final    Lymphocytes % 15  14 - 44 % Final    Monocytes % 10  4 - 12 % Final    Eosinophils Relative 1  0 - 6 % Final    Basophils Relative 0  0 - 1 % Final    Absolute Neutrophils 8.06 (*) 1.85 - 7.62 Thousands/µL Final    Absolute Immature Grans 0.04  0.00 - 0.20 Thousand/uL Final    Absolute Lymphocytes 1.59  0.60 - 4.47 Thousands/µL Final    Absolute Monocytes 1.06  0.17 - 1.22 Thousand/µL Final    Eosinophils Absolute 0.08  0.00 - 0.61 Thousand/µL Final    Basophils Absolute 0.03  0.00 - 0.10 Thousands/µL Final   BASIC METABOLIC PANEL - Abnormal    Sodium 133 (*) 135 - 147 mmol/L Final    Potassium 3.7   3.5 - 5.3 mmol/L Final    Chloride 97  96 - 108 mmol/L Final    CO2 28  21 - 32 mmol/L Final    ANION GAP 8  4 - 13 mmol/L Final    BUN 12  5 - 25 mg/dL Final    Creatinine 0.70  0.60 - 1.30 mg/dL Final    Comment: Standardized to IDMS reference method    Glucose 142 (*) 65 - 140 mg/dL Final    Comment: If the patient is fasting, the ADA then defines impaired fasting glucose as > 100 mg/dL and diabetes as > or equal to 123 mg/dL.    Calcium 9.0  8.4 - 10.2 mg/dL Final    eGFR 77  ml/min/1.73sq m Final    Narrative:     National Kidney Disease Foundation guidelines for Chronic Kidney Disease (CKD):     Stage 1 with normal or high GFR (GFR > 90 mL/min/1.73 square meters)    Stage 2 Mild CKD (GFR = 60-89 mL/min/1.73 square meters)    Stage 3A Moderate CKD (GFR = 45-59 mL/min/1.73 square meters)    Stage 3B Moderate CKD (GFR = 30-44 mL/min/1.73 square meters)    Stage 4 Severe CKD (GFR = 15-29 mL/min/1.73 square meters)    Stage 5 End Stage CKD (GFR <15 mL/min/1.73 square meters)  Note: GFR calculation is accurate only with a steady state creatinine   COVID-19/INFLUENZA A/B RAPID ANTIGEN (30 MIN.TAT) - Normal    SARS COV Rapid Antigen Negative  Negative Final    Influenza A Rapid Antigen Negative  Negative Final    Influenza B Rapid Antigen Negative  Negative Final    Narrative:     This test has been performed using the [employer] Opal 2 FLU+SARS Antigen test under the Emergency Use Authorization (EUA). This test has been validated by the  and verified by the performing laboratory. The Opal uses lateral flow immunofluorescent sandwich assay to detect SARS-COV, Influenza A and Influenza B Antigen.     The Quidel Opal 2 SARS Antigen test does not differentiate between SARS-CoV and SARS-CoV-2.     Negative results are presumptive and may be confirmed with a molecular assay, if necessary, for patient management. Negative results do not rule out SARS-CoV-2 or influenza infection and should not be used as the sole  "basis for treatment or patient management decisions. A negative test result may occur if the level of antigen in a sample is below the limit of detection of this test.     Positive results are indicative of the presence of viral antigens, but do not rule out bacterial infection or co-infection with other viruses.     All test results should be used as an adjunct to clinical observations and other information available to the provider.    FOR PEDIATRIC PATIENTS - copy/paste COVID Guidelines URL to browser: [email or web address]   HS TROPONIN I 0HR - Normal    hs TnI 0hr 7  \"Refer to ACS Flowchart\"- see link ng/L Final    Comment:                                              Initial (time 0) result  If >=50 ng/L, Myocardial injury suggested ;  Type of myocardial injury and treatment strategy  to be determined.  If 5-49 ng/L, a delta result at 2 hours and or 4 hours will be needed to further evaluate.  If <4 ng/L, and chest pain has been >3 hours since onset, patient may qualify for discharge based on the HEART score in the ED.  If <5 ng/L and <3hours since onset of chest pain, a delta result at 2 hours will be needed to further evaluate.    HS Troponin 99th Percentile URL of a Health Population=12 ng/L with a 95% Confidence Interval of 8-18 ng/L.    Second Troponin (time 2 hours)  If calculated delta >= 20 ng/L,  Myocardial injury suggested ; Type of myocardial injury and treatment strategy to be determined.  If 5-49 ng/L and the calculated delta is 5-19 ng/L, consult medical service for evaluation.  Continue evaluation for ischemia on ecg and other possible etiology and repeat hs troponin at 4 hours.  If delta is <5 ng/L at 2 hours, consider discharge based on risk stratification via the HEART score (if in ED), or MAYNOR risk score in IP/Observation.    HS Troponin 99th Percentile URL of a Health Population=12 ng/L with a 95% Confidence Interval of 8-18 ng/L.   HS " "TROPONIN I 2HR - Normal    hs TnI 2hr 8  \"Refer to ACS Flowchart\"- see link ng/L Final    Comment:                                              Initial (time 0) result  If >=50 ng/L, Myocardial injury suggested ;  Type of myocardial injury and treatment strategy  to be determined.  If 5-49 ng/L, a delta result at 2 hours and or 4 hours will be needed to further evaluate.  If <4 ng/L, and chest pain has been >3 hours since onset, patient may qualify for discharge based on the HEART score in the ED.  If <5 ng/L and <3hours since onset of chest pain, a delta result at 2 hours will be needed to further evaluate.    HS Troponin 99th Percentile URL of a Health Population=12 ng/L with a 95% Confidence Interval of 8-18 ng/L.    Second Troponin (time 2 hours)  If calculated delta >= 20 ng/L,  Myocardial injury suggested ; Type of myocardial injury and treatment strategy to be determined.  If 5-49 ng/L and the calculated delta is 5-19 ng/L, consult medical service for evaluation.  Continue evaluation for ischemia on ecg and other possible etiology and repeat hs troponin at 4 hours.  If delta is <5 ng/L at 2 hours, consider discharge based on risk stratification via the HEART score (if in ED), or MAYNOR risk score in IP/Observation.    HS Troponin 99th Percentile URL of a Health Population=12 ng/L with a 95% Confidence Interval of 8-18 ng/L.    Delta 2hr hsTnI 1  <20 ng/L Final       Imaging:     XR chest 2 views   Final Result by Herbert Balderas MD (11/11 4944)      Mild reticular opacities at the periphery of the lung bases appear increased from baseline and may be due to infection.            Workstation performed: [id number]            All details of the evaluation and treatment plan were made clear and additionally all questions and concerns were addressed while under my care.    Portions of the record may have been created with voice recognition software. Occasional wrong word or \"sound a like\" substitutions may have " occurred due to the inherent limitations of voice recognition software. Read the chart carefully and recognize, using context, where substitutions have occurred.    The attending physician physically available and evaluated the above patient alongside myself.      Prince Jaffe MD  11/13/24 0045

## 2024-11-11 NOTE — ASSESSMENT & PLAN NOTE
Patient does have a longstanding history of hypertension.  She is taking her medication as prescribed and blood pressure is stable today.

## 2024-11-11 NOTE — TELEPHONE ENCOUNTER
Pt called to make appt with Dr Potts, nothing avail until 11/814.  Pt states she had a bad cough and cold and its not going away. Pt would like to speak to  as soon as possible please. Thank you

## 2024-11-12 NOTE — DISCHARGE INSTRUCTIONS
Please take the prescribed abx twice a day for 7 days. Please follow up with your pcp if you have any new concerns.

## 2024-11-13 NOTE — TELEPHONE ENCOUNTER
I did have a conversation today with the patient.  She is sounding somewhat better less shortness of breath.  She is taking the antibiotics directed and also taking Mucinex DM over-the-counter which helps her bring up the mucus.  She definitely has a pneumonia on the chest x-ray.  Will continue to keep us informed of her condition.  Told to keep well-hydrated and well-nourished

## 2024-11-13 NOTE — TELEPHONE ENCOUNTER
Pt called with update, pt is felling the same. She still has a cough and congestion but is taking medication for it.

## 2024-11-15 NOTE — ED ATTENDING ATTESTATION
11/11/2024  I, Nathanael Gatica DO, saw and evaluated the patient. I have discussed the patient with the resident/non-physician practitioner and agree with the resident's/non-physician practitioner's findings, Plan of Care, and MDM as documented in the resident's/non-physician practitioner's note, except where noted. All available labs and Radiology studies were reviewed.  I was present for key portions of any procedure(s) performed by the resident/non-physician practitioner and I was immediately available to provide assistance.       At this point I agree with the current assessment done in the Emergency Department.  I have conducted an independent evaluation of this patient a history and physical is as follow    89-year-old female.  URI symptoms.  Ongoing for over a week.  Some pleuritic symptoms.  Productive cough.  No other associated symptoms.  Normal exam other than rhonchi on the bases.  No other modifying factors.  Looks overall fairly well plan cxr labs, reassess. Diff includes viral or pneumonia    ED Course         Critical Care Time  Procedures

## 2024-12-11 DIAGNOSIS — I10 ESSENTIAL HYPERTENSION: ICD-10-CM

## 2024-12-11 DIAGNOSIS — F41.9 ANXIETY: ICD-10-CM

## 2024-12-12 RX ORDER — CITALOPRAM HYDROBROMIDE 40 MG/1
40 TABLET ORAL DAILY
Qty: 90 TABLET | Refills: 1 | Status: SHIPPED | OUTPATIENT
Start: 2024-12-12

## 2024-12-12 RX ORDER — POTASSIUM CHLORIDE 750 MG/1
10 TABLET, EXTENDED RELEASE ORAL DAILY
Qty: 90 TABLET | Refills: 1 | Status: SHIPPED | OUTPATIENT
Start: 2024-12-12

## 2025-01-13 DIAGNOSIS — R60.0 LOWER EXTREMITY EDEMA: ICD-10-CM

## 2025-01-13 DIAGNOSIS — F41.9 ANXIETY: ICD-10-CM

## 2025-01-13 NOTE — TELEPHONE ENCOUNTER
Reason for call:   [x] Refill   [] Prior Auth  [] Other:     Office:   [x] PCP/Provider - [facility]  Authorized By: Eusebio Potts DO    [] Specialty/Provider -     Medication: LORazepam (ATIVAN) 0.5 mg tablet    Dose/Frequency: Take 1 tablet (0.5 mg total) by mouth every 8 (eight) hours as needed for anxiety    Quantity: 100 tablet    Pharmacy: [facility] - [address]     Does the patient have enough for 3 days?   [] Yes   [] No - Send as HP to POD    Reason for call:   [x] Refill   [] Prior Auth  [] Other:     Office:   [x] [facility]  Authorized By: Eusebio Potts DO    [] Specialty/Provider -     Medication: furosemide (LASIX) 20 mg tablet    Dose/Frequency: Take 1 tablet (20 mg total) by mouth daily    Quantity: 100 tablet    Pharmacy: [facility] - [address]     Does the patient have enough for 3 days?   [] Yes   [] No - Send as HP to POD

## 2025-01-14 RX ORDER — LORAZEPAM 0.5 MG/1
0.5 TABLET ORAL EVERY 8 HOURS PRN
Qty: 90 TABLET | Refills: 0 | Status: SHIPPED | OUTPATIENT
Start: 2025-01-14

## 2025-01-14 RX ORDER — FUROSEMIDE 20 MG/1
20 TABLET ORAL DAILY
Qty: 90 TABLET | Refills: 1 | Status: SHIPPED | OUTPATIENT
Start: 2025-01-14

## 2025-01-23 NOTE — ASSESSMENT & PLAN NOTE
Patient does have a longstanding history of hypertension  She remains on Hyzaar 100/25 mg daily  Blood pressure is controlled  Renal function is stable  Only abnormality with electrolytes is a slightly  Level  Low sodium level    Patient will continue present medication Never smoker

## 2025-02-17 DIAGNOSIS — E78.00 PURE HYPERCHOLESTEROLEMIA: ICD-10-CM

## 2025-02-17 RX ORDER — ATORVASTATIN CALCIUM 10 MG/1
10 TABLET, FILM COATED ORAL DAILY
Qty: 90 TABLET | Refills: 3 | Status: SHIPPED | OUTPATIENT
Start: 2025-02-17

## 2025-02-27 ENCOUNTER — OFFICE VISIT (OUTPATIENT)
Age: OVER 89
End: 2025-02-27
Payer: MEDICARE

## 2025-02-27 VITALS
BODY MASS INDEX: 26.68 KG/M2 | WEIGHT: 166 LBS | RESPIRATION RATE: 18 BRPM | TEMPERATURE: 99.1 F | HEIGHT: 66 IN | SYSTOLIC BLOOD PRESSURE: 140 MMHG | DIASTOLIC BLOOD PRESSURE: 64 MMHG | HEART RATE: 94 BPM | OXYGEN SATURATION: 95 %

## 2025-02-27 DIAGNOSIS — Z00.00 HEALTHCARE MAINTENANCE: Primary | ICD-10-CM

## 2025-02-27 DIAGNOSIS — E78.01 FAMILIAL HYPERCHOLESTEROLEMIA: ICD-10-CM

## 2025-02-27 DIAGNOSIS — M81.0 AGE-RELATED OSTEOPOROSIS WITHOUT CURRENT PATHOLOGICAL FRACTURE: ICD-10-CM

## 2025-02-27 DIAGNOSIS — R60.0 LOWER EXTREMITY EDEMA: ICD-10-CM

## 2025-02-27 DIAGNOSIS — F41.9 ANXIETY: ICD-10-CM

## 2025-02-27 DIAGNOSIS — E66.3 OVERWEIGHT: ICD-10-CM

## 2025-02-27 DIAGNOSIS — I10 PRIMARY HYPERTENSION: ICD-10-CM

## 2025-02-27 PROCEDURE — G2211 COMPLEX E/M VISIT ADD ON: HCPCS | Performed by: INTERNAL MEDICINE

## 2025-02-27 PROCEDURE — 99214 OFFICE O/P EST MOD 30 MIN: CPT | Performed by: INTERNAL MEDICINE

## 2025-02-27 PROCEDURE — G0439 PPPS, SUBSEQ VISIT: HCPCS | Performed by: INTERNAL MEDICINE

## 2025-02-27 NOTE — ASSESSMENT & PLAN NOTE
History of hyperlipidemia.  Patient admits she is not always perfect as far as diet is concerned.  She remains on medication atorvastatin 10 mg daily.  We discussed again the importance of watching her intake of fats and cholesterol with her diet.  Check a lipid profile with recent testing.  Called patient if we need to make any adjustments with her medication    Orders:    Lipid panel; Future

## 2025-02-27 NOTE — ASSESSMENT & PLAN NOTE
History of osteoporosis.  Will continue to monitor her DEXA scans.  Patient comes every 6 months.  For Prolia injections

## 2025-02-27 NOTE — ASSESSMENT & PLAN NOTE
Patient has a longstanding history of anxiety disorder.  She remains on citalopram at 40 mg daily.  Also if she has increased anxiety will occasionally take a lorazepam.  States that emotionally she is doing relatively well but admits that she was somewhat sad having to put her dog to sleep recently.  She was told to please call if any new emotional problems or concerns.

## 2025-02-27 NOTE — ASSESSMENT & PLAN NOTE
Patient is a 89-year-old female history of medical problems outlined previously who is here today for repeat Medicare wellness visit.  She did not have labs performed prior to the visit today but will have these performed in the near future.  Patient states in general doing well and has completely recovered from her upper respiratory tract infection and hospitalization late last year.  She continues to travel from her daughter's house outside of [place] up to this area especially to go to [facility] on Sundays.  States that she is overall feeling back to normal and no new complaints.  She is compliant with her medication as noted previously.  Patient did undergo exam today in the office with no acute problems.  Patient will continue present medication and surveillance and we will await the results of lab testing and modify treatment and/or evaluation in the future depending on the results.  Patient knows to call if any new problems or concerns    Orders:    Comprehensive metabolic panel; Future    CBC and differential; Future    Lipid panel; Future    Urinalysis with microscopic; Future

## 2025-02-27 NOTE — ASSESSMENT & PLAN NOTE
With patient BMI she is considered overweight.  She had lost some weight with her illness back in November but now has gained back her weight approximately 8 pounds.  Again we talked the patient about the importance of diet and watching her caloric intake and staying healthy.

## 2025-02-27 NOTE — ASSESSMENT & PLAN NOTE
Chronic edema to lower extremities, +2 edema to the feet and ankles bilaterally which is chronic.  I again discussed with the patient wearing compressive stockings.  She is wearing support hose today.  She states most of the swelling completely resolves at night and begins again in the morning.

## 2025-02-27 NOTE — PROGRESS NOTES
Name: Ollie Tellez      : 1935      MRN: [id number]  Encounter Provider: Eusebio Potts DO  Encounter Date: 2025   Encounter department: [facility]    Assessment & Plan       Preventive health issues were discussed with patient, and age appropriate screening tests were ordered as noted in patient's After Visit Summary. Personalized health advice and appropriate referrals for health education or preventive services given if needed, as noted in patient's After Visit Summary.    History of Present Illness     HPI   Patient Care Team:  Eusebio Potts DO as PCP - General    Review of Systems  Medical History Reviewed by provider this encounter:       Annual Wellness Visit Questionnaire   Ollie is here for her Subsequent Wellness visit.     Health Risk Assessment:   Patient rates overall health as good. Patient feels that their physical health rating is same. Patient is satisfied with their life. Eyesight was rated as same. Hearing was rated as same. Patient feels that their emotional and mental health rating is same. Patients states they are never, rarely angry. Patient states they are sometimes unusually tired/fatigued. Pain experienced in the last 7 days has been some. Patient's pain rating has been 5/10. Patient states that she has experienced weight loss or gain in last 6 months.     Depression Screening:   PHQ-2 Score: 1  PHQ-9 Score: 2      Fall Risk Screening:   In the past year, patient has experienced: no history of falling in past year      Urinary Incontinence Screening:   Patient has not leaked urine accidently in the last six months.     Home Safety:  Patient does not have trouble with stairs inside or outside of their home. Patient has working smoke alarms and has working carbon monoxide detector. Home safety hazards include: none.     Nutrition:   Current diet is Regular.     Medications:   Patient is not currently taking any over-the-counter supplements.  Patient is able to manage medications.     Activities of Daily Living (ADLs)/Instrumental Activities of Daily Living (IADLs):   Walk and transfer into and out of bed and chair?: Yes  Dress and groom yourself?: Yes    Bathe or shower yourself?: Yes    Feed yourself? Yes  Do your laundry/housekeeping?: Yes  Manage your money, pay your bills and track your expenses?: Yes  Make your own meals?: Yes    Do your own shopping?: Yes    Previous Hospitalizations:   Any hospitalizations or ED visits within the last 12 months?: Yes    How many hospitalizations have you had in the last year?: 1-2    Advance Care Planning:   Living will: Yes    Durable POA for healthcare: Yes    Advanced directive: Yes      PREVENTIVE SCREENINGS      Cardiovascular Screening:    General: Screening Not Indicated and History Lipid Disorder      Diabetes Screening:     General: Screening Current      Colorectal Cancer Screening:     General: Screening Not Indicated      Cervical Cancer Screening:    General: Screening Not Indicated      Osteoporosis Screening:    General: Screening Not Indicated and History Osteoporosis      Lung Cancer Screening:     General: Screening Not Indicated    Screening, Brief Intervention, and Referral to Treatment (SBIRT)     Screening  Typical number of drinks in a day: 0  Typical number of drinks in a week: 0  Interpretation: Low risk drinking behavior.    Single Item Drug Screening:  How often have you used an illegal drug (including marijuana) or a prescription medication for non-medical reasons in the past year? never    Single Item Drug Screen Score: 0  Interpretation: Negative screen for possible drug use disorder    Social Drivers of Health     Financial Resource Strain: Low Risk  (9/9/2022)    Overall Financial Resource Strain (CARDIA)     Difficulty of Paying Living Expenses: Not very hard   Transportation Needs: No Transportation Needs (9/9/2022)    PRAPARE - Transportation     Lack of Transportation  "(Medical): No     Lack of Transportation (Non-Medical): No     No results found.    Objective   Ht 5' 6\" (1.676 m)   LMP  (LMP Unknown)   BMI 25.50 kg/m²     Physical Exam    "

## 2025-02-27 NOTE — ASSESSMENT & PLAN NOTE
Patient has a history of hypertension.  As noted blood pressure is showing continued control with present treatment.  Patient will continue present medication and surveillance and check on her renal function.  Again with her chronic edema lower extremities we feel would be appropriate to try to change her to another agent that does not promote edema.  Possible stopping the nifedipine    Orders:    Comprehensive metabolic panel; Future    CBC and differential; Future    Lipid panel; Future    Urinalysis with microscopic; Future

## 2025-02-27 NOTE — PROGRESS NOTES
Name: Ollie Tellez      : 1935      MRN: [id number]  Encounter Provider: Eusebio Potts DO  Encounter Date: 2025   Encounter department: [facility] INTERNAL MEDICINE    Assessment & Plan  Primary hypertension  Patient has a history of hypertension.  As noted blood pressure is showing continued control with present treatment.  Patient will continue present medication and surveillance and check on her renal function.  Again with her chronic edema lower extremities we feel would be appropriate to try to change her to another agent that does not promote edema.  Possible stopping the nifedipine    Orders:  •  Comprehensive metabolic panel; Future  •  CBC and differential; Future  •  Lipid panel; Future  •  Urinalysis with microscopic; Future    Familial hypercholesterolemia  History of hyperlipidemia.  Patient admits she is not always perfect as far as diet is concerned.  She remains on medication atorvastatin 10 mg daily.  We discussed again the importance of watching her intake of fats and cholesterol with her diet.  Check a lipid profile with recent testing.  Called patient if we need to make any adjustments with her medication    Orders:  •  Lipid panel; Future    Healthcare maintenance  Patient is a 89-year-old female history of medical problems outlined previously who is here today for repeat Medicare wellness visit.  She did not have labs performed prior to the visit today but will have these performed in the near future.  Patient states in general doing well and has completely recovered from her upper respiratory tract infection and hospitalization late last year.  She continues to travel from her daughter's house outside of [place] up to this area especially to go to [employer] on Sundays.  States that she is overall feeling back to normal and no new complaints.  She is compliant with her medication as noted previously.  Patient did undergo exam today in the office with no acute  problems.  Patient will continue present medication and surveillance and we will await the results of lab testing and modify treatment and/or evaluation in the future depending on the results.  Patient knows to call if any new problems or concerns    Orders:  •  Comprehensive metabolic panel; Future  •  CBC and differential; Future  •  Lipid panel; Future  •  Urinalysis with microscopic; Future    Anxiety  Patient has a longstanding history of anxiety disorder.  She remains on citalopram at 40 mg daily.  Also if she has increased anxiety will occasionally take a lorazepam.  States that emotionally she is doing relatively well but admits that she was somewhat sad having to put her dog to sleep recently.  She was told to please call if any new emotional problems or concerns.         Age-related osteoporosis without current pathological fracture  History of osteoporosis.  Will continue to monitor her DEXA scans.  Patient comes every 6 months.  For Prolia injections         Overweight  With patient BMI she is considered overweight.  She had lost some weight with her illness back in November but now has gained back her weight approximately 8 pounds.  Again we talked the patient about the importance of diet and watching her caloric intake and staying healthy.         Lower extremity edema  Chronic edema to lower extremities, +2 edema to the feet and ankles bilaterally which is chronic.  I again discussed with the patient wearing compressive stockings.  She is wearing support hose today.  She states most of the swelling completely resolves at night and begins again in the morning.              History of Present Illness     Patient is 89-year-old female history of medical problems outlined previously who is here today for repeat Medicare wellness visit.  Patient had no labs performed prior to the visit today but will have these performed in the near future.  Patient states in general doing well and has completely recovered  from her illness back in November of last year.  Patient continues to stay physically active and drives up here from outside of [place] every Sunday to go to [employer].  Review of Systems   Constitutional: Negative.    HENT: Negative.     Eyes: Negative.    Respiratory: Negative.     Cardiovascular: Negative.         Chronic edema bilateral lower extremities without change   Gastrointestinal: Negative.    Endocrine: Negative.    Genitourinary: Negative.    Musculoskeletal:         Some generalized arthritis but nothing acute   Skin: Negative.    Allergic/Immunologic: Negative.    Neurological: Negative.    Hematological: Negative.    Psychiatric/Behavioral: Negative.     Past Medical History:   Diagnosis Date   • Anxiety    • GERD (gastroesophageal reflux disease)    • Hyperlipidemia    • Hypertension    • Osteoarthritis      Past Surgical History:   Procedure Laterality Date   • CATARACT EXTRACTION       Family History   Problem Relation Age of Onset   • Heart failure Mother    • Heart disease Mother    • Hypertension Father    • Hypertension Brother      Social History     Tobacco Use   • Smoking status: Former   • Smokeless tobacco: Never   Vaping Use   • Vaping status: Never Used   Substance and Sexual Activity   • Alcohol use: No   • Drug use: No   • Sexual activity: Not on file     Current Outpatient Medications on File Prior to Visit   Medication Sig   • aspirin 81 mg chewable tablet Chew Twice daily   • atorvastatin (LIPITOR) 10 mg tablet TAKE 1 TABLET DAILY   • CALCIUM CARBONATE-VITAMIN D PO Take 1 tablet by mouth daily   • citalopram (CeleXA) 40 mg tablet TAKE 1 TABLET DAILY   • furosemide (LASIX) 20 mg tablet Take 1 tablet (20 mg total) by mouth daily Takes the medication daily as needed for lower extremity edema   • gabapentin (Neurontin) 100 mg capsule Take 1 capsule (100 mg total) by mouth 3 (three) times a day   • LORazepam (ATIVAN) 0.5 mg tablet Take 1 tablet (0.5 mg total) by mouth every 8 (eight)  "hours as needed for anxiety   • losartan (COZAAR) 100 MG tablet TAKE 1 TABLET DAILY   • NIFEdipine ER (ADALAT CC) 30 MG 24 hr tablet TAKE 1 TABLET DAILY   • potassium chloride (Klor-Con M10) 10 mEq tablet TAKE 1 TABLET DAILY     Allergies   Allergen Reactions   • Ampicillin      Other reaction(s): Unknown Allergic Reaction  Reaction Date: 01Nov2011;    • Sulfa Antibiotics      Other reaction(s): Unknown Allergic Reaction   • Sulfamethoxazole-Trimethoprim      Reaction Date: 01Nov2011;      Immunization History   Administered Date(s) Administered   • COVID-19 PFIZER VACCINE 0.3 ML IM 02/10/2021, 03/03/2021, 11/29/2021   • COVID-19 Pfizer vac (Kang-sucrose, gray cap) 12 yr+ IM 06/08/2022   • DTaP 5 05/14/2014   • INFLUENZA 11/08/2015   • Influenza Quadrivalent, 6-35 Months IM 09/28/2017   • Influenza Split High Dose Preservative Free IM 09/23/2013, 09/11/2014, 09/08/2016   • Influenza, high dose seasonal 0.7 mL 10/01/2018, 10/24/2019, 10/22/2020, 11/09/2021, 10/09/2023   • Influenza, seasonal, injectable 10/01/2010   • Pneumococcal Conjugate 13-Valent 12/03/2014   • Pneumococcal Polysaccharide PPV23 02/04/2019     Objective   /64   Pulse 94   Temp 99.1 °F (37.3 °C)   Resp 18   Ht 5' 6\" (1.676 m)   Wt 75.3 kg (166 lb)   LMP  (LMP Unknown)   SpO2 95%   BMI 26.79 kg/m²     Physical Exam  Vitals and nursing note reviewed.   Constitutional:       General: She is not in acute distress.     Appearance: Normal appearance. She is not ill-appearing, toxic-appearing or diaphoretic.      Comments: Pleasant cheerful 89-year-old female who is awake alert in no acute distress oriented x 3, overweight   HENT:      Head: Normocephalic and atraumatic.      Right Ear: Tympanic membrane, ear canal and external ear normal. There is no impacted cerumen.      Left Ear: Tympanic membrane, ear canal and external ear normal. There is no impacted cerumen.      Nose: Nose normal. No congestion or rhinorrhea.      Mouth/Throat:      " Mouth: Mucous membranes are moist.      Pharynx: Oropharynx is clear. No oropharyngeal exudate or posterior oropharyngeal erythema.   Eyes:      General: No scleral icterus.        Right eye: No discharge.         Left eye: No discharge.      Extraocular Movements: Extraocular movements intact.      Conjunctiva/sclera: Conjunctivae normal.      Pupils: Pupils are equal, round, and reactive to light.   Neck:      Vascular: No carotid bruit.   Cardiovascular:      Rate and Rhythm: Normal rate and regular rhythm.      Heart sounds: No murmur heard.     No friction rub. No gallop.   Pulmonary:      Effort: Pulmonary effort is normal. No respiratory distress.      Breath sounds: Normal breath sounds. No stridor. No wheezing, rhonchi or rales.   Chest:      Chest wall: No tenderness.   Abdominal:      General: Bowel sounds are normal. There is no distension.      Palpations: Abdomen is soft. There is no mass.      Tenderness: There is no abdominal tenderness. There is no right CVA tenderness, left CVA tenderness, guarding or rebound.      Hernia: No hernia is present.   Musculoskeletal:         General: No swelling, tenderness, deformity or signs of injury.      Cervical back: Normal range of motion and neck supple. No rigidity or tenderness.      Right lower leg: Edema present.      Left lower leg: Edema present.      Comments: Patient on examination +2 edema bilateral lower extremities without change   Lymphadenopathy:      Cervical: No cervical adenopathy.   Skin:     General: Skin is warm and dry.      Coloration: Skin is not jaundiced or pale.      Findings: No bruising, erythema, lesion or rash.   Neurological:      General: No focal deficit present.      Mental Status: She is alert and oriented to person, place, and time. Mental status is at baseline.      Cranial Nerves: No cranial nerve deficit.      Sensory: No sensory deficit.      Motor: No weakness.      Coordination: Coordination normal.      Gait: Gait  normal.      Deep Tendon Reflexes: Reflexes normal.   Psychiatric:         Mood and Affect: Mood normal.         Behavior: Behavior normal.         Thought Content: Thought content normal.         Judgment: Judgment normal.

## 2025-03-01 ENCOUNTER — APPOINTMENT (OUTPATIENT)
Dept: LAB | Facility: HOSPITAL | Age: OVER 89
End: 2025-03-01
Payer: MEDICARE

## 2025-03-01 DIAGNOSIS — E78.01 FAMILIAL HYPERCHOLESTEROLEMIA: ICD-10-CM

## 2025-03-01 DIAGNOSIS — I10 PRIMARY HYPERTENSION: ICD-10-CM

## 2025-03-01 DIAGNOSIS — Z00.00 HEALTHCARE MAINTENANCE: ICD-10-CM

## 2025-03-01 LAB
ALBUMIN SERPL BCG-MCNC: 4.3 G/DL (ref 3.5–5)
ALP SERPL-CCNC: 44 U/L (ref 34–104)
ALT SERPL W P-5'-P-CCNC: 7 U/L (ref 7–52)
ANION GAP SERPL CALCULATED.3IONS-SCNC: 8 MMOL/L (ref 4–13)
AST SERPL W P-5'-P-CCNC: 17 U/L (ref 13–39)
BACTERIA UR QL AUTO: ABNORMAL /HPF
BASOPHILS # BLD AUTO: 0.02 THOUSANDS/ÂΜL (ref 0–0.1)
BASOPHILS NFR BLD AUTO: 0 % (ref 0–1)
BILIRUB SERPL-MCNC: 0.97 MG/DL (ref 0.2–1)
BILIRUB UR QL STRIP: NEGATIVE
BUN SERPL-MCNC: 15 MG/DL (ref 5–25)
CALCIUM SERPL-MCNC: 9.1 MG/DL (ref 8.4–10.2)
CHLORIDE SERPL-SCNC: 97 MMOL/L (ref 96–108)
CHOLEST SERPL-MCNC: 167 MG/DL (ref ?–200)
CLARITY UR: ABNORMAL
CO2 SERPL-SCNC: 28 MMOL/L (ref 21–32)
COLOR UR: YELLOW
CREAT SERPL-MCNC: 0.8 MG/DL (ref 0.6–1.3)
EOSINOPHIL # BLD AUTO: 0.05 THOUSAND/ÂΜL (ref 0–0.61)
EOSINOPHIL NFR BLD AUTO: 1 % (ref 0–6)
ERYTHROCYTE [DISTWIDTH] IN BLOOD BY AUTOMATED COUNT: 11.6 % (ref 11.6–15.1)
GFR SERPL CREATININE-BSD FRML MDRD: 65 ML/MIN/1.73SQ M
GLUCOSE P FAST SERPL-MCNC: 95 MG/DL (ref 65–99)
GLUCOSE UR STRIP-MCNC: NEGATIVE MG/DL
HCT VFR BLD AUTO: 36.1 % (ref 34.8–46.1)
HDLC SERPL-MCNC: 82 MG/DL
HGB BLD-MCNC: 11.9 G/DL (ref 11.5–15.4)
HGB UR QL STRIP.AUTO: ABNORMAL
IMM GRANULOCYTES # BLD AUTO: 0.03 THOUSAND/UL (ref 0–0.2)
IMM GRANULOCYTES NFR BLD AUTO: 0 % (ref 0–2)
KETONES UR STRIP-MCNC: NEGATIVE MG/DL
LDLC SERPL CALC-MCNC: 74 MG/DL (ref 0–100)
LEUKOCYTE ESTERASE UR QL STRIP: ABNORMAL
LYMPHOCYTES # BLD AUTO: 1.82 THOUSANDS/ÂΜL (ref 0.6–4.47)
LYMPHOCYTES NFR BLD AUTO: 25 % (ref 14–44)
MCH RBC QN AUTO: 32.9 PG (ref 26.8–34.3)
MCHC RBC AUTO-ENTMCNC: 33 G/DL (ref 31.4–37.4)
MCV RBC AUTO: 100 FL (ref 82–98)
MONOCYTES # BLD AUTO: 0.54 THOUSAND/ÂΜL (ref 0.17–1.22)
MONOCYTES NFR BLD AUTO: 7 % (ref 4–12)
NEUTROPHILS # BLD AUTO: 4.89 THOUSANDS/ÂΜL (ref 1.85–7.62)
NEUTS SEG NFR BLD AUTO: 67 % (ref 43–75)
NITRITE UR QL STRIP: NEGATIVE
NON-SQ EPI CELLS URNS QL MICRO: ABNORMAL /HPF
NONHDLC SERPL-MCNC: 85 MG/DL
NRBC BLD AUTO-RTO: 0 /100 WBCS
PH UR STRIP.AUTO: 6 [PH]
PLATELET # BLD AUTO: 151 THOUSANDS/UL (ref 149–390)
PMV BLD AUTO: 9.2 FL (ref 8.9–12.7)
POTASSIUM SERPL-SCNC: 3.7 MMOL/L (ref 3.5–5.3)
PROT SERPL-MCNC: 6.9 G/DL (ref 6.4–8.4)
PROT UR STRIP-MCNC: NEGATIVE MG/DL
RBC # BLD AUTO: 3.62 MILLION/UL (ref 3.81–5.12)
RBC #/AREA URNS AUTO: ABNORMAL /HPF
SODIUM SERPL-SCNC: 133 MMOL/L (ref 135–147)
SP GR UR STRIP.AUTO: 1.02 (ref 1–1.03)
TRIGL SERPL-MCNC: 55 MG/DL (ref ?–150)
UROBILINOGEN UR STRIP-ACNC: <2 MG/DL
WBC # BLD AUTO: 7.35 THOUSAND/UL (ref 4.31–10.16)
WBC #/AREA URNS AUTO: ABNORMAL /HPF

## 2025-03-01 PROCEDURE — 85025 COMPLETE CBC W/AUTO DIFF WBC: CPT

## 2025-03-01 PROCEDURE — 80053 COMPREHEN METABOLIC PANEL: CPT

## 2025-03-01 PROCEDURE — 36415 COLL VENOUS BLD VENIPUNCTURE: CPT

## 2025-03-01 PROCEDURE — 80061 LIPID PANEL: CPT

## 2025-03-01 PROCEDURE — 81001 URINALYSIS AUTO W/SCOPE: CPT

## 2025-03-29 PROBLEM — Z00.00 HEALTHCARE MAINTENANCE: Status: RESOLVED | Noted: 2019-10-24 | Resolved: 2025-03-29

## 2025-04-02 ENCOUNTER — CLINICAL SUPPORT (OUTPATIENT)
Age: OVER 89
End: 2025-04-02
Payer: MEDICARE

## 2025-04-02 DIAGNOSIS — M79.604 ANTERIOR LEG PAIN, RIGHT: ICD-10-CM

## 2025-04-02 DIAGNOSIS — M54.2 CERVICAL MUSCLE PAIN: ICD-10-CM

## 2025-04-02 DIAGNOSIS — F41.9 ANXIETY: ICD-10-CM

## 2025-04-02 DIAGNOSIS — R60.0 LOWER EXTREMITY EDEMA: ICD-10-CM

## 2025-04-02 RX ORDER — GABAPENTIN 100 MG/1
100 CAPSULE ORAL 3 TIMES DAILY
Qty: 270 CAPSULE | Refills: 3 | Status: CANCELLED | OUTPATIENT
Start: 2025-04-02

## 2025-04-02 RX ORDER — GABAPENTIN 100 MG/1
100 CAPSULE ORAL 3 TIMES DAILY
Qty: 270 CAPSULE | Refills: 2 | Status: SHIPPED | OUTPATIENT
Start: 2025-04-02

## 2025-04-02 RX ORDER — LORAZEPAM 0.5 MG/1
0.5 TABLET ORAL EVERY 8 HOURS PRN
Qty: 90 TABLET | Refills: 0 | Status: SHIPPED | OUTPATIENT
Start: 2025-04-02

## 2025-04-02 RX ORDER — FUROSEMIDE 20 MG/1
20 TABLET ORAL DAILY
Qty: 90 TABLET | Refills: 2 | Status: SHIPPED | OUTPATIENT
Start: 2025-04-02

## 2025-04-02 RX ORDER — FUROSEMIDE 20 MG/1
20 TABLET ORAL DAILY
Qty: 90 TABLET | Refills: 3 | Status: CANCELLED | OUTPATIENT
Start: 2025-04-02

## 2025-04-02 RX ORDER — LORAZEPAM 0.5 MG/1
0.5 TABLET ORAL EVERY 8 HOURS PRN
Qty: 90 TABLET | Refills: 0 | Status: CANCELLED | OUTPATIENT
Start: 2025-04-02

## 2025-04-14 DIAGNOSIS — I10 PRIMARY HYPERTENSION: ICD-10-CM

## 2025-04-15 RX ORDER — NIFEDIPINE 30 MG
30 TABLET, EXTENDED RELEASE ORAL DAILY
Qty: 90 TABLET | Refills: 1 | Status: SHIPPED | OUTPATIENT
Start: 2025-04-15

## 2025-04-15 RX ORDER — LOSARTAN POTASSIUM 100 MG/1
100 TABLET ORAL DAILY
Qty: 90 TABLET | Refills: 1 | Status: SHIPPED | OUTPATIENT
Start: 2025-04-15

## 2025-06-09 DIAGNOSIS — I10 ESSENTIAL HYPERTENSION: ICD-10-CM

## 2025-06-09 DIAGNOSIS — F41.9 ANXIETY: ICD-10-CM

## 2025-06-09 RX ORDER — CITALOPRAM HYDROBROMIDE 40 MG/1
40 TABLET ORAL DAILY
Qty: 90 TABLET | Refills: 1 | Status: SHIPPED | OUTPATIENT
Start: 2025-06-09

## 2025-06-09 RX ORDER — POTASSIUM CHLORIDE 750 MG/1
10 TABLET, EXTENDED RELEASE ORAL DAILY
Qty: 90 TABLET | Refills: 1 | Status: SHIPPED | OUTPATIENT
Start: 2025-06-09

## 2025-06-18 ENCOUNTER — OFFICE VISIT (OUTPATIENT)
Age: OVER 89
End: 2025-06-18
Payer: MEDICARE

## 2025-06-18 VITALS
DIASTOLIC BLOOD PRESSURE: 78 MMHG | RESPIRATION RATE: 16 BRPM | WEIGHT: 167 LBS | SYSTOLIC BLOOD PRESSURE: 130 MMHG | OXYGEN SATURATION: 95 % | HEIGHT: 66 IN | TEMPERATURE: 98.5 F | BODY MASS INDEX: 26.84 KG/M2 | HEART RATE: 82 BPM

## 2025-06-18 DIAGNOSIS — I10 PRIMARY HYPERTENSION: Primary | ICD-10-CM

## 2025-06-18 DIAGNOSIS — E78.01 FAMILIAL HYPERCHOLESTEROLEMIA: ICD-10-CM

## 2025-06-18 DIAGNOSIS — R60.0 LOWER EXTREMITY EDEMA: ICD-10-CM

## 2025-06-18 DIAGNOSIS — M81.0 AGE-RELATED OSTEOPOROSIS WITHOUT CURRENT PATHOLOGICAL FRACTURE: ICD-10-CM

## 2025-06-18 DIAGNOSIS — F41.9 ANXIETY: ICD-10-CM

## 2025-06-18 DIAGNOSIS — R39.11 URINARY HESITANCY: ICD-10-CM

## 2025-06-18 DIAGNOSIS — E66.3 OVERWEIGHT: ICD-10-CM

## 2025-06-18 PROCEDURE — G2211 COMPLEX E/M VISIT ADD ON: HCPCS | Performed by: INTERNAL MEDICINE

## 2025-06-18 PROCEDURE — 99214 OFFICE O/P EST MOD 30 MIN: CPT | Performed by: INTERNAL MEDICINE

## 2025-06-18 NOTE — ASSESSMENT & PLAN NOTE
Along with her other medical problems she also has a longstanding history of anxiety disorder.  Patient remains on medication Celexa 40 mg daily but also intermittently if she has a very stressful day we will take an Ativan but not on a daily basis.  Patient was told if any new emotional problems or concerns to please call.

## 2025-06-18 NOTE — ASSESSMENT & PLAN NOTE
History of osteopenia, osteoporosis.  Patient continue Prolia injections and this is scheduled for October of this year.  We will continue to monitor her bone density..  Has had no adverse effects from Prolia injections.

## 2025-06-18 NOTE — ASSESSMENT & PLAN NOTE
History of hyperlipidemia.  Patient remains on atorvastatin 10 mg daily and her cholesterol has been controlled.  Despite this I did discuss with the patient the importance of diet and watching her intake of fats and cholesterol with her diet.  Will make adjustment medication in the future if indicated.

## 2025-06-18 NOTE — ASSESSMENT & PLAN NOTE
With the patient's BMI she is overweight.  Patient has continued to struggle with her weight and has been successful with some weight loss over the years.  She is commended on her endeavors and was encouraged to continue to look at her diet and ways that she can continue to lose weight.

## 2025-06-18 NOTE — PROGRESS NOTES
Name: Ollie Tellez      : 1935      MRN: [id number]  Encounter Provider: Eusebio Potts DO  Encounter Date: 2025   Encounter department: [facility] INTERNAL MEDICINE    Assessment & Plan  Primary hypertension  Patient does have a longstanding history of hypertension.  She remains on medication as previously including combination of valsartan nifedipine and also taking furosemide on a daily basis because of chronic edema to lower extremities.  Blood pressure today in the office is stable.  She will continue with present medication and surveillance and we will check on her renal function prior to her next visit.  Make adjustments with medication in the future if needed.    Orders:  •  Basic metabolic panel; Future  •  Lipid panel; Future  •  Urinalysis with microscopic; Future    Age-related osteoporosis without current pathological fracture  History of osteopenia, osteoporosis.  Patient continue Prolia injections and this is scheduled for October of this year.  We will continue to monitor her bone density..  Has had no adverse effects from Prolia injections.         Anxiety  Along with her other medical problems she also has a longstanding history of anxiety disorder.  Patient remains on medication Celexa 40 mg daily but also intermittently if she has a very stressful day we will take an Ativan but not on a daily basis.  Patient was told if any new emotional problems or concerns to please call.         Lower extremity edema  Chronic edema bilateral lower extremities, venous insufficiency.  Patient continues to wear support stockings and we have placed the patient on Lasix 20 mg daily.  Patient has a +1 edema on evaluation today in the office.         Urinary hesitancy  Check a UA with her next visit    Orders:  •  Urinalysis with microscopic; Future    Familial hypercholesterolemia  History of hyperlipidemia.  Patient remains on atorvastatin 10 mg daily and her cholesterol has been  controlled.  Despite this I did discuss with the patient the importance of diet and watching her intake of fats and cholesterol with her diet.  Will make adjustment medication in the future if indicated.         Overweight  With the patient's BMI she is overweight.  Patient has continued to struggle with her weight and has been successful with some weight loss over the years.  She is commended on her endeavors and was encouraged to continue to look at her diet and ways that she can continue to lose weight.              History of Present Illness     Patient is an 80-year-old female with a history of medical problems outlined previously who is here today for routine follow-up.  Patient had no labs prior to the visit but had labs early rather than for the appointment today.  Relates that she is doing relatively well with no new complaints.  She continues to try to watch her diet and she is compliant with her medication.  Review of Systems   Constitutional: Negative.    HENT: Negative.     Eyes: Negative.    Respiratory: Negative.     Cardiovascular: Negative.         Chronic bilateral edema to lower extremities without change   Gastrointestinal: Negative.    Endocrine: Negative.    Genitourinary: Negative.         Increased urination when she takes her Lasix   Musculoskeletal: Negative.         Diffuse arthritis but nothing acute   Skin: Negative.    Allergic/Immunologic: Negative.    Neurological: Negative.    Hematological: Negative.    Psychiatric/Behavioral: Negative.     Past Medical History[1]  Past Surgical History[2]  Family History[3]  Social History[4]  Medications[5]  Allergies   Allergen Reactions   • Ampicillin      Other reaction(s): Unknown Allergic Reaction  Reaction Date: 01Nov2011;    • Sulfa Antibiotics      Other reaction(s): Unknown Allergic Reaction   • Sulfamethoxazole-Trimethoprim      Reaction Date: 01Nov2011;      Immunization History   Administered Date(s) Administered   • COVID-19 PFIZER  "VACCINE 0.3 ML IM 02/10/2021, 03/03/2021, 11/29/2021   • COVID-19 Pfizer vac (Kang-sucrose, gray cap) 12 yr+ IM 06/08/2022   • DTaP 5 05/14/2014   • INFLUENZA 11/08/2015, 11/07/2022   • Influenza Quadrivalent, 6-35 Months IM 09/28/2017   • Influenza Split High Dose Preservative Free IM 09/23/2013, 09/11/2014, 09/08/2016   • Influenza, high dose seasonal 0.7 mL 10/01/2018, 10/24/2019, 10/22/2020, 11/09/2021, 10/09/2023   • Influenza, seasonal, injectable 10/01/2010   • Pneumococcal Conjugate 13-Valent 12/03/2014   • Pneumococcal Polysaccharide PPV23 02/04/2019     Objective   /78   Pulse 82   Temp 98.5 °F (36.9 °C)   Resp 16   Ht 5' 6\" (1.676 m)   Wt 75.8 kg (167 lb)   LMP  (LMP Unknown)   SpO2 95%   BMI 26.95 kg/m²     Physical Exam  Vitals and nursing note reviewed.   Constitutional:       General: She is not in acute distress.     Appearance: Normal appearance. She is not ill-appearing, toxic-appearing or diaphoretic.      Comments: Pleasant, overweight 88-year-old female who is awake alert in no acute distress oriented x 3   HENT:      Head: Normocephalic and atraumatic.      Right Ear: Tympanic membrane, ear canal and external ear normal. There is no impacted cerumen.      Left Ear: Tympanic membrane, ear canal and external ear normal. There is no impacted cerumen.      Nose: Nose normal. No congestion or rhinorrhea.      Mouth/Throat:      Mouth: Mucous membranes are moist.      Pharynx: Oropharynx is clear. No oropharyngeal exudate or posterior oropharyngeal erythema.     Eyes:      General: No scleral icterus.        Right eye: No discharge.         Left eye: No discharge.      Extraocular Movements: Extraocular movements intact.      Conjunctiva/sclera: Conjunctivae normal.      Pupils: Pupils are equal, round, and reactive to light.     Neck:      Vascular: No carotid bruit.     Cardiovascular:      Rate and Rhythm: Normal rate and regular rhythm.      Heart sounds: No murmur heard.     No " friction rub. No gallop.   Pulmonary:      Effort: Pulmonary effort is normal. No respiratory distress.      Breath sounds: Normal breath sounds. No stridor. No wheezing, rhonchi or rales.   Chest:      Chest wall: No tenderness.   Abdominal:      General: Bowel sounds are normal. There is no distension.      Palpations: Abdomen is soft. There is no mass.      Tenderness: There is no abdominal tenderness. There is no right CVA tenderness, left CVA tenderness, guarding or rebound.      Hernia: No hernia is present.     Musculoskeletal:         General: No swelling, tenderness, deformity or signs of injury.      Cervical back: Normal range of motion and neck supple. No rigidity or tenderness.      Right lower leg: Edema present.      Left lower leg: Edema present.      Comments: Patient on examination +2 edema bilateral lower extremities without change   Lymphadenopathy:      Cervical: No cervical adenopathy.     Skin:     General: Skin is warm and dry.      Coloration: Skin is not jaundiced or pale.      Findings: No bruising, erythema, lesion or rash.     Neurological:      General: No focal deficit present.      Mental Status: She is alert and oriented to person, place, and time. Mental status is at baseline.      Cranial Nerves: No cranial nerve deficit.      Sensory: No sensory deficit.      Motor: No weakness.      Coordination: Coordination normal.      Gait: Gait normal.      Deep Tendon Reflexes: Reflexes normal.     Psychiatric:         Mood and Affect: Mood normal.         Behavior: Behavior normal.         Thought Content: Thought content normal.         Judgment: Judgment normal.            [1]  Past Medical History:  Diagnosis Date   • Anxiety    • GERD (gastroesophageal reflux disease)    • Hyperlipidemia    • Hypertension    • Osteoarthritis    [2]  Past Surgical History:  Procedure Laterality Date   • CATARACT EXTRACTION     [3]  Family History  Problem Relation Name Age of Onset   • Heart failure  Mother     • Heart disease Mother     • Hypertension Father     • Hypertension Brother     [4]  Social History  Tobacco Use   • Smoking status: Former   • Smokeless tobacco: Never   Vaping Use   • Vaping status: Never Used   Substance and Sexual Activity   • Alcohol use: No   • Drug use: No   [5]  Current Outpatient Medications on File Prior to Visit   Medication Sig   • aspirin 81 mg chewable tablet Chew in the morning and in the evening.   • atorvastatin (LIPITOR) 10 mg tablet TAKE 1 TABLET DAILY   • CALCIUM CARBONATE-VITAMIN D PO Take 1 tablet by mouth in the morning.   • citalopram (CeleXA) 40 mg tablet TAKE 1 TABLET DAILY   • furosemide (LASIX) 20 mg tablet Take 1 tablet (20 mg total) by mouth daily Takes the medication daily as needed for lower extremity edema   • gabapentin (Neurontin) 100 mg capsule Take 1 capsule (100 mg total) by mouth 3 (three) times a day   • LORazepam (ATIVAN) 0.5 mg tablet Take 1 tablet (0.5 mg total) by mouth every 8 (eight) hours as needed for anxiety   • losartan (COZAAR) 100 MG tablet TAKE 1 TABLET DAILY   • NIFEdipine ER (ADALAT CC) 30 MG 24 hr tablet TAKE 1 TABLET DAILY   • potassium chloride (Klor-Con M10) 10 mEq tablet TAKE 1 TABLET DAILY

## 2025-06-18 NOTE — ASSESSMENT & PLAN NOTE
Chronic edema bilateral lower extremities, venous insufficiency.  Patient continues to wear support stockings and we have placed the patient on Lasix 20 mg daily.  Patient has a +1 edema on evaluation today in the office.

## 2025-06-18 NOTE — ASSESSMENT & PLAN NOTE
Patient does have a longstanding history of hypertension.  She remains on medication as previously including combination of valsartan nifedipine and also taking furosemide on a daily basis because of chronic edema to lower extremities.  Blood pressure today in the office is stable.  She will continue with present medication and surveillance and we will check on her renal function prior to her next visit.  Make adjustments with medication in the future if needed.    Orders:    Basic metabolic panel; Future    Lipid panel; Future    Urinalysis with microscopic; Future

## 2025-07-01 DIAGNOSIS — M79.604 ANTERIOR LEG PAIN, RIGHT: ICD-10-CM

## 2025-07-01 DIAGNOSIS — F41.9 ANXIETY: ICD-10-CM

## 2025-07-01 DIAGNOSIS — M54.2 CERVICAL MUSCLE PAIN: ICD-10-CM

## 2025-07-01 DIAGNOSIS — R60.0 LOWER EXTREMITY EDEMA: ICD-10-CM

## 2025-07-01 NOTE — TELEPHONE ENCOUNTER
Reason for call:   [x] Refill   [] Prior Auth  [x] Other: [facility] told patient she has no refills    Office:   [] PCP/Provider - Eusebio Potts, DO   [x] Specialty/Provider -     Medication:  furosemide (LASIX) 20 mg tablet    Dose/Frequency: Take 1 tablet (20 mg total) by mouth daily     Quantity: 90    Pharmacy: [facility] [address] [facility]   Does the patient have enough for 3 days?   [x] Yes   [] No - Send as HP to POD    Mail Away Pharmacy   Does the patient have enough for 10 days?   [x] Yes   [] No - Send as HP to POD    Reason for call:   [x] Refill   [] Prior Auth  [] Other:     Office:   [x] PCP/Provider - Eusebio Potts, DO   [] Specialty/Provider -     Medication: gabapentin (Neurontin) 100 mg capsule     Dose/Frequency: Take 1 capsule (100 mg total) by mouth 3 (three) times a day,     Quantity: 270    Pharmacy: [facility] [address] [facility]   Does the patient have enough for 3 days?   [x] Yes   [] No - Send as HP to POD    [facility] Pharmacy   Does the patient have enough for 10 days?   [x] Yes   [] No - Send as HP to POD    Reason for call:   [x] Refill   [] Prior Auth  [] Other:     Office:   [x] PCP/Provider -   [] Specialty/Provider -     Medication: LORazepam (ATIVAN) 0.5 mg tablet     Dose/Frequency: Take 1 tablet (0.5 mg total) by mouth every 8 (eight) hours as needed for anxiety,     Quantity: 90    Pharmacy: [facility] [address] [facility]   Does the patient have enough for 3 days?   [x] Yes   [] No - Send as HP to POD    Mail Away Pharmacy   Does the patient have enough for 10 days?   [x] Yes   [] No - Send as HP to POD

## 2025-07-02 RX ORDER — GABAPENTIN 100 MG/1
100 CAPSULE ORAL 3 TIMES DAILY
Qty: 270 CAPSULE | Refills: 1 | Status: SHIPPED | OUTPATIENT
Start: 2025-07-02

## 2025-07-02 RX ORDER — FUROSEMIDE 20 MG/1
20 TABLET ORAL DAILY
Qty: 90 TABLET | Refills: 1 | Status: SHIPPED | OUTPATIENT
Start: 2025-07-02

## 2025-07-02 RX ORDER — LORAZEPAM 0.5 MG/1
0.5 TABLET ORAL EVERY 8 HOURS PRN
Qty: 90 TABLET | Refills: 0 | Status: SHIPPED | OUTPATIENT
Start: 2025-07-02